# Patient Record
Sex: FEMALE | Race: WHITE | NOT HISPANIC OR LATINO | Employment: FULL TIME | URBAN - METROPOLITAN AREA
[De-identification: names, ages, dates, MRNs, and addresses within clinical notes are randomized per-mention and may not be internally consistent; named-entity substitution may affect disease eponyms.]

---

## 2017-01-31 ENCOUNTER — GENERIC CONVERSION - ENCOUNTER (OUTPATIENT)
Dept: OTHER | Facility: OTHER | Age: 62
End: 2017-01-31

## 2017-01-31 ENCOUNTER — HOSPITAL ENCOUNTER (OUTPATIENT)
Dept: RADIOLOGY | Facility: MEDICAL CENTER | Age: 62
Discharge: HOME | End: 2017-01-31
Payer: COMMERCIAL

## 2017-01-31 ENCOUNTER — ALLSCRIPTS OFFICE VISIT (OUTPATIENT)
Dept: OTHER | Facility: OTHER | Age: 62
End: 2017-01-31

## 2017-01-31 ENCOUNTER — TRANSCRIBE ORDERS (OUTPATIENT)
Dept: ADMINISTRATIVE | Facility: HOSPITAL | Age: 62
End: 2017-01-31

## 2017-01-31 DIAGNOSIS — M25.562 PAIN IN LEFT KNEE: ICD-10-CM

## 2017-01-31 PROCEDURE — 73564 X-RAY EXAM KNEE 4 OR MORE: CPT

## 2017-02-03 ENCOUNTER — TRANSCRIBE ORDERS (OUTPATIENT)
Dept: ADMINISTRATIVE | Facility: HOSPITAL | Age: 62
End: 2017-02-03

## 2017-02-03 ENCOUNTER — GENERIC CONVERSION - ENCOUNTER (OUTPATIENT)
Dept: OTHER | Facility: OTHER | Age: 62
End: 2017-02-03

## 2017-02-03 DIAGNOSIS — M25.562 LEFT KNEE PAIN, UNSPECIFIED CHRONICITY: Primary | ICD-10-CM

## 2017-02-14 ENCOUNTER — HOSPITAL ENCOUNTER (OUTPATIENT)
Dept: RADIOLOGY | Age: 62
Discharge: HOME | End: 2017-02-14
Payer: COMMERCIAL

## 2017-02-14 DIAGNOSIS — M25.562 LEFT KNEE PAIN, UNSPECIFIED CHRONICITY: ICD-10-CM

## 2017-02-14 PROCEDURE — 73721 MRI JNT OF LWR EXTRE W/O DYE: CPT

## 2017-02-20 ENCOUNTER — ALLSCRIPTS OFFICE VISIT (OUTPATIENT)
Dept: OTHER | Facility: OTHER | Age: 62
End: 2017-02-20

## 2017-03-01 ENCOUNTER — ALLSCRIPTS OFFICE VISIT (OUTPATIENT)
Dept: OTHER | Facility: OTHER | Age: 62
End: 2017-03-01

## 2017-03-23 ENCOUNTER — GENERIC CONVERSION - ENCOUNTER (OUTPATIENT)
Dept: OTHER | Facility: OTHER | Age: 62
End: 2017-03-23

## 2017-04-13 ENCOUNTER — GENERIC CONVERSION - ENCOUNTER (OUTPATIENT)
Dept: OTHER | Facility: OTHER | Age: 62
End: 2017-04-13

## 2017-04-20 ENCOUNTER — GENERIC CONVERSION - ENCOUNTER (OUTPATIENT)
Dept: OTHER | Facility: OTHER | Age: 62
End: 2017-04-20

## 2017-04-27 ENCOUNTER — GENERIC CONVERSION - ENCOUNTER (OUTPATIENT)
Dept: OTHER | Facility: OTHER | Age: 62
End: 2017-04-27

## 2017-06-16 ENCOUNTER — ALLSCRIPTS OFFICE VISIT (OUTPATIENT)
Dept: OTHER | Facility: OTHER | Age: 62
End: 2017-06-16

## 2017-06-20 DIAGNOSIS — M54.42 LOW BACK PAIN WITH LEFT-SIDED SCIATICA: ICD-10-CM

## 2017-06-23 ENCOUNTER — TRANSCRIBE ORDERS (OUTPATIENT)
Dept: ADMINISTRATIVE | Age: 62
End: 2017-06-23

## 2017-06-23 ENCOUNTER — HOSPITAL ENCOUNTER (OUTPATIENT)
Dept: RADIOLOGY | Age: 62
Discharge: HOME | End: 2017-06-23
Payer: COMMERCIAL

## 2017-06-23 DIAGNOSIS — M54.42 LOW BACK PAIN WITH LEFT-SIDED SCIATICA: ICD-10-CM

## 2017-06-23 PROCEDURE — 72110 X-RAY EXAM L-2 SPINE 4/>VWS: CPT

## 2017-06-23 PROCEDURE — 72148 MRI LUMBAR SPINE W/O DYE: CPT

## 2017-06-26 ENCOUNTER — GENERIC CONVERSION - ENCOUNTER (OUTPATIENT)
Dept: OTHER | Facility: OTHER | Age: 62
End: 2017-06-26

## 2017-08-16 ENCOUNTER — GENERIC CONVERSION - ENCOUNTER (OUTPATIENT)
Dept: OTHER | Facility: OTHER | Age: 62
End: 2017-08-16

## 2017-09-01 ENCOUNTER — GENERIC CONVERSION - ENCOUNTER (OUTPATIENT)
Dept: OTHER | Facility: OTHER | Age: 62
End: 2017-09-01

## 2017-09-18 ENCOUNTER — GENERIC CONVERSION - ENCOUNTER (OUTPATIENT)
Dept: OTHER | Facility: OTHER | Age: 62
End: 2017-09-18

## 2017-10-13 ENCOUNTER — GENERIC CONVERSION - ENCOUNTER (OUTPATIENT)
Dept: OTHER | Facility: OTHER | Age: 62
End: 2017-10-13

## 2017-12-22 ENCOUNTER — LAB CONVERSION - ENCOUNTER (OUTPATIENT)
Dept: OTHER | Facility: OTHER | Age: 62
End: 2017-12-22

## 2017-12-22 LAB
A/G RATIO (HISTORICAL): 2.1 (CALC) (ref 1–2.5)
ALBUMIN SERPL BCP-MCNC: 4.2 G/DL (ref 3.6–5.1)
ALP SERPL-CCNC: 90 U/L (ref 33–130)
AST SERPL W P-5'-P-CCNC: 16 U/L (ref 10–35)
BILIRUB SERPL-MCNC: 0.5 MG/DL (ref 0.2–1.2)
BUN SERPL-MCNC: 26 MG/DL (ref 7–25)
BUN/CREA RATIO (HISTORICAL): 37 (CALC) (ref 6–22)
CALCIUM SERPL-MCNC: 8.9 MG/DL (ref 8.6–10.4)
CHLORIDE SERPL-SCNC: 104 MMOL/L (ref 98–110)
CHOLEST SERPL-MCNC: 132 MG/DL
CHOLEST/HDLC SERPL: 2.5 (CALC)
CO2 SERPL-SCNC: 27 MMOL/L (ref 20–31)
CREAT SERPL-MCNC: 0.7 MG/DL (ref 0.5–0.99)
EGFR AFRICAN AMERICAN (HISTORICAL): 108 ML/MIN/1.73M2
EGFR-AMERICAN CALC (HISTORICAL): 94 ML/MIN/1.73M2
GAMMA GLOBULIN (HISTORICAL): 2 G/DL (CALC) (ref 1.9–3.7)
GLUCOSE (HISTORICAL): 98 MG/DL (ref 65–99)
HDLC SERPL-MCNC: 52 MG/DL
HEPATITIS A IGM ANTIBODY (HISTORICAL): NORMAL
HEPATITIS B CORE TOTAL ANTIBODY (HISTORICAL): NORMAL
HEPATITIS B SURFACE ANTIGEN (HISTORICAL): NORMAL
HEPATITIS C ANTIBODY (HISTORICAL): NORMAL
LDL CHOLESTEROL (HISTORICAL): 62 MG/DL (CALC)
NON-HDL-CHOL (CHOL-HDL) (HISTORICAL): 80 MG/DL (CALC)
POTASSIUM SERPL-SCNC: 4.4 MMOL/L (ref 3.5–5.3)
SIGNAL TO CUT-OFF (HISTORICAL): 0.02
SODIUM SERPL-SCNC: 139 MMOL/L (ref 135–146)
TOTAL PROTEIN (HISTORICAL): 6.2 G/DL (ref 6.1–8.1)
TRIGL SERPL-MCNC: 93 MG/DL
TSH SERPL DL<=0.05 MIU/L-ACNC: 1.37 MIU/L (ref 0.4–4.5)

## 2017-12-28 ENCOUNTER — GENERIC CONVERSION - ENCOUNTER (OUTPATIENT)
Dept: OTHER | Facility: OTHER | Age: 62
End: 2017-12-28

## 2017-12-28 DIAGNOSIS — E07.9 DISORDER OF THYROID: ICD-10-CM

## 2017-12-29 ENCOUNTER — HOSPITAL ENCOUNTER (OUTPATIENT)
Dept: ULTRASOUND IMAGING | Facility: HOSPITAL | Age: 62
Discharge: HOME | End: 2017-12-29
Payer: COMMERCIAL

## 2017-12-29 DIAGNOSIS — E07.9 DISORDER OF THYROID: ICD-10-CM

## 2017-12-29 PROCEDURE — 76536 US EXAM OF HEAD AND NECK: CPT

## 2018-01-09 NOTE — PROGRESS NOTES
Chief Complaint  Chief Complaint Free Text Note Form: For nurse dressing change for RLE wound  History of Present Illness  Wound Identification HPI:   Wound Identification HPI   Wound #1: right lower leg        Visit Information:   The patient came to Wound Care and was ambulatory  The patient is being seen for follow-up with RN at the [address] [place] [address]  The patient is accompanied by her self  The patient's identification was verified  A secondary verification process was completed  Orientation: oriented to person, oriented to place and oriented to time  Blood Glucose:  Not applicable  6/13/16 Patient arrives for initial visit for assessment and treatment of Right lower leg laceration  Referred by Dr Tigist Mckinney  On 6/2/16, patient fell down cellar stairs and does not remember how she fell  Blood sugar taken during visit today 91mg/mL Patient did not eat breakfast  To return to PCP for follow up on "Passing out"  Wound sutured with surrounding skin hard and indurated,Patient complaining of pain when area palpated  Continues to work as a  with legs dependent all day  Pitting edema noted of RLE  Pedal pulses palpable  6/15/2016 Patient arrived with dressing intact  RLE appears to be edematous  Patient states that she is experiencing pain in her RLE  Cassidy wound redness decreased, right toes appear to still be ecchymotic    6-17-16-Patient arrived with dressing intact  11cm of AMD was removed from wound base today  Right toes still appear ecchymotic although are resolving  Patient states that she experiences"twinging" pain at times in her RLE  Ecchymosis remains on L lateral LE  6/22/2016 Patient arrived with dressing intact  Strike through drainage present on Tubigrip F  RLE still appears edematous  6/24/2016 Patient arrived with dressing, 4x10 inch ace and Tubigrip F intact  This morning patient states that her dressing did get wet through the cast cover  Patient did finish Cephalexin  Emery Fall Scale:     History of Falling: Yes = 25   Secondary Diagnosis: Yes = 15   Ambulatory Aid None, Bedrest, Nurse Assist = 0   No = 0   Gait: Normal = 0   Mental Status: Oriented to own ability = 0   Total Score: 40    25 - 45 = Moderate Risk        Fall, Nutrition, Mobility, Neuropsychological Assessment: The most recent fall occurred denies any falls since last visit, 6/24/2016  The fall resulted from passed out  The fall was preceded by reports no warning symptoms of fall  Nutrition Assessment Screening: Food intake over the last 3 months due to the loss of appetite, digestive problems, chewing or swallowing difficulties is graded as: 2 = no decrease in food intake   Weight loss during the last 3 months: 3 = no weight loss   Mobility scored as: 2 = goes out  Psychological Stress and Acute Disease Scored as: 2 = The patient has not experienced psychological stress or acute disease in the last 3 months  Neuropsychological problems scored as: 2 = no psychological problems  Body Mass Index (BMI) scored as: 3 = BMI 23 or greater  Nutritional Assessment Screening Score: 8 - 11 points - At risk of malnutrition  Hospital Based Practices Required Assessment:   Pain Assessment   the patient states they have pain  The pain is located in the right lower extremity  The pain radiates to the denies  The patient describes the pain as Patient states that she experiences "Twinging"pain at times in her RLE  (on a scale of 0 to 10, the patient rates the pain at 4 )   Abuse And Domestic Violence Screen    Yes, the patient is safe at home  The patient states no one is hurting them  Depression And Suicide Screen  No, the patient has not had thoughts of hurting themself  No, the patient has not felt depressed in the past 7 days  Prefered Language is  [place]  Primary Language is  English  Readiness To Learn: Receptive  Barriers To Learning: none     Preferred Learning: demonstration and written Education Completed: disease/condition, medications, treatment/procedure and equipment/supplies   Teaching Method: written and demonstration   Person Taught: patient   Evaluation Of Learning: verbalized/demonstrated understanding and needs reinforcement      Active Problems    1  ACE-inhibitor cough (786 2,E942 6) (R05,T46 4X5A)   2  Acute upper respiratory infection (465 9) (J06 9)   3  Allergic conjunctivitis (372 14) (H10 10)   4  Benign essential hypertension (401 1) (I10)   5  Depression (311) (F32 9)   6  Encounter for screening colonoscopy (V76 51) (Z12 11)   7  Fall, accidental (E888 9) (W19 XXXA)   8  Fatigue (780 79) (R53 83)   9  Hematoma of lower extremity, right, sequela (906 3) (S80 11XS)   10  Influenza-like symptoms (780 99) (R68 89)   11  Irritable bowel syndrome (564 1) (K58 9)   12  Laceration of right lower extremity, initial encounter (894 0) (S81 811A)   13  Laceration of right lower leg with complication, sequela (757 4) (S81 811S)   14  Lipoma (214 9) (D17 9)   15  Lower back pain (724 2) (M54 5)   16  Menopausal symptoms (627 2) (N95 1)   17  Neuropathy (355 9) (G62 9)   18  Non-healing surgical wound, initial encounter (998 83) (T81 89XA)   19  Nontoxic single thyroid nodule (241 0) (E04 1)   20  OCD (obsessive compulsive disorder) (300 3) (F42)   21  Open wound of right lower extremity (891 0) (S81 801A)   22  Sinus infection (473 9) (J32 9)   23  Sore throat (462) (J02 9)   24  Thyroid disorder (246 9) (E07 9)   25  Traumatic hematoma of right lower leg (924 10) (S80 11XA)   26  Upper respiratory infection (465 9) (J06 9)   27  Vitamin B12 deficiency (266 2) (E53 8)   28  Vitamin D deficiency (268 9) (E55 9)    Past Medical History    1  History of Abnormal weight gain (783 1) (R63 5)   2  History of Cough (786 2) (R05)   3  History of Dysfunction of left Eustachian tube (381 81) (H69 82)   4  History of Dysfunction of right Eustachian tube (381 81) (H69 81)    Surgical History    1  History of  Section   2  History of Complete Colonoscopy   3  History of Gallbladder Surgery   4  History of Tonsillectomy With Adenoidectomy   5  History of Tubal Ligation   6  History of Vaginal Hysterectomy    Family History    1  Family history of aortic stenosis (V17 49) (Z82 49)   2  Family history of heart murmur (V17 49) (Z84 89)   3  Family history of Hypertension, benign    4  Family history of Benign Essential Hypertension   5  Family history of Coronary Artery Stenosis    Social History    · Being A Social Drinker   · Former smoker (B37 41) (Y37 413)   · Single    Current Meds   1  Losartan Potassium-HCTZ 50-12 5 MG Oral Tablet; TAKE 1 TABLET DAILY; Therapy: 00ZLX7705 to (Evaluate:2017)  Requested for: 76SGS5385; Last   Rx:2016 Ordered   2  Multivitamins TABS; Therapy: (Recorded:2013) to Recorded   3  Venlafaxine HCl  MG Oral Tablet Extended Release 24 Hour; Take 1 tablet daily; Therapy: 45JNW1786 to (Evaluate:2017)  Requested for: 62IAZ1686; Last   Rx:2016 Ordered   4  Vitamin B-12 1000 MCG Oral Tablet; TAKE 1 TABLET DAILY AS DIRECTED; Therapy: 97EFK6539 to (Evaluate:2016) Recorded   5  Vitamin D-3 5000 UNIT TABS; Therapy: (Recorded:2013) to Recorded    Allergies    1  ACE Inhibitors    Vitals  Signs [Data Includes: Current Encounter]   Recorded: 53VVM3426 02:06PM   Temperature: 97 7 F, Tympanic  Heart Rate: 80, L Radial  Pulse Quality: Normal, L Radial  Respiration: 18  Respiration Quality: Normal  Systolic: 766, LUE, Sitting  Diastolic: 68, LUE, Sitting  Height: 5 ft 3 in  Weight: 195 lb   BMI Calculated: 34 54  BSA Calculated: 1 91  Pain Scale: 4    Physical Exam    Wound #1 Assessment wound #1 Location:, right lower leg, started on 16, Care for this wound started on 16  Wound Status: not healed     Length: 1 4cm x Width: 5cm x Depth: 1 2cm   Total: 7sq cm   Wound Volume: 8 4cm3       Undermining 2 2cm from 12 o'clock to 6 o'clock Tissue type: Subcutaneous, Granulation, Slough and unable to visualize wound bed   Color of Wound: Red - 90% and Yellow - 10%   Exudate Amount: Large   Exudate Type: Serosangiunous   Odor: None   Exudate Color: Yellow and bloody,brown   Wound Edges: Intact   Periwound Skin Condition: Intact, Erythematous, Edema        Procedure      Wound #1: right lower extremity     Nurse Dressing Change:   Wound #wound one The wound located on the RLE  Wound care rendered as per Physician/Advanced Practitioner order/plan  Return to [address] [place] [address] F/u on 6/27/2016  Comments:      Wound Drsg  Orders/Instructions  Wound Identification Dressing Orders--Instructions:   Wound Identification and Instructions   Wound #1: right lower leg    Wound Care Instructions  Discussed with Patient/Caregiver  Dressing Type: AMD  Wash with mild soap and water, normal saline, wound cleanser  As specified, use: Irrigate with NSS  Apply specified dressing to wound base/bed  To periwound apply: Calmoseptine  If using packing in a tunnel, secure tail to skin with tape   Secondary dressing apply: Gauze, 4 x 4  ABD  Secure with: Kerlix, tape, 4x10 inch ace  Dressing change frequency: Three times per week, at [facility]  Comments/Other: Ace wrap (4" x 10') base of toes to knee followed by Tubigrip F  Apply compression using: Tubigrip F  Wound Goals  Wound Goals:   Healing Goals:   Good healing potential    Wound base will be 25%    Patient will achieve full wound closure and return to full ADLs       Impression  Wound [address]:    Wound Nursing Care Plan   Impaired Tissue Integrity related to:   Knowledge Deficit Related To:   Risk for Infection related to open wound:   Pain related to disease process and/or wound treatment:   Imbalanced Nutrition, less than body requirements related to inadequate intake and/or disease process: inadequate protein intake   Risk for Fall related to criteria noted on Fall Risk Assessment:   Goals   Patient will achieve 100% epithelialization:  Patient will demonstrate and verbalize knowledge of their disease process and management:  Patient will verbalize signs and symptoms of infection and when to notify physician or [facility]:    Patient will demonstrate and verbalize infection control and preventative measures:  Patient will report adequate pain management:  Patient will verbalize knowledge of and demonstrate adherence to interventions to achieve optimal nutrition required for wound healing:  Patient will not experience a fall:  Patient will maintain or achieve adequate hydration: and nutrition  Wound Nursing Care Interventions:   Provide moist wound healing:  Evaluate current medication regime for medications which may slow/impede wound healing:  Teach patient and/or family about disease process and management methods:  Teach patient and/or family about infection control measures (gloving, hand sanitation, MDROs, disposal of soiled dressings, antibiotic therapy): [place] Assess pain and patients pain tolerance level:  Evaluate effectiveness of pain management interventions:  Assess patient's nutrition on each wound care visit (including protein and fluid intake): [place] Educate on diet and hydration required to enhance wound healing:  Teach patient and caregiver how to change wound dressing:  Complete Fall Risk Assessment upon admission to wound program and with change in condition/implement identified interventions:    Elevate legs above heart 30 minutes 3 times a day, walk 30 minutes daily, reduced sodium diet, diuretics as ordered, wear compression hose or wrap as ordered:     Other:  Care plan initiated 6/13/16      Future Appointments    Date/Time Provider Specialty Site   06/27/2016 01:15 PM Sejal Morejon MD General Surgery [facility]   07/05/2016 10:00 AM Nnamdi Mata DO [facility]   08/33/9722 46:78 PM Faye [facility]     Signatures   Electronically signed by : Tanna Bella RN; Jun 24 2016  3:25PM EST                       (Author)    Electronically signed by : Tanna Bella RN; Jun 24 2016  3:33PM EST                       (Author)

## 2018-01-11 ENCOUNTER — ALLSCRIPTS OFFICE VISIT (OUTPATIENT)
Dept: OTHER | Facility: OTHER | Age: 63
End: 2018-01-11

## 2018-01-11 LAB
BILIRUB UR QL STRIP: NORMAL
CLARITY UR: NORMAL
COLOR UR: NORMAL
GLUCOSE (HISTORICAL): NORMAL
HGB UR QL STRIP.AUTO: NORMAL
KETONES UR STRIP-MCNC: NORMAL MG/DL
LEUKOCYTE ESTERASE UR QL STRIP: NORMAL
NITRITE UR QL STRIP: NORMAL
PH UR STRIP.AUTO: 5 [PH]
PROT UR STRIP-MCNC: NORMAL MG/DL
SP GR UR STRIP.AUTO: 1.02
UROBILINOGEN UR QL STRIP.AUTO: 0.2

## 2018-01-11 PROCEDURE — 87086 URINE CULTURE/COLONY COUNT: CPT | Performed by: FAMILY MEDICINE

## 2018-01-11 NOTE — PROGRESS NOTES
Chief Complaint  Chief Complaint Free Text Note Form: Nurse dressing change for RLE, multi wound  History of Present Illness  Wound Identification HPI:   Wound Identification HPI   Wound #1: right lower leg (multi)        Visit Information:   The patient came to Wound Care and was ambulatory  The patient is being seen for a follow-up with MD at the [address] [place] [address]  The patient is accompanied by her self  The patient's identification was verified  A secondary verification process was completed  Orientation: oriented to person, oriented to place and oriented to time  Blood Glucose:  Not applicable  6/13/16 Patient arrives for initial visit for assessment and treatment of Right lower leg laceration  Referred by Dr Tigist Mckinney  On 6/2/16, patient fell down cellar stairs and does not remember how she fell  Blood sugar taken during visit today 91mg/mL Patient did not eat breakfast  To return to PCP for follow up on "Passing out"  Wound sutured with surrounding skin hard and indurated,Patient complaining of pain when area palpated  Continues to work as a  with legs dependent all day  Pitting edema noted of RLE  Pedal pulses palpable  6/15/2016 Patient arrived with dressing intact  RLE appears to be edematous  Patient states that she is experiencing pain in her RLE  Cassidy wound redness decreased, right toes appear to still be ecchymotic    6-17-16-Patient arrived with dressing intact  11cm of AMD was removed from wound base today  Right toes still appear ecchymotic although are resolving  Patient states that she experiences"twinging" pain at times in her RLE  Ecchymosis remains on L lateral LE  6/22/2016 Patient arrived with dressing intact  Strike through drainage present on Tubigrip F  RLE still appears edematous  6/24/2016 Patient arrived with dressing, 4x10 inch ace and Tubigrip F intact  This morning patient states that her dressing did get wet through the cast cover   Patient did finish Cephalexin  6/29/16 Patient arrives with compression and dressings intact on RLE  optifoam has placed pressure on the distal wound edges and there is less edema  7/1/16 arrived with dressing, ace wrap and tubigrip intact to RLE  Offers no complaints and reports she is "better" today  She reports she has been taking Amoxicillin for a dental problem since Tuesday 6/29 7-5-16 arrived today with dressing contact to RLE  Reports dressing "got wet" this AM in the shower so she took the kerlix off  Remaining dressing dry upon arrival  Skin bridge is present in wound converting wound to multi (2) wound)  Reports is seeing PCP today after this visit regarding falls  7/8/2016 Patient arrived with dressing intact  Patient had an CT-Scan of her head on 7/6/2016 and chest x-ray to help r/o while patient fell 5 weeks ago  7/11/2016 Patient arrived with dressing intact  Patient states that she prefers to wear the 4x10 inch ace  Patient reports that her RLE feels better with the ace applied and it also helps keep the dressing intact  7/13/2016: Arrived with dressing intact  Lower leg edema improving  7/15/16: Arrived with dressing intact  Wound smaller and tunnel closed down  Emery Fall Scale:     History of Falling: Yes = 25   Secondary Diagnosis: Yes = 15   Ambulatory Aid None, Bedrest, Nurse Assist = 0   No = 0   Gait: Normal = 0   Mental Status: Oriented to own ability = 0   Total Score: 40    25 - 45 = Moderate Risk        Fall, Nutrition, Mobility, Neuropsychological Assessment: The most recent fall occurred denies any falls since last visit, 7/11/2016  The fall resulted from passed out  The fall was preceded by reports no warning symptoms of fall     Nutrition Assessment Screening: Food intake over the last 3 months due to the loss of appetite, digestive problems, chewing or swallowing difficulties is graded as: 2 = no decrease in food intake   Weight loss during the last 3 months: 3 = no weight loss   Mobility scored as: 2 = goes out  Psychological Stress and Acute Disease Scored as: 2 = The patient has not experienced psychological stress or acute disease in the last 3 months  Neuropsychological problems scored as: 2 = no psychological problems  Body Mass Index (BMI) scored as: 3 = BMI 23 or greater  Nutritional Assessment Screening Score: 8 - 11 points - At risk of malnutrition  Hospital Based Practices Required Assessment:   Pain Assessment   the patient states they do not have pain  The pain is located in the denies  The pain radiates to the denies  The patient describes the pain as  (on a scale of 0 to 10, the patient rates the pain at 0 )   Abuse And Domestic Violence Screen    Yes, the patient is safe at home  The patient states no one is hurting them  Depression And Suicide Screen  No, the patient has not had thoughts of hurting themself  No, the patient has not felt depressed in the past 7 days  Prefered Language is  [place]  Primary Language is  English  Readiness To Learn: Receptive  Barriers To Learning: none  Preferred Learning: demonstration and written   Education Completed: disease/condition, medications, treatment/procedure and equipment/supplies   Teaching Method: written and demonstration   Person Taught: patient   Evaluation Of Learning: verbalized/demonstrated understanding and needs reinforcement      Active Problems    1  ACE-inhibitor cough (786 2,E942 6) (R05,T46 4X5A)   2  Acute upper respiratory infection (465 9) (J06 9)   3  Allergic conjunctivitis (372 14) (H10 10)   4  Benign essential hypertension (401 1) (I10)   5  Change in mental status (780 97) (R41 82)   6  Chest pain (786 50) (R07 9)   7  Depression (311) (F32 9)   8  Encounter for screening colonoscopy (V76 51) (Z12 11)   9  Fall, accidental (E888 9) (W19 XXXA)   10  Fatigue (780 79) (R53 83)   11  Hematoma of lower extremity, right, sequela (906 3) (S80 11XS)   12   Influenza-like symptoms (780 99) (R68 89) 13  Irritable bowel syndrome (564 1) (K58 9)   14  Laceration of right lower extremity, initial encounter (894 0) (S81 811A)   15  Laceration of right lower leg with complication, sequela (513 1) (S81 811S)   16  Lipoma (214 9) (D17 9)   17  Lower back pain (724 2) (M54 5)   18  Menopausal symptoms (627 2) (N95 1)   19  Neuropathy (355 9) (G62 9)   20  Non-healing surgical wound, initial encounter (998 83) (T81 89XA)   21  Nontoxic single thyroid nodule (241 0) (E04 1)   22  Numbness and tingling of right arm (782 0) (R20 0,R20 2)   23  OCD (obsessive compulsive disorder) (300 3) (F42)   24  Open wound of right lower extremity (891 0) (S81 801A)   25  Sinus infection (473 9) (J32 9)   26  Sore throat (462) (J02 9)   27  Syncope (780 2) (R55)   28  Thyroid disorder (246 9) (E07 9)   29  Traumatic hematoma of right lower leg (924 10) (S80 11XA)   30  Upper respiratory infection (465 9) (J06 9)   31  Vitamin B12 deficiency (266 2) (E53 8)   32  Vitamin D deficiency (268 9) (E55 9)    Past Medical History    1  History of Abnormal weight gain (783 1) (R63 5)   2  History of Cough (786 2) (R05)   3  History of Dysfunction of left Eustachian tube (381 81) (H69 82)   4  History of Dysfunction of right Eustachian tube (381 81) (H69 81)    Surgical History    1  History of  Section   2  History of Complete Colonoscopy   3  History of Gallbladder Surgery   4  History of Tonsillectomy With Adenoidectomy   5  History of Tubal Ligation   6  History of Vaginal Hysterectomy    Family History    1  Family history of aortic stenosis (V17 49) (Z82 49)   2  Family history of heart murmur (V17 49) (Z84 89)   3  Family history of Hypertension, benign    4  Family history of Benign Essential Hypertension   5  Family history of Coronary Artery Stenosis    Social History    · Being A Social Drinker   · Former smoker (P64 96) (H39 234)   · Single    Current Meds   1   Losartan Potassium-HCTZ 50-12 5 MG Oral Tablet; TAKE 1 TABLET DAILY; Therapy: 89SZY7969 to (Evaluate:24Feb2017)  Requested for: 43RAG0211; Last   Rx:01Mar2016 Ordered   2  Multivitamins TABS; Therapy: (Recorded:22Feb2013) to Recorded   3  Venlafaxine HCl  MG Oral Tablet Extended Release 24 Hour; Take 1 tablet daily; Therapy: 84PBW2253 to (Evaluate:24Feb2017)  Requested for: 50XPQ4574; Last   Rx:01Mar2016 Ordered   4  Vitamin B-12 1000 MCG Oral Tablet; TAKE 1 TABLET DAILY AS DIRECTED; Therapy: 59QXJ6983 to (Evaluate:23Jan2016) Recorded   5  Vitamin D-3 5000 UNIT TABS; Therapy: (Recorded:22Feb2013) to Recorded    Allergies    1  ACE Inhibitors    Vitals  Signs [Data Includes: Current Encounter]   Recorded: 54Jtl5135 02:10PM   Temperature: 96 5 F, Tympanic  Heart Rate: 60  Respiration: 18  Systolic: 486  Diastolic: 80  Height: 5 ft 3 in  Weight: 195 lb   BMI Calculated: 34 54  BSA Calculated: 1 91  Pain Scale: 0    Physical Exam    Wound #1 Assessment wound #1 Location:, right lower leg (multi), started on 6/2/16, Care for this wound started on 6/13/16  Wound Status: not healed  Length: 0 2cm x Width: 1cm x Depth: 0 3cm   Total: 0 2sq cm   Wound Volume: 0 06cm3   Tunneling 0 8cm at 3 o'clock          Tissue type: Subcutaneous, Granulation, Hypergranulation, Slough and Hypergranular on lateral edge   Color of Wound: Red - 90% and Yellow - 10%   Exudate Amount: Scant   Exudate Type: Serosangiunous   Odor: None   Exudate Color: brown   Wound Edges: Intact and Rolled (epibolized)   Periwound Skin Condition: Intact, lower leg edema           Procedure      Wound #1: right lower extremity multi wound  Nurse Dressing Change:   Wound #1 The wound located on the right lower extremity multi wound  Wound care rendered as per Physician/Advanced Practitioner order/plan  Return to [address] [place] [address] Monday for RN dressing change  Friday with Dr Conley Human  Comments:      Wound Drsg   Orders/Instructions  Wound Identification Dressing Orders--Instructions:   Wound Identification and Instructions   Wound #1: right lower extremity multi wound  Wound Care Instructions  Discussed with Patient/Caregiver  Dressing Type: Acticoat 3  Wash with mild soap and water, normal saline, wound cleanser  As specified, use: NSS  Apply specified dressing to wound base/bed  To periwound apply: Calmoseptine  Secondary dressing apply: Gauze, 4x4,5x9  Secure with: Kerlix, tape  Dressing change frequency: Three times per week, at [facility]  Comments/Other:   Acticoat 3 in tunnel and Puracol on superficial areas of wound, 4x10 inch ace and Tubigrip F  Apply compression using: Tubigrip F  Wound Goals  Wound Goals:   Healing Goals:   Good healing potential    Wound base will be 25%    Patient will achieve full wound closure and return to full ADLs       Impression  Wound [address]: Wound Nursing Care Plan   Impaired Tissue Integrity related to:   Knowledge Deficit Related To:   Risk for Infection related to open wound:   Pain related to disease process and/or wound treatment:   Imbalanced Nutrition, less than body requirements related to inadequate intake and/or disease process: inadequate protein intake   Risk for Fall related to criteria noted on Fall Risk Assessment:   Goals   Patient will achieve 100% epithelialization:  Patient will demonstrate and verbalize knowledge of their disease process and management:  Patient will verbalize signs and symptoms of infection and when to notify physician or [facility]:    Patient will demonstrate and verbalize infection control and preventative measures:  Patient will report adequate pain management:  Patient will verbalize knowledge of and demonstrate adherence to interventions to achieve optimal nutrition required for wound healing:  Patient will not experience a fall:  Patient will maintain or achieve adequate hydration: and nutrition  Wound Nursing Care Interventions:   Provide moist wound healing:     Evaluate current medication regime for medications which may slow/impede wound healing:  Teach patient and/or family about disease process and management methods:  Teach patient and/or family about infection control measures (gloving, hand sanitation, MDROs, disposal of soiled dressings, antibiotic therapy): Ester Montez Assess pain and patients pain tolerance level:  Evaluate effectiveness of pain management interventions:  Assess patient's nutrition on each wound care visit (including protein and fluid intake): Ester Montez Educate on diet and hydration required to enhance wound healing:  Teach patient and caregiver how to change wound dressing:  Complete Fall Risk Assessment upon admission to wound program and with change in condition/implement identified interventions:    Elevate legs above heart 30 minutes 3 times a day, walk 30 minutes daily, reduced sodium diet, diuretics as ordered, wear compression hose or wrap as ordered:  Other:  Care plan initiated 6/13/16      Future Appointments    Date/Time Provider Specialty Site   07/22/2016 08:30 AM Keanu Shelley MD General Surgery [facility]   07/26/2016 11:00 AM MARIANA Vela   Cardiology  CARDIOLOGY  [place]   32/47/1865 78:80 PM Yazmin Cavazos DO Family Medicine [facility]     Signatures   Electronically signed by : Tian Francisco RN; Jul 15 2016  2:13PM EST                       (Author)

## 2018-01-11 NOTE — PROGRESS NOTES
Chief Complaint  Chief Complaint Free Text Note Form: For nurse dressing change for RLE wound  History of Present Illness  Wound Identification HPI:   Wound Identification HPI   Wound #1: right lower leg (multi)        Visit Information:   The patient came to Wound Care and was ambulatory  The patient is being seen for follow-up with RN at the [address] [place] [address]  The patient's identification was verified  A secondary verification process was completed  Orientation: oriented to person, oriented to place and oriented to time  Blood Glucose:  Not applicable  6/13/16 Patient arrives for initial visit for assessment and treatment of Right lower leg laceration  Referred by Dr Tigist Mckinney  On 6/2/16, patient fell down cellar stairs and does not remember how she fell  Blood sugar taken during visit today 91mg/mL Patient did not eat breakfast  To return to PCP for follow up on "Passing out"  Wound sutured with surrounding skin hard and indurated,Patient complaining of pain when area palpated  Continues to work as a  with legs dependent all day  Pitting edema noted of RLE  Pedal pulses palpable  6/15/2016 Patient arrived with dressing intact  RLE appears to be edematous  Patient states that she is experiencing pain in her RLE  Cassidy wound redness decreased, right toes appear to still be ecchymotic    6-17-16-Patient arrived with dressing intact  11cm of AMD was removed from wound base today  Right toes still appear ecchymotic although are resolving  Patient states that she experiences"twinging" pain at times in her RLE  Ecchymosis remains on L lateral LE  6/22/2016 Patient arrived with dressing intact  Strike through drainage present on Tubigrip F  RLE still appears edematous  6/24/2016 Patient arrived with dressing, 4x10 inch ace and Tubigrip F intact  This morning patient states that her dressing did get wet through the cast cover  Patient did finish Cephalexin  6/29/16 Patient arrives with compression and dressings intact on RLE  optifoam has placed pressure on the distal wound edges and there is less edema  7/1/16 arrived with dressing, ace wrap and tubigrip intact to RLE  Offers no complaints and reports she is "better" today  She reports she has been taking Amoxicillin for a dental problem since Tuesday 6/29 7-5-16 arrived today with dressing contact to RLE  Reports dressing "got wet" this AM in the shower so she took the kerlix off  Remaining dressing dry upon arrival  Skin bridge is present in wound converting wound to multi (2) wound)  Reports is seeing PCP today after this visit regarding falls  Emery Fall Scale:     History of Falling: Yes = 25   Secondary Diagnosis: Yes = 15   Ambulatory Aid None, Bedrest, Nurse Assist = 0   No = 0   Gait: Normal = 0   Mental Status: Oriented to own ability = 0   Total Score: 40    25 - 45 = Moderate Risk        Fall, Nutrition, Mobility, Neuropsychological Assessment: The most recent fall occurred denies any falls since last visit, 7/5/2016  The fall resulted from passed out  The fall was preceded by reports no warning symptoms of fall  Nutrition Assessment Screening: Food intake over the last 3 months due to the loss of appetite, digestive problems, chewing or swallowing difficulties is graded as: 2 = no decrease in food intake   Weight loss during the last 3 months: 3 = no weight loss   Mobility scored as: 2 = goes out  Psychological Stress and Acute Disease Scored as: 2 = The patient has not experienced psychological stress or acute disease in the last 3 months  Neuropsychological problems scored as: 2 = no psychological problems  Body Mass Index (BMI) scored as: 3 = BMI 23 or greater  Nutritional Assessment Screening Score: 8 - 11 points - At risk of malnutrition  Hospital Based Practices Required Assessment:   Pain Assessment   the patient states they have pain  The pain is located in the right lower extremity   The patient describes the pain as Patient states that she experiences "Twinging"pain at times in her RLE  (on a scale of 0 to 10, the patient rates the pain at 0 )   Abuse And Domestic Violence Screen    Yes, the patient is safe at home  The patient states no one is hurting them  Depression And Suicide Screen  No, the patient has not had thoughts of hurting themself  No, the patient has not felt depressed in the past 7 days  Prefered Language is  [place]  Primary Language is  English  Readiness To Learn: Receptive  Barriers To Learning: none  Preferred Learning: demonstration and written   Education Completed: disease/condition, medications, treatment/procedure and equipment/supplies   Teaching Method: written and demonstration   Person Taught: patient   Evaluation Of Learning: verbalized/demonstrated understanding and needs reinforcement      Active Problems    1  ACE-inhibitor cough (786 2,E942 6) (R05,T46 4X5A)   2  Acute upper respiratory infection (465 9) (J06 9)   3  Allergic conjunctivitis (372 14) (H10 10)   4  Benign essential hypertension (401 1) (I10)   5  Depression (311) (F32 9)   6  Encounter for screening colonoscopy (V76 51) (Z12 11)   7  Fall, accidental (E888 9) (W19 XXXA)   8  Fatigue (780 79) (R53 83)   9  Hematoma of lower extremity, right, sequela (906 3) (S80 11XS)   10  Influenza-like symptoms (780 99) (R68 89)   11  Irritable bowel syndrome (564 1) (K58 9)   12  Laceration of right lower extremity, initial encounter (894 0) (S81 811A)   13  Laceration of right lower leg with complication, sequela (949 3) (S81 811S)   14  Lipoma (214 9) (D17 9)   15  Lower back pain (724 2) (M54 5)   16  Menopausal symptoms (627 2) (N95 1)   17  Neuropathy (355 9) (G62 9)   18  Non-healing surgical wound, initial encounter (998 83) (T81 89XA)   19  Nontoxic single thyroid nodule (241 0) (E04 1)   20  OCD (obsessive compulsive disorder) (300 3) (F42)   21   Open wound of right lower extremity (891 0) (S81 801A)   22  Sinus infection (473 9) (J32 9)   23  Sore throat (462) (J02 9)   24  Thyroid disorder (246 9) (E07 9)   25  Traumatic hematoma of right lower leg (924 10) (S80 11XA)   26  Upper respiratory infection (465 9) (J06 9)   27  Vitamin B12 deficiency (266 2) (E53 8)   28  Vitamin D deficiency (268 9) (E55 9)    Past Medical History    1  History of Abnormal weight gain (783 1) (R63 5)   2  History of Cough (786 2) (R05)   3  History of Dysfunction of left Eustachian tube (381 81) (H69 82)   4  History of Dysfunction of right Eustachian tube (381 81) (H69 81)    Surgical History    1  History of  Section   2  History of Complete Colonoscopy   3  History of Gallbladder Surgery   4  History of Tonsillectomy With Adenoidectomy   5  History of Tubal Ligation   6  History of Vaginal Hysterectomy    Family History    1  Family history of aortic stenosis (V17 49) (Z82 49)   2  Family history of heart murmur (V17 49) (Z84 89)   3  Family history of Hypertension, benign    4  Family history of Benign Essential Hypertension   5  Family history of Coronary Artery Stenosis    Social History    · Being A Social Drinker   · Former smoker (M86 45) (F03 508)   · Single    Current Meds   1  Lidocaine HCl - 4 % External Solution; apply to wound/s prior to debridement at the [facility]   for pain; Therapy: (Recorded:2016) to Recorded   2  Losartan Potassium-HCTZ 50-12 5 MG Oral Tablet; TAKE 1 TABLET DAILY; Therapy: 27TMQ3398 to (Evaluate:36Bay8598)  Requested for: 57BNP3678; Last   Rx:2016 Ordered   3  Multivitamins TABS; Therapy: (Recorded:17Act1736) to Recorded   4  Venlafaxine HCl  MG Oral Tablet Extended Release 24 Hour; Take 1 tablet daily; Therapy: 31RWZ5020 to (Evaluate:2017)  Requested for: 38NLO4714; Last   Rx:2016 Ordered   5  Vitamin B-12 1000 MCG Oral Tablet; TAKE 1 TABLET DAILY AS DIRECTED; Therapy: 66LZI9016 to (Evaluate:2016) Recorded   6   Vitamin D-3 5000 UNIT TABS;   Therapy: (Recorded:39Gqd5189) to Recorded    Allergies    1  ACE Inhibitors    Vitals  Signs [Data Includes: Current Encounter]   Recorded: 97DQG7669 09:09AM   Temperature: 97 8 F, Tympanic  Heart Rate: 88  Respiration: 18  Systolic: 161  Diastolic: 70  Height: 5 ft 3 in  Weight: 195 lb   BMI Calculated: 34 54  BSA Calculated: 1 91  Pain Scale: 0    Physical Exam    Wound #1 Assessment wound #1 Location:, right lower leg (multi), started on 6/2/16, Care for this wound started on 6/13/16  Wound Status: not healed  Length: 0 7cm x Width: 3cm x Depth: 0 6cm   Total: 2 1sq cm   Wound Volume: 1 26cm3   Tunneling 0 7cm at 3 o'clock          Tissue type: Subcutaneous, Granulation and Slough   Color of Wound: Red - 80% and Yellow - 20%   Exudate Amount: Minimal   Exudate Type: Serosangiunous   Odor: None   Exudate Color: bloody   Wound Edges: Intact and Epidermal Migration   Periwound Skin Condition: Intact, Edema   Comments: now 2 wounds- skin bridge is present in center of wound  Procedure      Wound #1: RLE (multi)     Nurse Dressing Change:   Wound #1 The wound located on the RLE   Wound care rendered as per Physician/Advanced Practitioner order/plan  Return to [address] [place] [address] Tuesday 7/8 for nurse dressing change  Comments:      Wound Drsg  Orders/Instructions  Wound Identification Dressing Orders--Instructions:   Wound Identification and Instructions   Wound #1: RLE (multi)    Wound Care Instructions  Discussed with Patient/Caregiver  Dressing Type: Acticoat 3  Wash with mild soap and water, normal saline, wound cleanser  As specified, use: DO NOT GET WET  Apply specified dressing to wound base/bed  To periwound apply: Calmoseptine  Secondary dressing apply: Gauze, Optifoam, ABD  Secure with: Kerlix, tape  Dressing change frequency: Three times per week, at [facility]  Comments/Other:   Drawtex over acticoat 3  Puracol on superficial lateral areas  Next dressing change on 7/8  Apply compression usin" Ace Wrap and Tubigrip F  Wound Goals  Wound Goals:   Healing Goals:   Good healing potential    Wound base will be 25%    Patient will achieve full wound closure and return to full ADLs       Impression  Wound [address]: Wound Nursing Care Plan   Impaired Tissue Integrity related to:   Knowledge Deficit Related To:   Risk for Infection related to open wound:   Pain related to disease process and/or wound treatment:   Imbalanced Nutrition, less than body requirements related to inadequate intake and/or disease process: inadequate protein intake   Risk for Fall related to criteria noted on Fall Risk Assessment:   Goals   Patient will achieve 100% epithelialization:  Patient will demonstrate and verbalize knowledge of their disease process and management:  Patient will verbalize signs and symptoms of infection and when to notify physician or [facility]:    Patient will demonstrate and verbalize infection control and preventative measures:  Patient will report adequate pain management:  Patient will verbalize knowledge of and demonstrate adherence to interventions to achieve optimal nutrition required for wound healing:  Patient will not experience a fall:  Patient will maintain or achieve adequate hydration: and nutrition  Wound Nursing Care Interventions:   Provide moist wound healing:  Evaluate current medication regime for medications which may slow/impede wound healing:  Teach patient and/or family about disease process and management methods:  Teach patient and/or family about infection control measures (gloving, hand sanitation, MDROs, disposal of soiled dressings, antibiotic therapy): [place] Assess pain and patients pain tolerance level:  Evaluate effectiveness of pain management interventions:  Assess patient's nutrition on each wound care visit (including protein and fluid intake): [place]    Educate on diet and hydration required to enhance wound healing:  Teach patient and caregiver how to change wound dressing:  Complete Fall Risk Assessment upon admission to wound program and with change in condition/implement identified interventions:    Elevate legs above heart 30 minutes 3 times a day, walk 30 minutes daily, reduced sodium diet, diuretics as ordered, wear compression hose or wrap as ordered:     Other:  Care plan initiated 6/13/16      Future Appointments    Date/Time Provider Specialty Site   07/08/2016 08:30 AM Alis Sepulveda MD General Surgery [facility]   07/05/2016 10:00 AM Onelia Ahumada[address] 15:00 PM [facility]     Signatures   Electronically signed by : Shamar Linares RN; Jul 5 2016  9:09AM EST                       (Author)

## 2018-01-12 ENCOUNTER — LAB REQUISITION (OUTPATIENT)
Dept: LAB | Facility: HOSPITAL | Age: 63
End: 2018-01-12
Payer: COMMERCIAL

## 2018-01-12 VITALS — BODY MASS INDEX: 34.59 KG/M2 | WEIGHT: 195.25 LBS | HEIGHT: 63 IN

## 2018-01-12 DIAGNOSIS — M54.42 LOW BACK PAIN WITH LEFT-SIDED SCIATICA: ICD-10-CM

## 2018-01-12 NOTE — PROGRESS NOTES
Chief Complaint  Chief Complaint Free Text Note Form: For nurse dressing change for RLE wound  History of Present Illness  Wound Identification HPI:   Wound Identification HPI   Wound #1: right lower leg        Visit Information:   The patient came to Wound Care and was ambulatory  The patient is being seen for follow-up with RN at the [address] [place] [address]  The patient is accompanied by her self  The patient's identification was verified  A secondary verification process was completed  Orientation: oriented to person, oriented to place and oriented to time  Blood Glucose:  Not applicable  6/13/16 Patient arrives for initial visit for assessment and treatment of Right lower leg laceration  Referred by Dr Tigist Mckinney  On 6/2/16, patient fell down cellar stairs and does not remember how she fell  Blood sugar taken during visit today 91mg/mL Patient did not eat breakfast  To return to PCP for follow up on "Passing out"  Wound sutured with surrounding skin hard and indurated,Patient complaining of pain when area palpated  Continues to work as a  with legs dependent all day  Pitting edema noted of RLE  Pedal pulses palpable  6/15/2016 Patient arrived with dressing intact  RLE appears to be edematous  Patient states that she is experiencing pain in her RLE  Cassidy wound redness decreased, right toes appear to still be ecchymotic    6-17-16-Patient arrived with dressing intact  11cm of AMD was removed from wound base today  Right toes still appear ecchymotic although are resolving  Patient states that she experiences"twinging" pain at times in her RLE  Ecchymosis remains on L lateral LE  6/22/2016 Patient arrived with dressing intact  Strike through drainage present on Tubigrip F  RLE still appears edematous  6/24/2016 Patient arrived with dressing, 4x10 inch ace and Tubigrip F intact  This morning patient states that her dressing did get wet through the cast cover  Patient did finish Cephalexin  6/29/16 Patient arrives with compression and dressings intact on RLE  optifoam has placed pressure on the distal wound edges and there is less edema  Emery Fall Scale:     History of Falling: Yes = 25   Secondary Diagnosis: Yes = 15   Ambulatory Aid None, Bedrest, Nurse Assist = 0   No = 0   Gait: Normal = 0   Mental Status: Oriented to own ability = 0   Total Score: 40    25 - 45 = Moderate Risk        Fall, Nutrition, Mobility, Neuropsychological Assessment: The most recent fall occurred denies any falls since last visit, 6/24/2016  The fall resulted from passed out  The fall was preceded by reports no warning symptoms of fall  Nutrition Assessment Screening: Food intake over the last 3 months due to the loss of appetite, digestive problems, chewing or swallowing difficulties is graded as: 2 = no decrease in food intake   Weight loss during the last 3 months: 3 = no weight loss   Mobility scored as: 2 = goes out  Psychological Stress and Acute Disease Scored as: 2 = The patient has not experienced psychological stress or acute disease in the last 3 months  Neuropsychological problems scored as: 2 = no psychological problems  Body Mass Index (BMI) scored as: 3 = BMI 23 or greater  Nutritional Assessment Screening Score: 8 - 11 points - At risk of malnutrition  Hospital Based Practices Required Assessment:   Pain Assessment   the patient states they have pain  The pain is located in the right lower extremity  The pain radiates to the denies  The patient describes the pain as Patient states that she experiences "Twinging"pain at times in her RLE  (on a scale of 0 to 10, the patient rates the pain at 4 )   Abuse And Domestic Violence Screen    Yes, the patient is safe at home  The patient states no one is hurting them  Depression And Suicide Screen  No, the patient has not had thoughts of hurting themself  No, the patient has not felt depressed in the past 7 days  Prefered Language is  [place]  Primary Language is  English  Readiness To Learn: Receptive  Barriers To Learning: none  Preferred Learning: demonstration and written   Education Completed: disease/condition, medications, treatment/procedure and equipment/supplies   Teaching Method: written and demonstration   Person Taught: patient   Evaluation Of Learning: verbalized/demonstrated understanding and needs reinforcement      Active Problems    1  ACE-inhibitor cough (786 2,E942 6) (R05,T46 4X5A)   2  Acute upper respiratory infection (465 9) (J06 9)   3  Allergic conjunctivitis (372 14) (H10 10)   4  Benign essential hypertension (401 1) (I10)   5  Depression (311) (F32 9)   6  Encounter for screening colonoscopy (V76 51) (Z12 11)   7  Fall, accidental (E888 9) (W19 XXXA)   8  Fatigue (780 79) (R53 83)   9  Hematoma of lower extremity, right, sequela (906 3) (S80 11XS)   10  Influenza-like symptoms (780 99) (R68 89)   11  Irritable bowel syndrome (564 1) (K58 9)   12  Laceration of right lower extremity, initial encounter (894 0) (S81 811A)   13  Laceration of right lower leg with complication, sequela (670 9) (S81 811S)   14  Lipoma (214 9) (D17 9)   15  Lower back pain (724 2) (M54 5)   16  Menopausal symptoms (627 2) (N95 1)   17  Neuropathy (355 9) (G62 9)   18  Non-healing surgical wound, initial encounter (998 83) (T81 89XA)   19  Nontoxic single thyroid nodule (241 0) (E04 1)   20  OCD (obsessive compulsive disorder) (300 3) (F42)   21  Open wound of right lower extremity (891 0) (S81 801A)   22  Sinus infection (473 9) (J32 9)   23  Sore throat (462) (J02 9)   24  Thyroid disorder (246 9) (E07 9)   25  Traumatic hematoma of right lower leg (924 10) (S80 11XA)   26  Upper respiratory infection (465 9) (J06 9)   27  Vitamin B12 deficiency (266 2) (E53 8)   28  Vitamin D deficiency (268 9) (E55 9)    Past Medical History    1  History of Abnormal weight gain (783 1) (R63 5)   2  History of Cough (786 2) (R05)   3   History of Dysfunction of left Eustachian tube (381 81) (H69 82)   4  History of Dysfunction of right Eustachian tube (381 81) (H69 81)    Surgical History    1  History of  Section   2  History of Complete Colonoscopy   3  History of Gallbladder Surgery   4  History of Tonsillectomy With Adenoidectomy   5  History of Tubal Ligation   6  History of Vaginal Hysterectomy    Family History    1  Family history of aortic stenosis (V17 49) (Z82 49)   2  Family history of heart murmur (V17 49) (Z84 89)   3  Family history of Hypertension, benign    4  Family history of Benign Essential Hypertension   5  Family history of Coronary Artery Stenosis    Social History    · Being A Social Drinker   · Former smoker (Y08 84) (H08 388)   · Single    Current Meds   1  Lidocaine HCl - 4 % External Solution; apply to wound/s prior to debridement at the [facility]   for pain; Therapy: (Recorded:2016) to Recorded   2  Losartan Potassium-HCTZ 50-12 5 MG Oral Tablet; TAKE 1 TABLET DAILY; Therapy: 70XCT2773 to (Evaluate:85Kje5789)  Requested for: 09DRR1249; Last   Rx:2016 Ordered   3  Multivitamins TABS; Therapy: (Recorded:2013) to Recorded   4  Venlafaxine HCl  MG Oral Tablet Extended Release 24 Hour; Take 1 tablet daily; Therapy: 41JDB5552 to (Evaluate:2017)  Requested for: 42YBW8588; Last   Rx:2016 Ordered   5  Vitamin B-12 1000 MCG Oral Tablet; TAKE 1 TABLET DAILY AS DIRECTED; Therapy: 26WNO1602 to (Evaluate:2016) Recorded   6  Vitamin D-3 5000 UNIT TABS; Therapy: (Recorded:2013) to Recorded    Allergies    1   ACE Inhibitors    Vitals  Signs [Data Includes: Current Encounter]   Recorded: 61BDJ2374 03:49PM   Temperature: 97 F  Heart Rate: 80  Respiration: 18  Systolic: 520  Diastolic: 74  Height: 5 ft 3 in  Weight: 195 lb   BMI Calculated: 34 54  BSA Calculated: 1 91  Pain Scale: 0    Physical Exam    Wound #1 Assessment wound #1 Location:, right lower leg, started on 16, Care for this wound started on 6/13/16  Wound Status: not healed  Length: 0 7cm x Width: 4cm x Depth: 0 7cm   Total: 2 8sq cm   Wound Volume: 1 96cm3       Undermining 2 2cm from 12 o'clock to 6 o'clock      Tissue type: Subcutaneous, Granulation, Slough and unable to visualize wound bed   Color of Wound: Red - 90% and Yellow - 10%   Exudate Amount: Large   Exudate Type: Serosangiunous   Odor: None   Exudate Color: Yellow and bloody,brown   Wound Edges: Intact   Periwound Skin Condition: Intact, Erythematous, Edema         Procedure      Wound #1: right lower extremity     Nurse Dressing Change:   Wound #wound one The wound located on the RLE  Wound care rendered as per Physician/Advanced Practitioner order/plan  Return to [address] [place] [address] F/u on 7/1/16  Comments:, Dressings to remain dry and intact      Wound Drsg  Orders/Instructions  Wound Identification Dressing Orders--Instructions:   Wound Identification and Instructions   Wound #1: right lower leg    Wound Care Instructions  Discussed with Patient/Caregiver  Dressing Type: Drawtex (Hydroconductive)  Wash with mild soap and water, normal saline, wound cleanser  As specified, use: Irrigate with NSS  Apply specified dressing to wound base/bed  To periwound apply: Calmoseptine  If using packing in a tunnel, secure tail to skin with tape   Secondary dressing apply: Gauze, 4 x 4  ABD  Secure with: Kerlix, tape, 4x10 inch ace  Dressing change frequency: Three times per week, at [facility]  Comments/Other: Ace wrap (4" x 10') base of toes to knee followed by Tubigrip F  puracol on superficial areas  Apply compression using: Tubigrip F  Wound Goals  Wound Goals:   Healing Goals:   Good healing potential    Wound base will be 25%    Patient will achieve full wound closure and return to full ADLs       Impression  Wound [address]:    Wound Nursing Care Plan   Impaired Tissue Integrity related to:   Knowledge Deficit Related To:   Risk for Infection related to open wound:   Pain related to disease process and/or wound treatment:   Imbalanced Nutrition, less than body requirements related to inadequate intake and/or disease process: inadequate protein intake   Risk for Fall related to criteria noted on Fall Risk Assessment:   Goals   Patient will achieve 100% epithelialization:  Patient will demonstrate and verbalize knowledge of their disease process and management:  Patient will verbalize signs and symptoms of infection and when to notify physician or [facility]:    Patient will demonstrate and verbalize infection control and preventative measures:  Patient will report adequate pain management:  Patient will verbalize knowledge of and demonstrate adherence to interventions to achieve optimal nutrition required for wound healing:  Patient will not experience a fall:  Patient will maintain or achieve adequate hydration: and nutrition  Wound Nursing Care Interventions:   Provide moist wound healing:  Evaluate current medication regime for medications which may slow/impede wound healing:  Teach patient and/or family about disease process and management methods:  Teach patient and/or family about infection control measures (gloving, hand sanitation, MDROs, disposal of soiled dressings, antibiotic therapy): [place] Griffin Assess pain and patients pain tolerance level:  Evaluate effectiveness of pain management interventions:  Assess patient's nutrition on each wound care visit (including protein and fluid intake): [place] Griffin Educate on diet and hydration required to enhance wound healing:  Teach patient and caregiver how to change wound dressing:  Complete Fall Risk Assessment upon admission to wound program and with change in condition/implement identified interventions:    Elevate legs above heart 30 minutes 3 times a day, walk 30 minutes daily, reduced sodium diet, diuretics as ordered, wear compression hose or wrap as ordered:     Other:  Care plan initiated 6/13/16      Future Appointments    Date/Time Provider Specialty Site   07/08/2016 08:30 AM Tho Ramírez MD General Surgery [facility] [address] WOUND CARE   07/05/2016 10:00 AM Karlo Elroy[address]   31/25/8984 17:39 PM Adams Boxer, DO [facility]   07/01/2016 01:00 PM Wound Care [place] Nurse Schedule  [facility]   07/05/2016 08:30 AM Wound Care [place], Nurse Schedule  [facility]   07/06/2016 01:00 PM Wound Care [place], Nurse Schedule  [facility]     Signatures   Electronically signed by : Seth Salvador RN; Jun 29 2016  4:01PM EST                       (Author)

## 2018-01-12 NOTE — PROGRESS NOTES
Chief Complaint  Chief Complaint Free Text Note Form: For nurse dressing change for RLE wound  History of Present Illness  Wound Identification HPI:   Wound Identification HPI   Wound #1: right lower leg        Visit Information:   The patient came to Wound Care and was ambulatory  The patient is being seen for follow-up with RN at the [address] [place] [address]  The patient's identification was verified  A secondary verification process was completed  Orientation: oriented to person, oriented to place and oriented to time  Blood Glucose:  Not applicable  6/13/16 Patient arrives for initial visit for assessment and treatment of Right lower leg laceration  Referred by Dr Tigist Mckinney  On 6/2/16, patient fell down cellar stairs and does not remember how she fell  Blood sugar taken during visit today 91mg/mL Patient did not eat breakfast  To return to PCP for follow up on "Passing out"  Wound sutured with surrounding skin hard and indurated,Patient complaining of pain when area palpated  Continues to work as a  with legs dependent all day  Pitting edema noted of RLE  Pedal pulses palpable  6/15/2016 Patient arrived with dressing intact  RLE appears to be edematous  Patient states that she is experiencing pain in her RLE  Cassidy wound redness decreased, right toes appear to still be ecchymotic    6-17-16-Patient arrived with dressing intact  11cm of AMD was removed from wound base today  Right toes still appear ecchymotic although are resolving  Patient states that she experiences"twinging" pain at times in her RLE  Ecchymosis remains on L lateral LE  6/22/2016 Patient arrived with dressing intact  Strike through drainage present on Tubigrip F  RLE still appears edematous  6/24/2016 Patient arrived with dressing, 4x10 inch ace and Tubigrip F intact  This morning patient states that her dressing did get wet through the cast cover  Patient did finish Cephalexin  6/29/16 Patient arrives with compression and dressings intact on RLE  optifoam has placed pressure on the distal wound edges and there is less edema  7/1/16 arrived with dressing, ace wrap and tubigrip intact to RLE  Offers no complaints and reports she is "better" today  She reports she has been taking Amoxicillin for a dental problem since Tuesday 6/29  Emery Fall Scale:     History of Falling: Yes = 25   Secondary Diagnosis: Yes = 15   Ambulatory Aid None, Bedrest, Nurse Assist = 0   No = 0   Gait: Normal = 0   Mental Status: Oriented to own ability = 0   Total Score: 40    25 - 45 = Moderate Risk        Fall, Nutrition, Mobility, Neuropsychological Assessment: The most recent fall occurred denies any falls since last visit, 7/1/2016  The fall resulted from passed out  The fall was preceded by reports no warning symptoms of fall  Nutrition Assessment Screening: Food intake over the last 3 months due to the loss of appetite, digestive problems, chewing or swallowing difficulties is graded as: 2 = no decrease in food intake   Weight loss during the last 3 months: 3 = no weight loss   Mobility scored as: 2 = goes out  Psychological Stress and Acute Disease Scored as: 2 = The patient has not experienced psychological stress or acute disease in the last 3 months  Neuropsychological problems scored as: 2 = no psychological problems  Body Mass Index (BMI) scored as: 3 = BMI 23 or greater  Nutritional Assessment Screening Score: 8 - 11 points - At risk of malnutrition  Hospital Based Practices Required Assessment:   Pain Assessment   the patient states they have pain  The pain is located in the right lower extremity  The patient describes the pain as Patient states that she experiences "Twinging"pain at times in her RLE  (on a scale of 0 to 10, the patient rates the pain at 3 )   Abuse And Domestic Violence Screen    Yes, the patient is safe at home  The patient states no one is hurting them  Depression And Suicide Screen   No, the patient has not had thoughts of hurting themself  No, the patient has not felt depressed in the past 7 days  Prefered Language is  [place]  Primary Language is  English  Readiness To Learn: Receptive  Barriers To Learning: none  Preferred Learning: demonstration and written   Education Completed: disease/condition, medications, treatment/procedure and equipment/supplies   Teaching Method: written and demonstration   Person Taught: patient   Evaluation Of Learning: verbalized/demonstrated understanding and needs reinforcement      Active Problems    1  ACE-inhibitor cough (786 2,E942 6) (R05,T46 4X5A)   2  Acute upper respiratory infection (465 9) (J06 9)   3  Allergic conjunctivitis (372 14) (H10 10)   4  Benign essential hypertension (401 1) (I10)   5  Depression (311) (F32 9)   6  Encounter for screening colonoscopy (V76 51) (Z12 11)   7  Fall, accidental (E888 9) (W19 XXXA)   8  Fatigue (780 79) (R53 83)   9  Hematoma of lower extremity, right, sequela (906 3) (S80 11XS)   10  Influenza-like symptoms (780 99) (R68 89)   11  Irritable bowel syndrome (564 1) (K58 9)   12  Laceration of right lower extremity, initial encounter (894 0) (S81 811A)   13  Laceration of right lower leg with complication, sequela (728 3) (S81 811S)   14  Lipoma (214 9) (D17 9)   15  Lower back pain (724 2) (M54 5)   16  Menopausal symptoms (627 2) (N95 1)   17  Neuropathy (355 9) (G62 9)   18  Non-healing surgical wound, initial encounter (998 83) (T81 89XA)   19  Nontoxic single thyroid nodule (241 0) (E04 1)   20  OCD (obsessive compulsive disorder) (300 3) (F42)   21  Open wound of right lower extremity (891 0) (S81 801A)   22  Sinus infection (473 9) (J32 9)   23  Sore throat (462) (J02 9)   24  Thyroid disorder (246 9) (E07 9)   25  Traumatic hematoma of right lower leg (924 10) (S80 11XA)   26  Upper respiratory infection (465 9) (J06 9)   27  Vitamin B12 deficiency (266 2) (E53 8)   28   Vitamin D deficiency (268 9) (E55 9)    Past Medical History    1  History of Abnormal weight gain (783 1) (R63 5)   2  History of Cough (786 2) (R05)   3  History of Dysfunction of left Eustachian tube (381 81) (H69 82)   4  History of Dysfunction of right Eustachian tube (381 81) (H69 81)    Surgical History    1  History of  Section   2  History of Complete Colonoscopy   3  History of Gallbladder Surgery   4  History of Tonsillectomy With Adenoidectomy   5  History of Tubal Ligation   6  History of Vaginal Hysterectomy    Family History    1  Family history of aortic stenosis (V17 49) (Z82 49)   2  Family history of heart murmur (V17 49) (Z84 89)   3  Family history of Hypertension, benign    4  Family history of Benign Essential Hypertension   5  Family history of Coronary Artery Stenosis    Social History    · Being A Social Drinker   · Former smoker (V30 71) (J89 559)   · Single    Current Meds   1  Amoxicillin 500 MG Oral Capsule; TAKE 1 CAPSULE 3 TIMES DAILY; Therapy: (Recorded:84Lwg5120) to Recorded   2  Lidocaine HCl - 4 % External Solution; apply to wound/s prior to debridement at the [facility]   for pain; Therapy: (Recorded:2016) to Recorded   3  Losartan Potassium-HCTZ 50-12 5 MG Oral Tablet; TAKE 1 TABLET DAILY; Therapy: 44RDY6777 to (Evaluate:40Qip6245)  Requested for: 58ZHP1438; Last   Rx:2016 Ordered   4  Multivitamins TABS; Therapy: (Recorded:33Jqu0654) to Recorded   5  Venlafaxine HCl  MG Oral Tablet Extended Release 24 Hour; Take 1 tablet daily; Therapy: 37AUL2430 to (Evaluate:39Gnz6538)  Requested for: 91EHZ7155; Last   Rx:2016 Ordered   6  Vitamin B-12 1000 MCG Oral Tablet; TAKE 1 TABLET DAILY AS DIRECTED; Therapy: 20WKN8030 to (Evaluate:2016) Recorded   7  Vitamin D-3 5000 UNIT TABS; Therapy: (Recorded:2013) to Recorded    Allergies    1   ACE Inhibitors    Vitals  Signs [Data Includes: Current Encounter]   Recorded: 27KWS9318 03:30PM   Temperature: 98 3 F, Tympanic  Heart Rate: 88  Respiration: 18  Systolic: 684  Diastolic: 78  Height: 5 ft 3 in  Weight: 195 lb   BMI Calculated: 34 54  BSA Calculated: 1 91  Pain Scale: 3  Recorded: 19UJJ8202 03:28PM   Temperature: 98 3 F, Tympanic  Heart Rate: 88  Respiration: 18  Systolic: 953  Diastolic: 78  Height: 5 ft 3 in  Weight: 195 lb   BMI Calculated: 34 54  BSA Calculated: 1 91  Pain Scale: 3  Recorded: 96XUB3045 01:33PM   Temperature: 98 3 F, Tympanic  Heart Rate: 88  Respiration: 18  Systolic: 454  Diastolic: 78  Height: 5 ft 3 in  Weight: 195 lb   BMI Calculated: 34 54  BSA Calculated: 1 91  Pain Scale: 3    Physical Exam    Wound #1 Assessment wound #1 Location:, right lower leg, started on 6/2/16, Care for this wound started on 6/13/16  Wound Status: not healed  Length: 0 7cm x Width: 4 5cm x Depth: 0 7cm   Total: 3 15sq cm   Wound Volume: 2 205cm3   Tunneling 0 8cm at 3 o'clock          Tissue type: Subcutaneous, Granulation and Slough   Color of Wound: Red - 80% and Yellow - 20%   Exudate Amount: Minimal   Exudate Type: Serosangiunous   Odor: None   Exudate Color: Tan   Wound Edges: Intact and Epidermal Migration   Periwound Skin Condition: Intact, Edema           Procedure      Wound #1: RLE     Nurse Dressing Change:   Wound #1 The wound located on the RLE  Wound care rendered as per Physician/Advanced Practitioner order/plan  Return to [address] [place] [address] Tuesday 7/5 for nurse dressing change  Comments:      Wound Drsg  Orders/Instructions  Wound Identification Dressing Orders--Instructions:   Wound Identification and Instructions   Wound #1: RLE    Wound Care Instructions  Discussed with Patient/Caregiver  Dressing Type: Acticoat 7  Wash with mild soap and water, normal saline, wound cleanser  As specified, use: DO NOT GET WET  Apply specified dressing to wound base/bed  To periwound apply: Calmoseptine  Secondary dressing apply: Gauze, ABD  Secure with: Kerlix, tape  Dressing change frequency:  Three times per week, at [facility]  Comments/Other:   Drawtex over acticoat 7 today until next dressing change on   Then stop acticoat 7  Apply compression usin" Ace Wrap and Tubigrip F  Wound Goals  Wound Goals:   Healing Goals:   Good healing potential    Wound base will be 25%    Patient will achieve full wound closure and return to full ADLs       Impression  Wound [address]: Wound Nursing Care Plan   Impaired Tissue Integrity related to:   Knowledge Deficit Related To:   Risk for Infection related to open wound:   Pain related to disease process and/or wound treatment:   Imbalanced Nutrition, less than body requirements related to inadequate intake and/or disease process: inadequate protein intake   Risk for Fall related to criteria noted on Fall Risk Assessment:   Goals   Patient will achieve 100% epithelialization:  Patient will demonstrate and verbalize knowledge of their disease process and management:  Patient will verbalize signs and symptoms of infection and when to notify physician or [facility]:    Patient will demonstrate and verbalize infection control and preventative measures:  Patient will report adequate pain management:  Patient will verbalize knowledge of and demonstrate adherence to interventions to achieve optimal nutrition required for wound healing:  Patient will not experience a fall:  Patient will maintain or achieve adequate hydration: and nutrition  Wound Nursing Care Interventions:   Provide moist wound healing:  Evaluate current medication regime for medications which may slow/impede wound healing:  Teach patient and/or family about disease process and management methods:  Teach patient and/or family about infection control measures (gloving, hand sanitation, MDROs, disposal of soiled dressings, antibiotic therapy): Shantell Lopez Assess pain and patients pain tolerance level:  Evaluate effectiveness of pain management interventions:     Assess patient's nutrition on each wound care visit (including protein and fluid intake): Finesse Baez Educate on diet and hydration required to enhance wound healing:  Teach patient and caregiver how to change wound dressing:  Complete Fall Risk Assessment upon admission to wound program and with change in condition/implement identified interventions:    Elevate legs above heart 30 minutes 3 times a day, walk 30 minutes daily, reduced sodium diet, diuretics as ordered, wear compression hose or wrap as ordered:     Other:  Care plan initiated 6/13/16      Future Appointments    Date/Time Provider Specialty Site   07/08/2016 08:30 AM Rahul Moise MD General Surgery [facility]   07/05/2016 10:00 AM Argenis Chew, [address] [place] [address]   13/89/4593 41:46 PM Rosemary Ceron DO [facility]   07/05/2016 08:30 AM Wound Care [place], Nurse Schedule  [facility]   07/06/2016 01:00 PM Wound Care [facility], Nurse Schedule  [facility] WOUND CARE     Signatures   Electronically signed by : Gab Hummel RN; Jul 1 2016  1:30PM EST                       (Author)    Electronically signed by : Gab Hummel RN; Jul 1 2016  1:33PM EST                       (Author)    Electronically signed by : Gab Hummel RN; Jul 1 2016  3:28PM EST                       (Author)    Electronically signed by : Gab Hummel RN; Jul 1 2016  3:31PM EST                       (Author)

## 2018-01-12 NOTE — MISCELLANEOUS
Physical Exam    Wound #1 Assessment wound #1 Location:, right lower leg (multi), started on 16, Care for this wound started on 16  Wound Status: not healed  Length: 0 7cm x Width: 3cm x Depth: 0 6cm   Total: 2 1sq cm   Wound Volume: 1 26cm3   Tunneling 0 7cm at 3 o'clock          Tissue type: Subcutaneous, Granulation and Slough   Color of Wound: Red - 80% and Yellow - 20%   Exudate Amount: Minimal   Exudate Type: Serosangiunous   Odor: None   Exudate Color: bloody   Wound Edges: Intact and Epidermal Migration   Periwound Skin Condition: Intact, Edema   Comments: now 2 wounds- skin bridge is present in center of wound  Wound Drsg  Orders/Instructions  Wound Identification Dressing Orders--Instructions:   Wound Identification and Instructions   Wound #1: RLE (multi)    Wound Care Instructions  Discussed with Patient/Caregiver  Dressing Type: Acticoat 3  Wash with mild soap and water, normal saline, wound cleanser  As specified, use: DO NOT GET WET  Apply specified dressing to wound base/bed  To periwound apply: Calmoseptine  Secondary dressing apply: Gauze, Optifoam, ABD  Secure with: Kerlix, tape  Dressing change frequency: Three times per week, at [facility]  Comments/Other:   Drawtex over acticoat 3  Puracol on superficial lateral areas  Next dressing change on   Apply compression usin" Ace Wrap and Tubigrip F  Future Appointments    Date/Time Provider Specialty Site   2016 08:30 AM Doug Molina MD General Surgery Located within [facility]   2016 10:00 AM [place] [address] 51:05 PM Mike Grey DO Family Medicine TOTAL [facility]     [address] Plan  Wound Nursing Care Plan [facility]:    Wound Nursing Care Plan   Impaired Tissue Integrity related to:   Knowledge Deficit Related To:   Risk for Infection related to open wound:   Pain related to disease process and/or wound treatment:   Imbalanced Nutrition, less than body requirements related to inadequate intake and/or disease process: inadequate protein intake   Risk for Fall related to criteria noted on Fall Risk Assessment:   Goals   Patient will achieve 100% epithelialization:  Patient will demonstrate and verbalize knowledge of their disease process and management:  Patient will verbalize signs and symptoms of infection and when to notify physician or [facility]:    Patient will demonstrate and verbalize infection control and preventative measures:  Patient will report adequate pain management:  Patient will verbalize knowledge of and demonstrate adherence to interventions to achieve optimal nutrition required for wound healing:  Patient will not experience a fall:  Patient will maintain or achieve adequate hydration: and nutrition  Wound Nursing Care Interventions:   Provide moist wound healing:  Evaluate current medication regime for medications which may slow/impede wound healing:  Teach patient and/or family about disease process and management methods:  Teach patient and/or family about infection control measures (gloving, hand sanitation, MDROs, disposal of soiled dressings, antibiotic therapy): Ashleigh Wakulla Assess pain and patients pain tolerance level:  Evaluate effectiveness of pain management interventions:  Assess patient's nutrition on each wound care visit (including protein and fluid intake): Ashleigh Wakulla Educate on diet and hydration required to enhance wound healing:  Teach patient and caregiver how to change wound dressing:  Complete Fall Risk Assessment upon admission to wound program and with change in condition/implement identified interventions:    Elevate legs above heart 30 minutes 3 times a day, walk 30 minutes daily, reduced sodium diet, diuretics as ordered, wear compression hose or wrap as ordered:     Other:  Care plan initiated 6/13/16      Signatures   Electronically signed by : Samantha Monreal RN; Jul 5 2016  9:09AM EST (Author)

## 2018-01-12 NOTE — MISCELLANEOUS
Physical Exam    Wound #1 Assessment wound #1 Location:, right lower leg, started on 6/2/16, Care for this wound started on 6/13/16  Wound Status: not healed  Length: 1 4cm x Width: 5cm x Depth: 1 2cm   Total: 7sq cm   Wound Volume: 8 4cm3       Undermining 2 2cm from 12 o'clock to 6 o'clock      Tissue type: Subcutaneous, Granulation, Slough and unable to visualize wound bed   Color of Wound: Red - 90% and Yellow - 10%   Exudate Amount: Large   Exudate Type: Serosangiunous   Odor: None   Exudate Color: Yellow and bloody,brown   Wound Edges: Intact   Periwound Skin Condition: Intact, Erythematous, Edema        Wound Drsg  Orders/Instructions  Wound Identification Dressing Orders--Instructions:   Wound Identification and Instructions   Wound #1: right lower leg    Wound Care Instructions  Discussed with Patient/Caregiver  Dressing Type: AMD  Wash with mild soap and water, normal saline, wound cleanser  As specified, use: Irrigate with NSS  Apply specified dressing to wound base/bed  To periwound apply: Calmoseptine  If using packing in a tunnel, secure tail to skin with tape   Secondary dressing apply: Gauze, 4 x 4  ABD  Secure with: Kerlix, tape, 4x10 inch ace  Dressing change frequency: Three times per week, at [facility]  Comments/Other: Ace wrap (4" x 10') base of toes to knee followed by Tubigrip F  Apply compression using: Tubigrip F  Future Appointments    Date/Time Provider Specialty Site   06/27/2016 01:15 PM Rahul Moise MD General Surgery [facility] WOUND CARE   07/05/2016 10:00 AM Argenis Chew, [address] [place] [address]   38/45/0511 31:12 PM Rosemary Ceron, DO Family Medicine [facility]     [address] Plan  Wound Nursing Care Plan [facility]:    Wound Nursing Care Plan   Impaired Tissue Integrity related to:   Knowledge Deficit Related To:   Risk for Infection related to open wound:   Pain related to disease process and/or wound treatment:   Imbalanced Nutrition, less than body requirements related to inadequate intake and/or disease process: inadequate protein intake   Risk for Fall related to criteria noted on Fall Risk Assessment:   Goals   Patient will achieve 100% epithelialization:  Patient will demonstrate and verbalize knowledge of their disease process and management:  Patient will verbalize signs and symptoms of infection and when to notify physician or [facility]:    Patient will demonstrate and verbalize infection control and preventative measures:  Patient will report adequate pain management:  Patient will verbalize knowledge of and demonstrate adherence to interventions to achieve optimal nutrition required for wound healing:  Patient will not experience a fall:  Patient will maintain or achieve adequate hydration: and nutrition  Wound Nursing Care Interventions:   Provide moist wound healing:  Evaluate current medication regime for medications which may slow/impede wound healing:  Teach patient and/or family about disease process and management methods:  Teach patient and/or family about infection control measures (gloving, hand sanitation, MDROs, disposal of soiled dressings, antibiotic therapy): Milton Viramontes Assess pain and patients pain tolerance level:  Evaluate effectiveness of pain management interventions:  Assess patient's nutrition on each wound care visit (including protein and fluid intake): Milton Viramontes Educate on diet and hydration required to enhance wound healing:  Teach patient and caregiver how to change wound dressing:  Complete Fall Risk Assessment upon admission to wound program and with change in condition/implement identified interventions:    Elevate legs above heart 30 minutes 3 times a day, walk 30 minutes daily, reduced sodium diet, diuretics as ordered, wear compression hose or wrap as ordered:     Other:  Care plan initiated 6/13/16      Signatures   Electronically signed by : Shamar Linares RN; Jun 24 2016  3:25PM EST (Author)    Electronically signed by : Jayme Muir RN; Jun 24 2016  3:33PM EST                       (Author)

## 2018-01-12 NOTE — MISCELLANEOUS
Physical Exam    Wound #1 Assessment wound #1 Location:, right lower leg, started on 6/2/16, Care for this wound started on 6/13/16  Wound Status: not healed  Length: 4 8cm x Width: 5 6cm x Depth: 1cm   Total: 26 88sq cm   Wound Volume: 26 88cm3       Undermining 2 5cm from 2 o'clock to 3 o'clock   Undermining 2 9cm from 5 o'clock to 6 o'clock   Undermining 1cm from 12 o'clock to    Tissue type: Granulation and Eschar   Color of Wound: Red - 95% and Black - 5%   Exudate Amount: Moderate   Exudate Type: Serosangiunous   Odor: None   Exudate Color: Yellow   Wound Edges: Intact   Periwound Skin Condition: Intact, Erythematous, Edema           Wound Drsg  Orders/Instructions  Wound Identification Dressing Orders--Instructions:   Wound Identification and Instructions   Wound #1: right lower leg    Wound Care Instructions  Discussed with Patient/Caregiver  Dressing Type: AMD  Wash with mild soap and water, normal saline, wound cleanser  As specified, use: Irrigate with NSS  Apply specified dressing to wound base/bed  If using packing in a tunnel, secure tail to skin with tape   Secondary dressing apply: Gauze, 4 x 4  ABD  Secure with: Natalie, tape  Dressing change frequency: Three times per week, at [facility]  Comments/Other: Ace wrap (4" x 10') base of toes to knee  Apply compression using: Tubigrip F  Future Appointments    Date/Time Provider Specialty Site   06/20/2016 02:15 PM Anjleica Mora MD General Surgery [facility] WOUND CARE   02/26/6704 72:86 PM Medardo Schultz DO [facility]   06/17/2016 01:30 PM Wound Care Interior, Nurse Schedule   [facility] Care Plan  Wound Nursing Care Plan Margot Lu:    Wound Nursing Care Plan   Impaired Tissue Integrity related to:   Knowledge Deficit Related To:   Risk for Infection related to open wound:   Pain related to disease process and/or wound treatment:   Imbalanced Nutrition, less than body requirements related to inadequate intake and/or disease process: inadequate protein intake   Risk for Fall related to criteria noted on Fall Risk Assessment:   Goals   Patient will achieve 100% epithelialization:  Patient will demonstrate and verbalize knowledge of their disease process and management:  Patient will verbalize signs and symptoms of infection and when to notify physician or [facility]:    Patient will demonstrate and verbalize infection control and preventative measures:  Patient will report adequate pain management:  Patient will verbalize knowledge of and demonstrate adherence to interventions to achieve optimal nutrition required for wound healing:  Patient will not experience a fall:  Patient will maintain or achieve adequate hydration: and nutrition  Wound Nursing Care Interventions:   Provide moist wound healing:  Evaluate current medication regime for medications which may slow/impede wound healing:  Teach patient and/or family about disease process and management methods:  Teach patient and/or family about infection control measures (gloving, hand sanitation, MDROs, disposal of soiled dressings, antibiotic therapy): Robyn Sky Assess pain and patients pain tolerance level:  Evaluate effectiveness of pain management interventions:  Assess patient's nutrition on each wound care visit (including protein and fluid intake): Robyn Sky Educate on diet and hydration required to enhance wound healing:  Teach patient and caregiver how to change wound dressing:  Complete Fall Risk Assessment upon admission to wound program and with change in condition/implement identified interventions:    Elevate legs above heart 30 minutes 3 times a day, walk 30 minutes daily, reduced sodium diet, diuretics as ordered, wear compression hose or wrap as ordered:     Other:  Care plan initiated 6/13/16      Signatures   Electronically signed by : Carmen Og RN; Asad 15 2016  1:51PM EST                       (Author)

## 2018-01-13 LAB — BACTERIA UR CULT: NORMAL

## 2018-01-13 NOTE — PROGRESS NOTES
Chief Complaint  Chief Complaint Free Text Note Form: Nurse dressing change for RLE, multi wound  History of Present Illness  Wound Identification HPI:   Wound Identification HPI   Wound #1: right lower leg (multi)        Visit Information:   The patient came to Wound Care and was ambulatory  The patient is being seen for a follow-up with MD at the [address] [place] [address]  The patient is accompanied by her self  The patient's identification was verified  A secondary verification process was completed  Orientation: oriented to person, oriented to place and oriented to time  Blood Glucose:  Not applicable  6/13/16 Patient arrives for initial visit for assessment and treatment of Right lower leg laceration  Referred by Dr Tigist Mckinney  On 6/2/16, patient fell down cellar stairs and does not remember how she fell  Blood sugar taken during visit today 91mg/mL Patient did not eat breakfast  To return to PCP for follow up on "Passing out"  Wound sutured with surrounding skin hard and indurated,Patient complaining of pain when area palpated  Continues to work as a  with legs dependent all day  Pitting edema noted of RLE  Pedal pulses palpable  6/15/2016 Patient arrived with dressing intact  RLE appears to be edematous  Patient states that she is experiencing pain in her RLE  Cassidy wound redness decreased, right toes appear to still be ecchymotic    6-17-16-Patient arrived with dressing intact  11cm of AMD was removed from wound base today  Right toes still appear ecchymotic although are resolving  Patient states that she experiences"twinging" pain at times in her RLE  Ecchymosis remains on L lateral LE  6/22/2016 Patient arrived with dressing intact  Strike through drainage present on Tubigrip F  RLE still appears edematous  6/24/2016 Patient arrived with dressing, 4x10 inch ace and Tubigrip F intact  This morning patient states that her dressing did get wet through the cast cover   Patient did finish Cephalexin  6/29/16 Patient arrives with compression and dressings intact on RLE  optifoam has placed pressure on the distal wound edges and there is less edema  7/1/16 arrived with dressing, ace wrap and tubigrip intact to RLE  Offers no complaints and reports she is "better" today  She reports she has been taking Amoxicillin for a dental problem since Tuesday 6/29 7-5-16 arrived today with dressing contact to RLE  Reports dressing "got wet" this AM in the shower so she took the kerlix off  Remaining dressing dry upon arrival  Skin bridge is present in wound converting wound to multi (2) wound)  Reports is seeing PCP today after this visit regarding falls  7/8/2016 Patient arrived with dressing intact  Patient had an CT-Scan of her head on 7/6/2016 and chest x-ray to help r/o while patient fell 5 weeks ago  7/11/2016 Patient arrived with dressing intact  Patient states that she prefers to wear the 4x10 inch ace  Patient reports that her RLE feels better with the ace applied and it also helps keep the dressing intact  Emery Fall Scale:     History of Falling: Yes = 25   Secondary Diagnosis: Yes = 15   Ambulatory Aid None, Bedrest, Nurse Assist = 0   No = 0   Gait: Normal = 0   Mental Status: Oriented to own ability = 0   Total Score: 40    25 - 45 = Moderate Risk        Fall, Nutrition, Mobility, Neuropsychological Assessment: The most recent fall occurred denies any falls since last visit, 7/11/2016  The fall resulted from passed out  The fall was preceded by reports no warning symptoms of fall  Nutrition Assessment Screening: Food intake over the last 3 months due to the loss of appetite, digestive problems, chewing or swallowing difficulties is graded as: 2 = no decrease in food intake   Weight loss during the last 3 months: 3 = no weight loss   Mobility scored as: 2 = goes out     Psychological Stress and Acute Disease Scored as: 2 = The patient has not experienced psychological stress or acute disease in the last 3 months  Neuropsychological problems scored as: 2 = no psychological problems  Body Mass Index (BMI) scored as: 3 = BMI 23 or greater  Nutritional Assessment Screening Score: 8 - 11 points - At risk of malnutrition  Hospital Based Practices Required Assessment:   Pain Assessment   the patient states they do not have pain  The pain is located in the denies  The pain radiates to the denies  The patient describes the pain as  (on a scale of 0 to 10, the patient rates the pain at 0 )   Abuse And Domestic Violence Screen    Yes, the patient is safe at home  The patient states no one is hurting them  Depression And Suicide Screen  No, the patient has not had thoughts of hurting themself  No, the patient has not felt depressed in the past 7 days  Prefered Language is  [place]  Primary Language is  English  Readiness To Learn: Receptive  Barriers To Learning: none  Preferred Learning: demonstration and written   Education Completed: disease/condition, medications, treatment/procedure and equipment/supplies   Teaching Method: written and demonstration   Person Taught: patient   Evaluation Of Learning: verbalized/demonstrated understanding and needs reinforcement      Active Problems    1  ACE-inhibitor cough (786 2,E942 6) (R05,T46 4X5A)   2  Acute upper respiratory infection (465 9) (J06 9)   3  Allergic conjunctivitis (372 14) (H10 10)   4  Benign essential hypertension (401 1) (I10)   5  Change in mental status (780 97) (R41 82)   6  Chest pain (786 50) (R07 9)   7  Depression (311) (F32 9)   8  Encounter for screening colonoscopy (V76 51) (Z12 11)   9  Fall, accidental (E888 9) (W19 XXXA)   10  Fatigue (780 79) (R53 83)   11  Hematoma of lower extremity, right, sequela (906 3) (S80 11XS)   12  Influenza-like symptoms (780 99) (R68 89)   13  Irritable bowel syndrome (564 1) (K58 9)   14  Laceration of right lower extremity, initial encounter (894 0) (S81 811A)   15   Laceration of right lower leg with complication, sequela (451 1) (S81 811S)   16  Lipoma (214 9) (D17 9)   17  Lower back pain (724 2) (M54 5)   18  Menopausal symptoms (627 2) (N95 1)   19  Neuropathy (355 9) (G62 9)   20  Non-healing surgical wound, initial encounter (998 83) (T81 89XA)   21  Nontoxic single thyroid nodule (241 0) (E04 1)   22  Numbness and tingling of right arm (782 0) (R20 0,R20 2)   23  OCD (obsessive compulsive disorder) (300 3) (F42)   24  Open wound of right lower extremity (891 0) (S81 801A)   25  Sinus infection (473 9) (J32 9)   26  Sore throat (462) (J02 9)   27  Syncope (780 2) (R55)   28  Thyroid disorder (246 9) (E07 9)   29  Traumatic hematoma of right lower leg (924 10) (S80 11XA)   30  Upper respiratory infection (465 9) (J06 9)   31  Vitamin B12 deficiency (266 2) (E53 8)   32  Vitamin D deficiency (268 9) (E55 9)    Past Medical History    1  History of Abnormal weight gain (783 1) (R63 5)   2  History of Cough (786 2) (R05)   3  History of Dysfunction of left Eustachian tube (381 81) (H69 82)   4  History of Dysfunction of right Eustachian tube (381 81) (H69 81)    Surgical History    1  History of  Section   2  History of Complete Colonoscopy   3  History of Gallbladder Surgery   4  History of Tonsillectomy With Adenoidectomy   5  History of Tubal Ligation   6  History of Vaginal Hysterectomy    Family History    1  Family history of aortic stenosis (V17 49) (Z82 49)   2  Family history of heart murmur (V17 49) (Z84 89)   3  Family history of Hypertension, benign    4  Family history of Benign Essential Hypertension   5  Family history of Coronary Artery Stenosis    Social History    · Being A Social Drinker   · Former smoker (X75 91) (U70 393)   · Single    Current Meds   1  Losartan Potassium-HCTZ 50-12 5 MG Oral Tablet; TAKE 1 TABLET DAILY; Therapy: 69AGU6889 to (Evaluate:66Fmg4556)  Requested for: 96XNV0567; Last   Rx:2016 Ordered   2  Multivitamins TABS;    Therapy: (Recorded:59Cgb7251) to Recorded   3  Venlafaxine HCl  MG Oral Tablet Extended Release 24 Hour; Take 1 tablet daily; Therapy: 76KQZ2701 to (Evaluate:24Feb2017)  Requested for: 67LQK5574; Last   Rx:01Mar2016 Ordered   4  Vitamin B-12 1000 MCG Oral Tablet; TAKE 1 TABLET DAILY AS DIRECTED; Therapy: 76VYB4042 to (Evaluate:23Jan2016) Recorded   5  Vitamin D-3 5000 UNIT TABS; Therapy: (Recorded:22Feb2013) to Recorded    Allergies    1  ACE Inhibitors    Vitals  Signs [Data Includes: Current Encounter]   Recorded: 70Ppy3632 03:15PM   Temperature: 96 6 F, Tympanic  Heart Rate: 68, R Radial  Pulse Quality: Normal, R Radial  Respiration: 18  Respiration Quality: Normal  Systolic: 422, RUE, Sitting  Diastolic: 74, RUE, Sitting  Height: 5 ft 3 in  Weight: 195 lb   BMI Calculated: 34 54  BSA Calculated: 1 91  Pain Scale: 0    Physical Exam    Wound #1 Assessment wound #1 Location:, right lower leg (multi), started on 6/2/16, Care for this wound started on 6/13/16  Wound Status: not healed  Length: 0 5cm x Width: 2 6cm x Depth: 0 4cm   Total: 1 3sq cm   Wound Volume: 0 52cm3   Tunneling 0 5cm at 3 o'clock          Tissue type: Subcutaneous, Granulation and Slough   Color of Wound: Red - 80% and Yellow - 20%   Exudate Amount: Minimal   Exudate Type: Serosangiunous   Odor: None   Exudate Color: brown   Wound Edges: Intact and Epidermal Migration   Periwound Skin Condition: Intact, Edema   Comments: slowly improving  Procedure      Wound #1: right lower extremity multi wound     Nurse Dressing Change:   Wound #wound one The wound located on the RLE  Wound care rendered as per Physician/Advanced Practitioner order/plan  Return to [address] [place] [address] F/u on 7/13/16 for Nurse dressing change     Comments:      Wound Drsg  Orders/Instructions  Wound Identification Dressing Orders--Instructions:   Wound Identification and Instructions   Wound #1: right lower extremity multi wound  Wound Care Instructions  Discussed with Patient/Caregiver  Dressing Type: Acticoat 3  Wash with mild soap and water, normal saline, wound cleanser  As specified, use: NSS  Apply specified dressing to wound base/bed  To periwound apply: Calmoseptine  Secondary dressing apply: Gauze, 4x4,5x9  Secure with: Kerlix, tape  Dressing change frequency: Three times per week, at [facility]  Comments/Other:   Acticoat 3 in tunnel and Puracol on superficial areas of wound, 4x10 inch ace and Tubigrip F  Apply compression using: Tubigrip F  Wound Goals  Wound Goals:   Healing Goals:   Good healing potential    Wound base will be 25%    Patient will achieve full wound closure and return to full ADLs       Impression  Wound [address]: Wound Nursing Care Plan   Impaired Tissue Integrity related to:   Knowledge Deficit Related To:   Risk for Infection related to open wound:   Pain related to disease process and/or wound treatment:   Imbalanced Nutrition, less than body requirements related to inadequate intake and/or disease process: inadequate protein intake   Risk for Fall related to criteria noted on Fall Risk Assessment:   Goals   Patient will achieve 100% epithelialization:  Patient will demonstrate and verbalize knowledge of their disease process and management:  Patient will verbalize signs and symptoms of infection and when to notify physician or [facility]:    Patient will demonstrate and verbalize infection control and preventative measures:  Patient will report adequate pain management:  Patient will verbalize knowledge of and demonstrate adherence to interventions to achieve optimal nutrition required for wound healing:  Patient will not experience a fall:  Patient will maintain or achieve adequate hydration: and nutrition  Wound Nursing Care Interventions:   Provide moist wound healing:  Evaluate current medication regime for medications which may slow/impede wound healing:     Teach patient and/or family about disease process and management methods:  Teach patient and/or family about infection control measures (gloving, hand sanitation, MDROs, disposal of soiled dressings, antibiotic therapy): Finesse Baez Assess pain and patients pain tolerance level:  Evaluate effectiveness of pain management interventions:  Assess patient's nutrition on each wound care visit (including protein and fluid intake): Finesse Baez Educate on diet and hydration required to enhance wound healing:  Teach patient and caregiver how to change wound dressing:  Complete Fall Risk Assessment upon admission to wound program and with change in condition/implement identified interventions:    Elevate legs above heart 30 minutes 3 times a day, walk 30 minutes daily, reduced sodium diet, diuretics as ordered, wear compression hose or wrap as ordered:     Other:  Care plan initiated 6/13/16      Future Appointments    Date/Time Provider Specialty Site   07/22/2016 08:30 AM Rahul Moise MD General Surgery [facility]   47/08/9319 15:61 PM Rosemary Osmany, [address] [place] [address]   07/13/2016 02:00 PM Wound Care Bisi Nurse Schedule  [facility]   07/15/2016 02:00 PM Wound Care Kristal Nurse Schedule  [facility] WOUND CARE     Signatures   Electronically signed by : Gab Hummel RN; Jul 11 2016  4:12PM EST                       (Author)

## 2018-01-13 NOTE — PROGRESS NOTES
Assessment   1  Acute myofascial strain of lumbosacral region, initial encounter (846 0) (S39 012A)    Plan   Acute left-sided low back pain with left-sided sciatica    · Methocarbamol 500 MG Oral Tablet; Take 1 tablet by mouth  every 6 hours as    needed for pain   · Oxycodone-Acetaminophen 5-325 MG Oral Tablet; TAKE 1 TABLET EVERY 6    HOURS   · PredniSONE 10 MG Oral Tablet; 6-5-4-3-2-1 P  O  as directed  Lower back pain    · Urine Dip Non-Automated- POC; Status:Complete;   Done: 28SGB4291 02:08PM    Chief Complaint   severe back pain started over the weekend - feels like it might be her kidneys      History of Present Illness   HPI: fatigiue and yucky over the weekend , the bilateral Mid and lower back pain that then radiates to the front  Urine has a slight odor to it  No bowel changes  Maybe felt some chills and general fatigue  Review of Systems        Constitutional: recent 25 planned lb weight gain  Active Problems   1  Acute upper respiratory infection (465 9) (J06 9)   2  Allergic conjunctivitis (372 14) (H10 10)   3  Benign essential hypertension (401 1) (I10)   4  Cervical radiculopathy (723 4) (M54 12)   5  Change in mental status (780 97) (R41 82)   6  Chest pain (786 50) (R07 9)   7  Depression (311) (F32 9)   8  Encounter for screening colonoscopy (V76 51) (Z12 11)   9  Fatigue (780 79) (R53 83)   10  Hematoma of lower extremity, right, sequela (906 3) (S80 11XS)   11  Influenza-like symptoms (780 99) (R68 89)   12  Irritable bowel syndrome (564 1) (K58 9)   13  Knee pain, left (719 46) (M25 562)   14  Lipoma (214 9) (D17 9)   15  Lower back pain (724 2) (M54 5)   16  Menopausal symptoms (627 2) (N95 1)   17  Need for hepatitis B screening test (V73 89) (Z11 59)   18  Neuropathy (355 9) (G62 9)   19  Non-healing surgical wound, initial encounter (998 83) (T81 89XA)   20  Nontoxic single thyroid nodule (241 0) (E04 1)   21  Numbness and tingling of right arm (782 0) (R20 0,R20 2)   22   OCD (obsessive compulsive disorder) (300 3) (F42 9)   23  Open wound of right lower extremity (891 0) (S81 801A)   24  Sinus infection (473 9) (J32 9)   25  Sore throat (462) (J02 9)   26  Strain of lumbar region, initial encounter (847 2) (S39 012A)   27  Syncope (780 2) (R55)   28  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)   29  Thyroid disorder (246 9) (E07 9)   30  Traumatic hematoma of right lower leg (924 10) (S80 11XA)   31  Upper respiratory infection (465 9) (J06 9)   32  Viral syndrome (079 99) (B34 9)   33  Vitamin B12 deficiency (266 2) (E53 8)   34  Vitamin D deficiency (268 9) (E55 9)    Past Medical History   1  History of Abnormal weight gain (783 1) (R63 5)   2  History of ACE-inhibitor cough (786 2,E942 6) (R05,T46 4X5A)   3  History of Cough (786 2) (R05)   4  History of Dysfunction of left eustachian tube (381 81) (H69 82)   5  History of Dysfunction of right eustachian tube (381 81) (H69 81)   6  History of Fall, accidental (E888 9) (I34 SGGO)  Active Problems And Past Medical History Reviewed: The active problems and past medical history were reviewed and updated today  Family History   Mother    1  Family history of aortic stenosis (V17 49) (Z82 49)   2  Family history of heart murmur (V17 49) (Z84 89)   3  Family history of Hypertension, benign  Family History    4  Family history of Benign Essential Hypertension   5  Family history of Coronary Artery Stenosis  Family History Reviewed: The family history was reviewed and updated today  Social History    · Being A Social Drinker   ·    · Former smoker (V19 65) (M27 168)   · quit    · Single  The social history was reviewed and updated today  Surgical History   1  History of  Section   2  History of Complete Colonoscopy   3  History of Gallbladder Surgery   4  History of Tonsillectomy With Adenoidectomy   5  History of Tubal Ligation   6  History of Vaginal Hysterectomy  Surgical History Reviewed:     The surgical history was reviewed and updated today  Current Meds    1  Losartan Potassium 50 MG Oral Tablet; Take 1 tablet daily; Therapy: 04NXC1374 to (Evaluate:14Jan2018)  Requested for: 85GFY7404; Last     Rx:19Jan2017 Ordered   2  Multivitamins TABS; Therapy: (Recorded:59Nzd2000) to Recorded   3  Venlafaxine HCl  MG Oral Capsule Extended Release 24 Hour; TAKE ONE     CAPSULE BY MOUTH EVERY DAY; Therapy: 02YCN2008 to (Susy Roblero)  Requested for: 85UCY2067; Last     Rx:85Ieq0159 Ordered   4  Vitamin B-12 1000 MCG Oral Tablet; TAKE 1 TABLET DAILY AS DIRECTED; Therapy: 91CTA3370 to (Evaluate:23Jan2016) Recorded   5  Vitamin C-Tonya Hips ER 1000 MG Oral Tablet Extended Release; TAKE 1 TABLET     DAILY; Therapy: 71TYL1531 to Recorded   6  Vitamin D-3 5000 UNIT Oral Tablet; Therapy: (Recorded:05Kmf6570) to Recorded     The medication list was reviewed and updated today  Allergies   1   ACE Inhibitors    Vitals    Recorded: 67JID0399 02:21PM Recorded: 35WFG1733 41:43PP   Systolic 370    Diastolic 88    Height  5 ft 2 5 in   Weight  170 lb 2 oz   BMI Calculated  30 62   BSA Calculated  1 79     Results/Data   Urine Dip Non-Automated- POC 36EOP4441 22:33VF Wendi Mcleod      Test Name Result Flag Reference   Color Karlee     Clarity Cloudy     Leukocytes -     Nitrite -     Blood -     Bilirubin -     Urobilinogen 0 2     Protein +/-     Ph 5 0     Specific Gravity 1 025     Ketone +++     Glucose -          Future Appointments      Date/Time Provider Specialty Site   20/30/6792 98:20 PM Wendi Mcleod DO Family Medicine [facility]     Signatures    Electronically signed by : Steph Herrera DO; Jan 12 4634 12:45PM EST                       (Author)

## 2018-01-13 NOTE — PROGRESS NOTES
Assessment    1  Benign essential hypertension (401 1) (I10)   2  Nontoxic single thyroid nodule (241 0) (E04 1)   3  Vitamin B12 deficiency (266 2) (E53 8)   4  Vitamin D deficiency (268 9) (E55 9)   5  Menopausal symptoms (627 2) (N95 1)    Plan  ACE-inhibitor cough, Benign essential hypertension    · Losartan Potassium-HCTZ 50-12 5 MG Oral Tablet; TAKE 1 TABLET DAILY  Benign essential hypertension    · (1) TSH; Status:Active; Requested UFS:[id number];   Benign essential hypertension, Vitamin B12 deficiency, Vitamin D deficiency    · (1) COMPREHENSIVE METABOLIC PANEL; Status:Active; Requested ISJ:[id number];    · (1) VITAMIN B12; Status:Active; Requested RKA:35BPV1753;    · (1) VITAMIN D 25-HYDROXY; Status:Active; Requested LINO:[id number];   Depression, OCD (obsessive compulsive disorder)    · Venlafaxine HCl  MG Oral Tablet Extended Release 24 Hour; Take 1  tablet daily  Menopausal symptoms    · * DXA BONE DENSITY SPINE HIP AND PELVIS; Status:Active; Requested  for:01Mar2016; Thyroid disorder    · US THYROID; Status:Active; Requested for:01Mar2016;     Chief Complaint  PT IS HERE FOR A YEARLY PHYS  History of Present Illness  HM, Adult Female: The patient is being seen for a health maintenance evaluation  General Health:   Screening: Active Problems    1  ACE-inhibitor cough (786 2,E942 6) (R05,T46 4X5A)   2  Acute upper respiratory infection (465 9) (J06 9)   3  Allergic conjunctivitis (372 14) (H10 10)   4  Benign essential hypertension (401 1) (I10)   5  Depression (311) (F32 9)   6  Fatigue (780 79) (R53 83)   7  Influenza-like symptoms (780 99) (R68 89)   8  Irritable bowel syndrome (564 1) (K58 9)   9  Lipoma (214 9) (D17 9)   10  Lower back pain (724 2) (M54 5)   11  Neuropathy (355 9) (G62 9)   12  Nontoxic single thyroid nodule (241 0) (E04 1)   13  OCD (obsessive compulsive disorder) (300 3) (F42)   14  Sinus infection (473 9) (J32 9)   15  Sore throat (462) (J02 9)   16   Thyroid disorder (246 9) (E07 9)   17  Upper respiratory infection (465 9) (J06 9)   18  Vitamin B12 deficiency (266 2) (E53 8)   19  Vitamin D deficiency (268 9) (E55 9)    Past Medical History    · History of Abnormal weight gain (783 1) (R63 5)   · History of Cough (786 2) (R05)   · History of Dysfunction of left Eustachian tube (381 81) (H69 82)   · History of Dysfunction of right Eustachian tube (381 81) (H69 81)    Surgical History    · History of  Section   · History of Complete Colonoscopy   · History of Gallbladder Surgery   · History of Tonsillectomy With Adenoidectomy   · History of Tubal Ligation   · History of Vaginal Hysterectomy    Family History    · Family history of Hypertension, benign    · Family history of Benign Essential Hypertension   · Family history of Coronary Artery Stenosis    Social History    · Being A Social Drinker   · Former smoker (V15 82) (C77 568)   · Single    Current Meds   1  Losartan Potassium-HCTZ 50-12 5 MG Oral Tablet; TAKE 1 TABLET DAILY; Therapy: 67UQM8970 to (Marine Cypher)  Requested for: 53ZRX0127; Last   Rx:67Xxs3876 Ordered   2  Montelukast Sodium 10 MG Oral Tablet; TAKE 1 TABLET BY MOUTH DAILY; Therapy: 40IKJ5967 to (Last Rx:2016)  Requested for: 47XUB2521 Ordered   3  Multivitamins TABS; Therapy: (Recorded:01Yay2960) to Recorded   4  Venlafaxine HCl  MG Oral Tablet Extended Release 24 Hour; Take 1 tablet daily; Therapy: 29UGS3558 to (Evaluate:50Nmw6107)  Requested for: 46Nnq5676; Last   Rx:04Yvx9431 Ordered   5  Vitamin B-12 1000 MCG Oral Tablet; TAKE 1 TABLET DAILY AS DIRECTED; Therapy: 17PCF2564 to (Evaluate:2016) Recorded   6  Vitamin D-3 5000 UNIT TABS; Therapy: (Recorded:08Jmq8218) to Recorded    Allergies    1   ACE Inhibitors    Vitals   Recorded: 20BQR8248 12:34PM   Height 5 ft 2 5 in   Weight 204 lb 8 0 oz   BMI Calculated 36 81   BSA Calculated 1 94     Results/Data  COLONOSCOPY 2010 12:00AM      Test Name Result Flag Reference   Colonoscopy      Dr Zafar Beaver  rectal polyp       Future Appointments    Date/Time Provider Specialty Site   78/53/9437 06:76 PM Bill Rojas DO Family Medicine [facility]     Signatures   Electronically signed by : Woodrow Appiah DO; Mar  1 3772  5:23PM EST                       (Author)

## 2018-01-13 NOTE — PROGRESS NOTES
Chief Complaint  Chief Complaint Free Text Note Form: For nurse dressing change for RLE wound  History of Present Illness  Wound Identification HPI:   Wound Identification HPI   Wound #1: right lower leg        Visit Information:   The patient came to Wound Care and was ambulatory  The patient is being seen for follow-up with RN at the [address] [place] [address]  The patient is accompanied by her self  The patient's identification was verified  A secondary verification process was completed  Orientation: oriented to person, oriented to place and oriented to time  Blood Glucose:  Not applicable  6/13/16 Patient arrives for initial visit for assessment and treatment of Right lower leg laceration  Referred by Dr Tigist Mckinney  On 6/2/16, patient fell down cellar stairs and does not remember how she fell  Blood sugar taken during visit today 91mg/mL Patient did not eat breakfast  To return to PCP for follow up on "Passing out"  Wound sutured with surrounding skin hard and indurated,Patient complaining of pain when area palpated  Continues to work as a  with legs dependent all day  Pitting edema noted of RLE  Pedal pulses palpable  6/15/2016 Patient arrived with dressing intact  RLE appears to be edematous  Patient states that she is experiencing pain in her RLE  Cassidy wound redness decreased, right toes appear to still be ecchymotic    6-17-16-Patient arrived with dressing intact  11cm of AMD was removed from wound base today  Right toes still appear ecchymotic although are resolving  Patient states that she experiences"twinging" pain at times in her RLE  Ecchymosis remains on L lateral LE  6/22/2016 Patient arrived with dressing intact  Strike through drainage present on Tubigrip F  RLE still appears edematous         Emery Fall Scale:     History of Falling: Yes = 25   Secondary Diagnosis: Yes = 15   Ambulatory Aid None, Bedrest, Nurse Assist = 0   No = 0   Gait: Normal = 0   Mental Status: Oriented to own ability = 0   Total Score: 40    25 - 45 = Moderate Risk        Fall, Nutrition, Mobility, Neuropsychological Assessment: The most recent fall occurred denies any falls since last visit, 6/22/2016  The fall resulted from passed out  The fall was preceded by reports no warning symptoms of fall  Nutrition Assessment Screening: Food intake over the last 3 months due to the loss of appetite, digestive problems, chewing or swallowing difficulties is graded as: 2 = no decrease in food intake   Weight loss during the last 3 months: 3 = no weight loss   Mobility scored as: 2 = goes out  Psychological Stress and Acute Disease Scored as: 2 = The patient has not experienced psychological stress or acute disease in the last 3 months  Neuropsychological problems scored as: 2 = no psychological problems  Body Mass Index (BMI) scored as: 3 = BMI 23 or greater  Nutritional Assessment Screening Score: 8 - 11 points - At risk of malnutrition  Hospital Based Practices Required Assessment:   Pain Assessment   the patient states they do not have pain  The pain is located in the right lower extremity  The pain radiates to the denies  The patient describes the pain as Patient states that she experiences "Twinging"pain at times in her RLE  (on a scale of 0 to 10, the patient rates the pain at 0 )   Abuse And Domestic Violence Screen    Yes, the patient is safe at home  The patient states no one is hurting them  Depression And Suicide Screen  No, the patient has not had thoughts of hurting themself  No, the patient has not felt depressed in the past 7 days  Prefered Language is  [place]  Primary Language is  English  Readiness To Learn: Receptive  Barriers To Learning: none     Preferred Learning: demonstration and written   Education Completed: disease/condition, medications, treatment/procedure and equipment/supplies   Teaching Method: written and demonstration   Person Taught: patient   Evaluation Of Learning: verbalized/demonstrated understanding and needs reinforcement      Active Problems    1  ACE-inhibitor cough (786 2,E942 6) (R05,T46 4X5A)   2  Acute upper respiratory infection (465 9) (J06 9)   3  Allergic conjunctivitis (372 14) (H10 10)   4  Benign essential hypertension (401 1) (I10)   5  Depression (311) (F32 9)   6  Encounter for screening colonoscopy (V76 51) (Z12 11)   7  Fall, accidental (E888 9) (W19 XXXA)   8  Fatigue (780 79) (R53 83)   9  Hematoma of lower extremity, right, sequela (906 3) (S80 11XS)   10  Influenza-like symptoms (780 99) (R68 89)   11  Irritable bowel syndrome (564 1) (K58 9)   12  Laceration of right lower extremity, initial encounter (894 0) (S81 811A)   13  Lipoma (214 9) (D17 9)   14  Lower back pain (724 2) (M54 5)   15  Menopausal symptoms (627 2) (N95 1)   16  Neuropathy (355 9) (G62 9)   17  Nontoxic single thyroid nodule (241 0) (E04 1)   18  OCD (obsessive compulsive disorder) (300 3) (F42)   19  Open wound of right lower extremity (891 0) (S81 801A)   20  Sinus infection (473 9) (J32 9)   21  Sore throat (462) (J02 9)   22  Thyroid disorder (246 9) (E07 9)   23  Traumatic hematoma of right lower leg (924 10) (S80 11XA)   24  Upper respiratory infection (465 9) (J06 9)   25  Vitamin B12 deficiency (266 2) (E53 8)   26  Vitamin D deficiency (268 9) (E55 9)    Past Medical History    1  History of Abnormal weight gain (783 1) (R63 5)   2  History of Cough (786 2) (R05)   3  History of Dysfunction of left Eustachian tube (381 81) (H69 82)   4  History of Dysfunction of right Eustachian tube (381 81) (H69 81)    Surgical History    1  History of  Section   2  History of Complete Colonoscopy   3  History of Gallbladder Surgery   4  History of Tonsillectomy With Adenoidectomy   5  History of Tubal Ligation   6  History of Vaginal Hysterectomy    Family History    1  Family history of aortic stenosis (V17 49) (Z82 49)   2   Family history of heart murmur (V17 49) (Z84 89)   3  Family history of Hypertension, benign    4  Family history of Benign Essential Hypertension   5  Family history of Coronary Artery Stenosis    Social History    · Being A Social Drinker   · Former smoker (I54 60) (G16 051)   · Single    Current Meds   1  Cephalexin 500 MG Oral Capsule; TAKE 1 CAPSULE EVERY 6 HOURS DAILY; Therapy: 50VMU5764 to (Evaluate:20Jun2016)  Requested for: 60FZZ5276; Last   Rx:13Jun2016 Ordered   2  Losartan Potassium-HCTZ 50-12 5 MG Oral Tablet; TAKE 1 TABLET DAILY; Therapy: 31INW7231 to (Evaluate:24Feb2017)  Requested for: 73STQ1947; Last   Rx:01Mar2016 Ordered   3  Multivitamins TABS; Therapy: (Recorded:47Cvs8933) to Recorded   4  Venlafaxine HCl  MG Oral Tablet Extended Release 24 Hour; Take 1 tablet daily; Therapy: 94MAT6165 to (Evaluate:24Feb2017)  Requested for: 63GNP4047; Last   Rx:01Mar2016 Ordered   5  Vitamin B-12 1000 MCG Oral Tablet; TAKE 1 TABLET DAILY AS DIRECTED; Therapy: 43LQK7944 to (Evaluate:23Jan2016) Recorded   6  Vitamin D-3 5000 UNIT TABS; Therapy: (Recorded:61Rjt9050) to Recorded    Allergies    1  ACE Inhibitors    Physical Exam    Wound #1 Assessment wound #1 Location:, right lower leg, started on 6/2/16, Care for this wound started on 6/13/16  Wound Status: not healed  Length: 1 4cm x Width: 5cm x Depth: 1 2cm   Total: 7sq cm   Wound Volume: 8 4cm3       Undermining 2 2cm from 12 o'clock to 6 o'clock      Tissue type: Subcutaneous, Granulation, Slough and unable to visualize wound bed   Color of Wound: Yellow - 15% and Pink - 85%   Exudate Amount: Large   Exudate Type: Serosangiunous and Bloody drainage   Odor: None   Exudate Color: Yellow and bloody,brown   Wound Edges: Intact   Periwound Skin Condition: Intact, Erythematous, Edema           Procedure      Wound #1: RLE     Nurse Dressing Change:   Wound #wound one The wound located on the RLE  Wound care rendered as per Physician/Advanced Practitioner order/plan      Return to [facility] F/u on 2016 for nurse dressing change     Comments:      Wound Drsg  Orders/Instructions  Wound Identification Dressing Orders--Instructions:   Wound Identification and Instructions   Wound #1: right lower leg    Wound Care Instructions  Discussed with Patient/Caregiver  Dressing Type: AMD  Wash with mild soap and water, normal saline, wound cleanser  As specified, use: Irrigate with NSS  Apply specified dressing to wound base/bed  To periwound apply: Calmoseptine  If using packing in a tunnel, secure tail to skin with tape   Secondary dressing apply: Gauze, 4 x 4  ABD  Secure with: Natalie, tape  Dressing change frequency: Three times per week, at [facility]  Comments/Other: Ace wrap (4" x 10') base of toes to knee followed by Tubigrip F  Apply compression usin" Ace Wrap and Tubigrip F  Wound Goals  Wound Goals:   Healing Goals:   Good healing potential    Wound base will be 25%    Patient will achieve full wound closure and return to full ADLs       Impression  Wound [address]: Wound Nursing Care Plan   Impaired Tissue Integrity related to:   Knowledge Deficit Related To:   Risk for Infection related to open wound:   Pain related to disease process and/or wound treatment:   Imbalanced Nutrition, less than body requirements related to inadequate intake and/or disease process: inadequate protein intake   Risk for Fall related to criteria noted on Fall Risk Assessment:   Goals   Patient will achieve 100% epithelialization:  Patient will demonstrate and verbalize knowledge of their disease process and management:  Patient will verbalize signs and symptoms of infection and when to notify physician or [facility]:    Patient will demonstrate and verbalize infection control and preventative measures:  Patient will report adequate pain management:     Patient will verbalize knowledge of and demonstrate adherence to interventions to achieve optimal nutrition required for wound healing:  Patient will not experience a fall:  Patient will maintain or achieve adequate hydration: and nutrition  Wound Nursing Care Interventions:   Provide moist wound healing:  Evaluate current medication regime for medications which may slow/impede wound healing:  Teach patient and/or family about disease process and management methods:  Teach patient and/or family about infection control measures (gloving, hand sanitation, MDROs, disposal of soiled dressings, antibiotic therapy): Narinder Forrest Assess pain and patients pain tolerance level:  Evaluate effectiveness of pain management interventions:  Assess patient's nutrition on each wound care visit (including protein and fluid intake): Narinder Forrest Educate on diet and hydration required to enhance wound healing:  Teach patient and caregiver how to change wound dressing:  Complete Fall Risk Assessment upon admission to wound program and with change in condition/implement identified interventions:    Elevate legs above heart 30 minutes 3 times a day, walk 30 minutes daily, reduced sodium diet, diuretics as ordered, wear compression hose or wrap as ordered:     Other:  Care plan initiated 6/13/16      Future Appointments    Date/Time Provider Specialty Site   06/27/2016 01:15 PM Lan Webber MD General Surgery [facility]   07/05/2016 10:00 AM Tonja Da[address]alfredo[address]   03/39/6968 74:54 PM Elina Damico DO Family Medicine [facility]   06/24/2016 02:00 PM Wound Care [place], Nurse Schedule  [facility]     Signatures   Electronically signed by : Gretel Pinead RN; Jun 22 2016  3:42PM EST                       (Author)

## 2018-01-13 NOTE — RESULT NOTES
Verified Results  ECHO COMPLETE WITH CONTRAST IF INDICATED 2016 02:41PM Alex Nicholson     Test Name Result Flag Reference   ECHO COMPLETE WITH CONTRAST IF INDICATED (Report)     [facility]   [address]  [place], [address]   [phone]     Transthoracic Echocardiogram   2D, M-mode, Doppler, and Color Doppler     Study date: 02-Aug-2016     Patient: Mattie MARCUS   MR number: [id number]   Account number: [de-identified]   : 1955   Age: 62 years   Gender: Female   Status: Outpatient   Location: [address] [place] [address] [facility]   Height: 63 in   Weight: 191 6 lb   BP: 122/ 76 mmHg     Indications: Hypertension     Diagnoses: I10  - Essential (primary) hypertension     Sonographer: PATRICA Fenton   Primary Physician: Joe Sebastian DO   Referring Physician: Scott Silvestre MD   Group: [facility]   Interpreting Physician: Scott Silvestre MD     SUMMARY     LEFT VENTRICLE:   Systolic function was normal  Ejection fraction was estimated in the range of   60 % to 65 %  There were no regional wall motion abnormalities  Wall thickness was mildly increased  MITRAL VALVE:   There was mild annular calcification  HISTORY: PRIOR HISTORY: Syncope, hypertension, former smoker, obesity, chest   pain     PROCEDURE: The study was performed in the [address] [place] [address]  This   was a routine study  The transthoracic approach was used  The study included   complete 2D imaging, M-mode, complete spectral Doppler, and color Doppler  The   heart rate was 75 bpm, at the start of the study  Images were obtained from the   parasternal, apical, subcostal, and suprasternal notch acoustic windows  Echocardiographic views were limited due to decreased penetration and lung   interference  This was a technically difficult study       LEFT VENTRICLE: Size was normal  Systolic function was normal  Ejection   fraction was estimated in the range of 60 % to 65 %  There were no regional   wall motion abnormalities  Wall thickness was mildly increased  DOPPLER: There   was an increased relative contribution of atrial contraction to ventricular   filling  The deceleration time of the early transmitral flow velocity was   normal      RIGHT VENTRICLE: The size was normal  Systolic function was normal  Wall   thickness was normal      LEFT ATRIUM: Size was at the upper limits of normal      RIGHT ATRIUM: Size was normal      MITRAL VALVE: There was mild annular calcification  DOPPLER: There was no   evidence for stenosis  There was no regurgitation  AORTIC VALVE: The valve was trileaflet  Leaflets exhibited normal thickness and   normal cuspal separation  DOPPLER: Transaortic velocity was within the normal   range  There was no evidence for stenosis  There was no regurgitation  TRICUSPID VALVE: The valve structure was normal  There was normal leaflet   separation  DOPPLER: The transtricuspid velocity was within the normal range  There was no evidence for stenosis  There was no regurgitation  PULMONIC VALVE: Leaflets exhibited normal thickness, no calcification, and   normal cuspal separation  DOPPLER: The transpulmonic velocity was within the   normal range  There was no regurgitation  PERICARDIUM: There was no pericardial effusion  The pericardium was normal in   appearance  AORTA: The root exhibited normal size  SYSTEMIC VEINS: IVC: The inferior vena cava was normal in size and course     Respirophasic changes were normal      SYSTEM MEASUREMENT TABLES     2D   Ao Diam: 3 5 cm   IVSd: 1 1 cm   LA Diam: 4 cm   LVIDd: 4 6 cm   LVIDs: 2 8 cm   LVPWd: 1 1 cm     PW   E': 0 2 m/s   E/E': 4 8   MV A Rodrick: 0 9 m/s   MV Dec [place]: 3 9 m/s2   MV DecT: 192 3 ms   MV E Rodrick: 0 7 m/s   MV E/A Ratio: 0 9     [employer] Accredited Echocardiography Laboratory     Prepared and electronically signed by     Brittni Moreno MD   Signed [id number] Aminah

## 2018-01-13 NOTE — MISCELLANEOUS
Physical Exam    Wound #1 Assessment wound #1 Location:, right lower leg (multi), started on 6/2/16, Care for this wound started on 6/13/16  Wound Status: not healed  Length: 0 4cm x Width: 1 9cm x Depth: 0 4cm   Total: 0 76sq cm   Wound Volume: 0 304cm3   Tunneling 0 8cm at 3 o'clock          Tissue type: Subcutaneous, Granulation, Hypergranulation, Slough and Hypergranular on lateral edge   Color of Wound: Red - 80% and Yellow - 20%   Exudate Amount: Minimal   Exudate Type: Serosangiunous   Odor: None   Exudate Color: brown   Wound Edges: Intact and Rolled (epibolized)   Periwound Skin Condition: Intact, lower leg edema    Wound Drsg  Orders/Instructions  Wound Identification Dressing Orders--Instructions:   Wound Identification and Instructions   Wound #1: right lower extremity multi wound  Wound Care Instructions  Discussed with Patient/Caregiver  Dressing Type: Acticoat 3  Wash with mild soap and water, normal saline, wound cleanser  As specified, use: NSS  Apply specified dressing to wound base/bed  To periwound apply: Calmoseptine  Secondary dressing apply: Gauze, 4x4,5x9  Secure with: Kerlix, tape  Dressing change frequency: Three times per week, at [facility]  Comments/Other:   Acticoat 3 in tunnel and Puracol on superficial areas of wound, 4x10 inch ace and Tubigrip F  Apply compression using: Tubigrip F  Future Appointments    Date/Time Provider Specialty Site   07/22/2016 08:30 AM Johanna Kaminski MD General Surgery [facility]   07/26/2016 11:00 AM Shira Carrel, M D  Cardiology  CARDIOLOGY  [facility]   53/14/8776 04:28 PM Favian Yadav DO [facility]   07/15/2016 02:00 PM Wound Care [place], Nurse Schedule   [facility] [address] Plan  Wound Nursing Care Plan [facility]:    Wound Nursing Care Plan   Impaired Tissue Integrity related to:   Knowledge Deficit Related To:   Risk for Infection related to open wound:   Pain related to disease process and/or wound treatment:   Imbalanced Nutrition, less than body requirements related to inadequate intake and/or disease process: inadequate protein intake   Risk for Fall related to criteria noted on Fall Risk Assessment:   Goals   Patient will achieve 100% epithelialization:  Patient will demonstrate and verbalize knowledge of their disease process and management:  Patient will verbalize signs and symptoms of infection and when to notify physician or [facility]:    Patient will demonstrate and verbalize infection control and preventative measures:  Patient will report adequate pain management:  Patient will verbalize knowledge of and demonstrate adherence to interventions to achieve optimal nutrition required for wound healing:  Patient will not experience a fall:  Patient will maintain or achieve adequate hydration: and nutrition  Wound Nursing Care Interventions:   Provide moist wound healing:  Evaluate current medication regime for medications which may slow/impede wound healing:  Teach patient and/or family about disease process and management methods:  Teach patient and/or family about infection control measures (gloving, hand sanitation, MDROs, disposal of soiled dressings, antibiotic therapy): Laramie Organ Assess pain and patients pain tolerance level:  Evaluate effectiveness of pain management interventions:  Assess patient's nutrition on each wound care visit (including protein and fluid intake): Laramie Organ Educate on diet and hydration required to enhance wound healing:  Teach patient and caregiver how to change wound dressing:  Complete Fall Risk Assessment upon admission to wound program and with change in condition/implement identified interventions:    Elevate legs above heart 30 minutes 3 times a day, walk 30 minutes daily, reduced sodium diet, diuretics as ordered, wear compression hose or wrap as ordered:     Other:  Care plan initiated 6/13/16      Signatures Electronically signed by : Arron Henderson RN; Jul 13 2016  1:51PM EST                       (Author)

## 2018-01-13 NOTE — RESULT NOTES
Message   CT brain showed no acute disease, incidental finding of some bifrontal volume loss  Rec  also with f-up with [facility]'s Neurology for syncopal episodes to r/o any seizure activity with her syncopal episodes and also incidental finding of some bifrontal volume loss seen on CT scan of brain  Overall no acute disease  No tumors or masses or bleeding seen  Verified Results  * CT HEAD WO CONTRAST [id number] 01:28PM Nokomis Ada     Test Name Result Flag Reference   CT HEAD WO CONTRAST (Report)     CT BRAIN - WITHOUT CONTRAST     INDICATION: Clouding of consciousness, patient claims to have lost consciousness twice with falls, but denying head trauma     COMPARISON: CT sinus 1/22/2016     TECHNIQUE: CT examination of the brain was performed  In addition to axial images, coronal reformatted images were created and submitted for interpretation  Examination was performed utilizing techniques to minimize radiation dose, including the use    of dose reduction software  IMAGE QUALITY: Diagnostic  FINDINGS:      PARENCHYMA: No acute disease  No acute ischemia, intracranial mass, mass effect, edema or hemorrhage  Precocious bifrontal volume loss with prominence of the subarachnoid spaces in the anterior fossa  VENTRICLES AND EXTRA-AXIAL SPACES: Bifrontal volume loss     VISUALIZED ORBITS AND PARANASAL SINUSES: Unremarkable  CALVARIUM AND EXTRACRANIAL SOFT TISSUES:  Normal        IMPRESSION:     No acute disease  Precocious Bifrontal volume loss  Workstation performed: [id number]     Signed by:    Elier Pabon MD   7/7/16

## 2018-01-14 VITALS
HEIGHT: 63 IN | TEMPERATURE: 100.5 F | BODY MASS INDEX: 39.69 KG/M2 | DIASTOLIC BLOOD PRESSURE: 82 MMHG | WEIGHT: 224 LBS | SYSTOLIC BLOOD PRESSURE: 148 MMHG

## 2018-01-15 VITALS
HEIGHT: 63 IN | WEIGHT: 217.56 LBS | DIASTOLIC BLOOD PRESSURE: 72 MMHG | SYSTOLIC BLOOD PRESSURE: 122 MMHG | BODY MASS INDEX: 38.55 KG/M2

## 2018-01-15 NOTE — PROGRESS NOTES
Chief Complaint  Chief Complaint Free Text Note Form: Nurse dressing change for RLE, multi wound  History of Present Illness  Wound Identification HPI:   Wound Identification HPI   Wound #1: right lower leg (multi)        Visit Information:   The patient came to Wound Care and was ambulatory  The patient is being seen for a follow-up with MD at the [address] [place] [address]  The patient is accompanied by her self  The patient's identification was verified  A secondary verification process was completed  Orientation: oriented to person, oriented to place and oriented to time  Blood Glucose:  Not applicable  6/13/16 Patient arrives for initial visit for assessment and treatment of Right lower leg laceration  Referred by Dr Tigist Mckinney  On 6/2/16, patient fell down cellar stairs and does not remember how she fell  Blood sugar taken during visit today 91mg/mL Patient did not eat breakfast  To return to PCP for follow up on "Passing out"  Wound sutured with surrounding skin hard and indurated,Patient complaining of pain when area palpated  Continues to work as a  with legs dependent all day  Pitting edema noted of RLE  Pedal pulses palpable  6/15/2016 Patient arrived with dressing intact  RLE appears to be edematous  Patient states that she is experiencing pain in her RLE  Cassidy wound redness decreased, right toes appear to still be ecchymotic    6-17-16-Patient arrived with dressing intact  11cm of AMD was removed from wound base today  Right toes still appear ecchymotic although are resolving  Patient states that she experiences"twinging" pain at times in her RLE  Ecchymosis remains on L lateral LE  6/22/2016 Patient arrived with dressing intact  Strike through drainage present on Tubigrip F  RLE still appears edematous  6/24/2016 Patient arrived with dressing, 4x10 inch ace and Tubigrip F intact  This morning patient states that her dressing did get wet through the cast cover   Patient did finish Cephalexin  6/29/16 Patient arrives with compression and dressings intact on RLE  optifoam has placed pressure on the distal wound edges and there is less edema  7/1/16 arrived with dressing, ace wrap and tubigrip intact to RLE  Offers no complaints and reports she is "better" today  She reports she has been taking Amoxicillin for a dental problem since Tuesday 6/29 7-5-16 arrived today with dressing contact to RLE  Reports dressing "got wet" this AM in the shower so she took the kerlix off  Remaining dressing dry upon arrival  Skin bridge is present in wound converting wound to multi (2) wound)  Reports is seeing PCP today after this visit regarding falls  7/8/2016 Patient arrived with dressing intact  Patient had an CT-Scan of her head on 7/6/2016 and chest x-ray to help r/o while patient fell 5 weeks ago  7/11/2016 Patient arrived with dressing intact  Patient states that she prefers to wear the 4x10 inch ace  Patient reports that her RLE feels better with the ace applied and it also helps keep the dressing intact  7/13/2016: Arrived with dressing intact  Lower leg edema improving  Emery Fall Scale:     History of Falling: Yes = 25   Secondary Diagnosis: Yes = 15   Ambulatory Aid None, Bedrest, Nurse Assist = 0   No = 0   Gait: Normal = 0   Mental Status: Oriented to own ability = 0   Total Score: 40    25 - 45 = Moderate Risk        Fall, Nutrition, Mobility, Neuropsychological Assessment: The most recent fall occurred denies any falls since last visit, 7/11/2016  The fall resulted from passed out  The fall was preceded by reports no warning symptoms of fall  Nutrition Assessment Screening: Food intake over the last 3 months due to the loss of appetite, digestive problems, chewing or swallowing difficulties is graded as: 2 = no decrease in food intake   Weight loss during the last 3 months: 3 = no weight loss   Mobility scored as: 2 = goes out     Psychological Stress and Acute Disease Scored as: 2 = The patient has not experienced psychological stress or acute disease in the last 3 months  Neuropsychological problems scored as: 2 = no psychological problems  Body Mass Index (BMI) scored as: 3 = BMI 23 or greater  Nutritional Assessment Screening Score: 8 - 11 points - At risk of malnutrition  Hospital Based Practices Required Assessment:   Pain Assessment   the patient states they do not have pain  The pain is located in the denies  The pain radiates to the denies  The patient describes the pain as  (on a scale of 0 to 10, the patient rates the pain at 0 )   Abuse And Domestic Violence Screen    Yes, the patient is safe at home  The patient states no one is hurting them  Depression And Suicide Screen  No, the patient has not had thoughts of hurting themself  No, the patient has not felt depressed in the past 7 days  Prefered Language is  [place]  Primary Language is  English  Readiness To Learn: Receptive  Barriers To Learning: none  Preferred Learning: demonstration and written   Education Completed: disease/condition, medications, treatment/procedure and equipment/supplies   Teaching Method: written and demonstration   Person Taught: patient   Evaluation Of Learning: verbalized/demonstrated understanding and needs reinforcement      Active Problems    1  ACE-inhibitor cough (786 2,E942 6) (R05,T46 4X5A)   2  Acute upper respiratory infection (465 9) (J06 9)   3  Allergic conjunctivitis (372 14) (H10 10)   4  Benign essential hypertension (401 1) (I10)   5  Change in mental status (780 97) (R41 82)   6  Chest pain (786 50) (R07 9)   7  Depression (311) (F32 9)   8  Encounter for screening colonoscopy (V76 51) (Z12 11)   9  Fall, accidental (E888 9) (W19 XXXA)   10  Fatigue (780 79) (R53 83)   11  Hematoma of lower extremity, right, sequela (906 3) (S80 11XS)   12  Influenza-like symptoms (780 99) (R68 89)   13  Irritable bowel syndrome (564 1) (K58 9)   14   Laceration of right lower extremity, initial encounter (894 0) (S81 811A)   15  Laceration of right lower leg with complication, sequela (582 2) (S81 811S)   16  Lipoma (214 9) (D17 9)   17  Lower back pain (724 2) (M54 5)   18  Menopausal symptoms (627 2) (N95 1)   19  Neuropathy (355 9) (G62 9)   20  Non-healing surgical wound, initial encounter (998 83) (T81 89XA)   21  Nontoxic single thyroid nodule (241 0) (E04 1)   22  Numbness and tingling of right arm (782 0) (R20 0,R20 2)   23  OCD (obsessive compulsive disorder) (300 3) (F42)   24  Open wound of right lower extremity (891 0) (S81 801A)   25  Sinus infection (473 9) (J32 9)   26  Sore throat (462) (J02 9)   27  Syncope (780 2) (R55)   28  Thyroid disorder (246 9) (E07 9)   29  Traumatic hematoma of right lower leg (924 10) (S80 11XA)   30  Upper respiratory infection (465 9) (J06 9)   31  Vitamin B12 deficiency (266 2) (E53 8)   32  Vitamin D deficiency (268 9) (E55 9)    Past Medical History    1  History of Abnormal weight gain (783 1) (R63 5)   2  History of Cough (786 2) (R05)   3  History of Dysfunction of left Eustachian tube (381 81) (H69 82)   4  History of Dysfunction of right Eustachian tube (381 81) (H69 81)    Surgical History    1  History of  Section   2  History of Complete Colonoscopy   3  History of Gallbladder Surgery   4  History of Tonsillectomy With Adenoidectomy   5  History of Tubal Ligation   6  History of Vaginal Hysterectomy    Family History    1  Family history of aortic stenosis (V17 49) (Z82 49)   2  Family history of heart murmur (V17 49) (Z84 89)   3  Family history of Hypertension, benign    4  Family history of Benign Essential Hypertension   5  Family history of Coronary Artery Stenosis    Social History    · Being A Social Drinker   · Former smoker (R63 12) (I34 896)   · Single    Current Meds   1  Losartan Potassium-HCTZ 50-12 5 MG Oral Tablet; TAKE 1 TABLET DAILY;    Therapy: 10CQK4146 to (Evaluate:25Rsq1313)  Requested for: 36JGA6259; Last   Rx:01Mar2016 Ordered   2  Multivitamins TABS; Therapy: (Recorded:48Nti9556) to Recorded   3  Venlafaxine HCl  MG Oral Tablet Extended Release 24 Hour; Take 1 tablet daily; Therapy: 17TPQ4077 to (Evaluate:24Feb2017)  Requested for: 13QZG4518; Last   Rx:01Mar2016 Ordered   4  Vitamin B-12 1000 MCG Oral Tablet; TAKE 1 TABLET DAILY AS DIRECTED; Therapy: 60XIL5392 to (Evaluate:23Jan2016) Recorded   5  Vitamin D-3 5000 UNIT TABS; Therapy: (Recorded:39Bzi1404) to Recorded    Allergies    1  ACE Inhibitors    Vitals  Signs [Data Includes: Current Encounter]   Recorded: 80RKS6708 01:48PM   Temperature: 96 7 F, Tympanic  Heart Rate: 72  Respiration: 18  Systolic: 462  Diastolic: 72  Height: 5 ft 3 in  Weight: 195 lb   BMI Calculated: 34 54  BSA Calculated: 1 91  Pain Scale: 0    Physical Exam    Wound #1 Assessment wound #1 Location:, right lower leg (multi), started on 6/2/16, Care for this wound started on 6/13/16  Wound Status: not healed  Length: 0 4cm x Width: 1 9cm x Depth: 0 4cm   Total: 0 76sq cm   Wound Volume: 0 304cm3   Tunneling 0 8cm at 3 o'clock          Tissue type: Subcutaneous, Granulation, Hypergranulation, Slough and Hypergranular on lateral edge   Color of Wound: Red - 80% and Yellow - 20%   Exudate Amount: Minimal   Exudate Type: Serosangiunous   Odor: None   Exudate Color: brown   Wound Edges: Intact and Rolled (epibolized)   Periwound Skin Condition: Intact, lower leg edema    Procedure      Wound #1: right lower extremity multi wound  Nurse Dressing Change:   Wound #1 The wound located on the right lower extremity multi wound  Wound care rendered as per Physician/Advanced Practitioner order/plan  Return to [address] [place] [address] 2 days for nurse dressing change  Comments:      Wound Drsg  Orders/Instructions  Wound Identification Dressing Orders--Instructions:   Wound Identification and Instructions   Wound #1: right lower extremity multi wound      Wound Care Instructions  Discussed with Patient/Caregiver  Dressing Type: Acticoat 3  Wash with mild soap and water, normal saline, wound cleanser  As specified, use: NSS  Apply specified dressing to wound base/bed  To periwound apply: Calmoseptine  Secondary dressing apply: Gauze, 4x4,5x9  Secure with: Kerlix, tape  Dressing change frequency: Three times per week, at [facility]  Comments/Other:   Acticoat 3 in tunnel and Puracol on superficial areas of wound, 4x10 inch ace and Tubigrip F  Apply compression using: Tubigrip F  Wound Goals  Wound Goals:   Healing Goals:   Good healing potential    Wound base will be 25%    Patient will achieve full wound closure and return to full ADLs       Impression  Wound [address]: Wound Nursing Care Plan   Impaired Tissue Integrity related to:   Knowledge Deficit Related To:   Risk for Infection related to open wound:   Pain related to disease process and/or wound treatment:   Imbalanced Nutrition, less than body requirements related to inadequate intake and/or disease process: inadequate protein intake   Risk for Fall related to criteria noted on Fall Risk Assessment:   Goals   Patient will achieve 100% epithelialization:  Patient will demonstrate and verbalize knowledge of their disease process and management:  Patient will verbalize signs and symptoms of infection and when to notify physician or [facility]:    Patient will demonstrate and verbalize infection control and preventative measures:  Patient will report adequate pain management:  Patient will verbalize knowledge of and demonstrate adherence to interventions to achieve optimal nutrition required for wound healing:  Patient will not experience a fall:  Patient will maintain or achieve adequate hydration: and nutrition  Wound Nursing Care Interventions:   Provide moist wound healing:  Evaluate current medication regime for medications which may slow/impede wound healing:     Teach patient and/or family about disease process and management methods:  Teach patient and/or family about infection control measures (gloving, hand sanitation, MDROs, disposal of soiled dressings, antibiotic therapy): Mita Hurd Assess pain and patients pain tolerance level:  Evaluate effectiveness of pain management interventions:  Assess patient's nutrition on each wound care visit (including protein and fluid intake): Mita Hurd Educate on diet and hydration required to enhance wound healing:  Teach patient and caregiver how to change wound dressing:  Complete Fall Risk Assessment upon admission to wound program and with change in condition/implement identified interventions:    Elevate legs above heart 30 minutes 3 times a day, walk 30 minutes daily, reduced sodium diet, diuretics as ordered, wear compression hose or wrap as ordered:  Other:  Care plan initiated 6/13/16      Future Appointments    Date/Time Provider Specialty Site   07/22/2016 08:30 AM Concha Almaguer MD General Surgery [facility]   07/26/2016 11:00 AM MARIANA Gunter   Cardiology  CARDIOLOGY  [facility]   74/41/3920 58:39 PM Harry Harris DO Family Medicine [facility]   07/15/2016 02:00 PM Wound Care [place], Nurse Schedule  [facility]     Signatures   Electronically signed by : Warren Neal RN; Jul 13 2016  1:51PM EST                       (Author)

## 2018-01-15 NOTE — MISCELLANEOUS
Physical Exam    Wound #1 Assessment wound #1 Location:, right lower leg, started on 6/2/16, Care for this wound started on 6/13/16  Wound Status: not healed  Length: 0 7cm x Width: 4cm x Depth: 0 7cm   Total: 2 8sq cm   Wound Volume: 1 96cm3       Undermining 2 2cm from 12 o'clock to 6 o'clock      Tissue type: Subcutaneous, Granulation, Slough and unable to visualize wound bed   Color of Wound: Red - 90% and Yellow - 10%   Exudate Amount: Large   Exudate Type: Serosangiunous   Odor: None   Exudate Color: Yellow and bloody,brown   Wound Edges: Intact   Periwound Skin Condition: Intact, Erythematous, Edema         Wound Drsg  Orders/Instructions  Wound Identification Dressing Orders--Instructions:   Wound Identification and Instructions   Wound #1: right lower leg    Wound Care Instructions  Discussed with Patient/Caregiver  Dressing Type: Drawtex (Hydroconductive)  Wash with mild soap and water, normal saline, wound cleanser  As specified, use: Irrigate with NSS  Apply specified dressing to wound base/bed  To periwound apply: Calmoseptine  If using packing in a tunnel, secure tail to skin with tape   Secondary dressing apply: Gauze, 4 x 4  ABD  Secure with: Kerlix, tape, 4x10 inch ace  Dressing change frequency: Three times per week, at [facility]  Comments/Other: Ace wrap (4" x 10') base of toes to knee followed by Tubigrip F  puracol on superficial areas  Apply compression using: Tubigrip F        Future Appointments    Date/Time Provider Specialty Site   07/08/2016 08:30 AM Alexia Jones MD General Surgery [facility]   07/05/2016 10:00 AM Yvette Galloway, [address] [place] [address]   46/47/1478 41:59 PM Liana Lara [address] [place] [address]   07/01/2016 01:00 PM [address]   07/05/2016 08:30 AM Wound Care [place], Nurse Schedule  [facility]   07/06/2016 01:00 PM Wound Care [place], Nurse Schedule   [facility] Plan  Wound Nursing Care Plan [address] [place] [address] [address]: Wound Nursing Care Plan   Impaired Tissue Integrity related to:   Knowledge Deficit Related To:   Risk for Infection related to open wound:   Pain related to disease process and/or wound treatment:   Imbalanced Nutrition, less than body requirements related to inadequate intake and/or disease process: inadequate protein intake   Risk for Fall related to criteria noted on Fall Risk Assessment:   Goals   Patient will achieve 100% epithelialization:  Patient will demonstrate and verbalize knowledge of their disease process and management:  Patient will verbalize signs and symptoms of infection and when to notify physician or [facility]:    Patient will demonstrate and verbalize infection control and preventative measures:  Patient will report adequate pain management:  Patient will verbalize knowledge of and demonstrate adherence to interventions to achieve optimal nutrition required for wound healing:  Patient will not experience a fall:  Patient will maintain or achieve adequate hydration: and nutrition  Wound Nursing Care Interventions:   Provide moist wound healing:  Evaluate current medication regime for medications which may slow/impede wound healing:  Teach patient and/or family about disease process and management methods:  Teach patient and/or family about infection control measures (gloving, hand sanitation, MDROs, disposal of soiled dressings, antibiotic therapy): Ashleigh Seward Assess pain and patients pain tolerance level:  Evaluate effectiveness of pain management interventions:  Assess patient's nutrition on each wound care visit (including protein and fluid intake): Ashleigh Seward Educate on diet and hydration required to enhance wound healing:  Teach patient and caregiver how to change wound dressing:     Complete Fall Risk Assessment upon admission to wound program and with change in condition/implement identified interventions:    Elevate legs above heart 30 minutes 3 times a day, walk 30 minutes daily, reduced sodium diet, diuretics as ordered, wear compression hose or wrap as ordered:     Other:  Care plan initiated 6/13/16      Signatures   Electronically signed by : Wilver Smalls RN; Jun 29 2016  4:01PM EST                       (Author)

## 2018-01-15 NOTE — RESULT NOTES
Message   all labs wnl  Verified Results  (1) COMPREHENSIVE METABOLIC PANEL [id number] 87:79GZ RFIDease Blanca     Test Name Result Flag Reference   GLUCOSE 86 mg/dL  65-99   Fasting reference interval   UREA NITROGEN (BUN) 16 mg/dL  7-25   CREATININE 0 73 mg/dL  0 50-0 99   For patients >52years of age, the reference limit  for Creatinine is approximately 13% higher for people  identified as -American  eGFR NON-AFR   AMERICAN 90 mL/min/1 73m2  > OR = 60   eGFR AFRICAN AMERICAN 104 mL/min/1 73m2  > OR = 60   BUN/CREATININE RATIO   9-79   NOT APPLICABLE (calc)   SODIUM 137 mmol/L  135-146   POTASSIUM 4 0 mmol/L  3 5-5 3   CHLORIDE 100 mmol/L     CARBON DIOXIDE 27 mmol/L  19-30   CALCIUM 9 3 mg/dL  8 6-10 4   PROTEIN, TOTAL 6 7 g/dL  6 1-8 1   ALBUMIN 4 2 g/dL  3 6-5 1   GLOBULIN 2 5 g/dL (calc)  1 9-3 7   ALBUMIN/GLOBULIN RATIO 1 7 (calc)  1 0-2 5   BILIRUBIN, TOTAL 0 7 mg/dL  0 2-1 2   ALKALINE PHOSPHATASE 66 U/L     AST 20 U/L  10-35   ALT 22 U/L  6-29     (1) CBC/PLT/DIFF [id number] 07:12AM RFIDease Blanca     Test Name Result Flag Reference   WHITE BLOOD CELL COUNT 5 8 Thousand/uL  3 8-10 8   RED BLOOD CELL COUNT 4 78 Million/uL  3 80-5 10   HEMOGLOBIN 13 2 g/dL  11 7-15 5   HEMATOCRIT 41 0 %  35 0-45 0   MCV 85 8 fL  80 0-100 0   MCH 27 7 pg  27 0-33 0   MCHC 32 3 g/dL  32 0-36 0   RDW 14 1 %  11 0-15 0   PLATELET COUNT 843 Thousand/uL  140-400   MPV 11 3 fL  7 5-11 5   ABSOLUTE NEUTROPHILS 3677 cells/uL  7727-2240   ABSOLUTE LYMPHOCYTES 1734 cells/uL  850-3900   ABSOLUTE MONOCYTES 273 cells/uL  200-950   ABSOLUTE EOSINOPHILS 104 cells/uL     ABSOLUTE BASOPHILS 12 cells/uL  0-200   NEUTROPHILS 63 4 %     LYMPHOCYTES 29 9 %     MONOCYTES 4 7 %     EOSINOPHILS 1 8 %     BASOPHILS 0 2 %       (1) T4, FREE [id number] 07:12AM Limmie Blanca     Test Name Result Flag Reference   T4, FREE 1 1 ng/dL  0 8-1 8     (1) IRON [id number] 07:12AM Latia Rios     Test Name Result Flag Reference   IRON, TOTAL 97 mcg/dL       (Q) LIPID PANEL WITH REFLEX TO DIRECT LDL [id number] 07:12AM Reyna Gentleman     Test Name Result Flag Reference   CHOLESTEROL, TOTAL 179 mg/dL  125-200   HDL CHOLESTEROL 49 mg/dL  > OR = 46   TRIGLICERIDES 062 mg/dL  <150   LDL-CHOLESTEROL 103 mg/dL (calc)  <130   Desirable range <100 mg/dL for patients with CHD or  diabetes and <70 mg/dL for diabetic patients with  known heart disease  CHOL/HDLC RATIO 3 7 (calc)  < OR = 5 0   NON HDL CHOLESTEROL 130 mg/dL (calc)     Target for non-HDL cholesterol is 30 mg/dL higher than   LDL cholesterol target  (1) OP PRANAY FERRITIN [id number] 07:12AM Reyna Gentleman     Test Name Result Flag Reference   FERRITIN 30 ng/mL       (Q) TSH, 3RD GENERATION [id number] 07:12AM Reyna Gentleman     Test Name Result Flag Reference   TSH 1 29 mIU/L  0 40-4 50     (Q) VITAMIN B12/FOLATE, SERUM PANEL [id number] 07:12AM Reyna Gentleman     Test Name Result Flag Reference   VITAMIN B12 700 pg/mL  200-1100   FOLATE, SERUM >24 0 ng/mL     Reference Range                             Low:           <3 4                             Borderline:    3 4-5 4                             Normal:        >5 4     *(Q) VITAMIN D, 25-HYDROXY, LC/MS/MS [id number] 07:12AM Reyna Gentleman     Test Name Result Flag Reference   VITAMIN D, 25-OH, TOTAL 48 ng/mL     Vitamin D Status         25-OH Vitamin D:     Deficiency:                    <20 ng/mL  Insufficiency:             20 - 29 ng/mL  Optimal:                 > or = 30 ng/mL     For 25-OH Vitamin D testing on patients on   D2-supplementation and patients for whom quantitation   of D2 and D3 fractions is required, the QuestAssureD(TM)  25-OH VIT D, (D2,D3), LC/MS/MS is recommended: order   code 51897 (patients >2yrs)  For more information on this test, go to:  http://[employer]/faq/UCJ613  (This link is being provided for   informational/educational purposes only ) (Q) HEMOGLOBIN A1c [id number] 07:12AM Kulwinder Alejandro   REPORT COMMENT:  LAB REQ [id number]  FASTING:YES     Test Name Result Flag Reference   HEMOGLOBIN A1c 5 0 % of total Hgb  <5 7   According to ADA guidelines, hemoglobin A1c <7 0%  represents optimal control in non-pregnant diabetic  patients  Different metrics may apply to specific  patient populations  Standards of Medical Care in    Diabetes Care  2013;36:s11-s66     For the purpose of screening for the presence of  diabetes  <5 7%       Consistent with the absence of diabetes  5 7-6 4%    Consistent with increased risk for diabetes              (prediabetes)  >or=6 5%    Consistent with diabetes     This assay result is consistent with a decreased risk  of diabetes  Currently, no consensus exists for use of hemoglobin  A1c for diagnosis of diabetes for children         Plan  Syncope    · *1 - [facility] Physician Referral  Consult  Status: Active  Requested for:  [id number]  Care Summary provided  : Yes

## 2018-01-15 NOTE — MISCELLANEOUS
Physical Exam    Wound #1 Assessment wound #1 Location:, right lower leg (multi), started on 6/2/16, Care for this wound started on 6/13/16  Wound Status: not healed  Length: 0 2cm x Width: 1cm x Depth: 0 3cm   Total: 0 2sq cm   Wound Volume: 0 06cm3   Tunneling 0 8cm at 3 o'clock          Tissue type: Subcutaneous, Granulation, Hypergranulation, Slough and Hypergranular on lateral edge   Color of Wound: Red - 90% and Yellow - 10%   Exudate Amount: Scant   Exudate Type: Serosangiunous   Odor: None   Exudate Color: brown   Wound Edges: Intact and Rolled (epibolized)   Periwound Skin Condition: Intact, lower leg edema           Wound Drsg  Orders/Instructions  Wound Identification Dressing Orders--Instructions:   Wound Identification and Instructions   Wound #1: right lower extremity multi wound  Wound Care Instructions  Discussed with Patient/Caregiver  Dressing Type: Acticoat 3  Wash with mild soap and water, normal saline, wound cleanser  As specified, use: NSS  Apply specified dressing to wound base/bed  To periwound apply: Calmoseptine  Secondary dressing apply: Gauze, 4x4,5x9  Secure with: Kerlix, tape  Dressing change frequency: Three times per week, at [facility]  Comments/Other:   Acticoat 3 in tunnel and Puracol on superficial areas of wound, 4x10 inch ace and Tubigrip F  Apply compression using: Tubigrip F  Future Appointments    Date/Time Provider Specialty Site   07/22/2016 08:30 AM Mario Arellano MD General Surgery [facility]   07/26/2016 11:00 AM MARIANA Blackman  Cardiology  CARDIOLOGY  [place]   27/74/7268 62:97 PM Frank Florez,  Family Medicine [facility]     [address] Plan  Wound Nursing Care Plan [facility]:    Wound Nursing Care Plan   Impaired Tissue Integrity related to:   Knowledge Deficit Related To:   Risk for Infection related to open wound:   Pain related to disease process and/or wound treatment:   Imbalanced Nutrition, less than body requirements related to inadequate intake and/or disease process: inadequate protein intake   Risk for Fall related to criteria noted on Fall Risk Assessment:   Goals   Patient will achieve 100% epithelialization:  Patient will demonstrate and verbalize knowledge of their disease process and management:  Patient will verbalize signs and symptoms of infection and when to notify physician or FIELD [facility]:    Patient will demonstrate and verbalize infection control and preventative measures:  Patient will report adequate pain management:  Patient will verbalize knowledge of and demonstrate adherence to interventions to achieve optimal nutrition required for wound healing:  Patient will not experience a fall:  Patient will maintain or achieve adequate hydration: and nutrition  Wound Nursing Care Interventions:   Provide moist wound healing:  Evaluate current medication regime for medications which may slow/impede wound healing:  Teach patient and/or family about disease process and management methods:  Teach patient and/or family about infection control measures (gloving, hand sanitation, MDROs, disposal of soiled dressings, antibiotic therapy): Robles Rosario Assess pain and patients pain tolerance level:  Evaluate effectiveness of pain management interventions:  Assess patient's nutrition on each wound care visit (including protein and fluid intake): Robles Rosario Educate on diet and hydration required to enhance wound healing:  Teach patient and caregiver how to change wound dressing:  Complete Fall Risk Assessment upon admission to wound program and with change in condition/implement identified interventions:    Elevate legs above heart 30 minutes 3 times a day, walk 30 minutes daily, reduced sodium diet, diuretics as ordered, wear compression hose or wrap as ordered:     Other:  Care plan initiated 6/13/16      Signatures   Electronically signed by : Brooklyn Suarez RN; Jul 15 2016  2:13PM EST (Author)

## 2018-01-16 NOTE — MISCELLANEOUS
Physical Exam    Wound #1 Assessment wound #1 Location:, right lower leg, started on 16, Care for this wound started on 16  Wound Status: not healed  Length: 0 7cm x Width: 4 5cm x Depth: 0 7cm   Total: 3 15sq cm   Wound Volume: 2 205cm3   Tunneling 0 8cm at 3 o'clock          Tissue type: Subcutaneous, Granulation and Slough   Color of Wound: Red - 80% and Yellow - 20%   Exudate Amount: Minimal   Exudate Type: Serosangiunous   Odor: None   Exudate Color: Tan   Wound Edges: Intact and Epidermal Migration   Periwound Skin Condition: Intact, Edema           Wound Drsg  Orders/Instructions  Wound Identification Dressing Orders--Instructions:   Wound Identification and Instructions   Wound #1: RLE    Wound Care Instructions  Discussed with Patient/Caregiver  Dressing Type: Acticoat 7  Wash with mild soap and water, normal saline, wound cleanser  As specified, use: DO NOT GET WET  Apply specified dressing to wound base/bed  To periwound apply: Calmoseptine  Secondary dressing apply: Gauze, ABD  Secure with: Kerlix, tape  Dressing change frequency: Three times per week, at [facility]  Comments/Other:   Drawtex over acticoat 7 today until next dressing change on   Then stop acticoat 7  Apply compression usin" Ace Wrap and Tubigrip F  Future Appointments    Date/Time Provider Specialty Site   2016 08:30 AM Ben Kearney MD General Surgery [facility] WOUND CARE   2016 10:00 AM Duy Frias[address]   02/36/2242 40:69 PM Tonya Malave DO [facility]   2016 08:30 AM Wound Care Bisi, Nurse Schedule  [facility]   2016 01:00 PM Wound Care Bisi Nurse Schedule   [facility] Plan  Wound Nursing Care Plan [employer]:    Wound Nursing Care Plan   Impaired Tissue Integrity related to:   Knowledge Deficit Related To:   Risk for Infection related to open wound: Pain related to disease process and/or wound treatment:   Imbalanced Nutrition, less than body requirements related to inadequate intake and/or disease process: inadequate protein intake   Risk for Fall related to criteria noted on Fall Risk Assessment:   Goals   Patient will achieve 100% epithelialization:  Patient will demonstrate and verbalize knowledge of their disease process and management:  Patient will verbalize signs and symptoms of infection and when to notify physician or FIELD [facility]:    Patient will demonstrate and verbalize infection control and preventative measures:  Patient will report adequate pain management:  Patient will verbalize knowledge of and demonstrate adherence to interventions to achieve optimal nutrition required for wound healing:  Patient will not experience a fall:  Patient will maintain or achieve adequate hydration: and nutrition  Wound Nursing Care Interventions:   Provide moist wound healing:  Evaluate current medication regime for medications which may slow/impede wound healing:  Teach patient and/or family about disease process and management methods:  Teach patient and/or family about infection control measures (gloving, hand sanitation, MDROs, disposal of soiled dressings, antibiotic therapy): Tio Side Assess pain and patients pain tolerance level:  Evaluate effectiveness of pain management interventions:  Assess patient's nutrition on each wound care visit (including protein and fluid intake): [place] Side Educate on diet and hydration required to enhance wound healing:  Teach patient and caregiver how to change wound dressing:  Complete Fall Risk Assessment upon admission to wound program and with change in condition/implement identified interventions:    Elevate legs above heart 30 minutes 3 times a day, walk 30 minutes daily, reduced sodium diet, diuretics as ordered, wear compression hose or wrap as ordered:     Other:  Care plan initiated 6/13/16      Signatures Electronically signed by : Cora Lennon RN; Jul 1 2016  1:30PM EST                       (Author)    Electronically signed by : Cora Lennon RN; Jul 1 2016  1:33PM EST                       (Author)    Electronically signed by : Cora Lennon RN; Jul 1 2016  3:28PM EST                       (Author)    Electronically signed by : Cora Lennon RN; Jul 1 2016  3:31PM EST                       (Author)

## 2018-01-16 NOTE — PROGRESS NOTES
Chief Complaint  Chief Complaint Free Text Note Form: Initial visit at [address] [place] [address] for Right lower leg  Patient is established to [facility]  Patient reports she "passed out" and fell down approx 3 step on 6/2/16 at which time she sustained the RLE wound  She was seen in the [facility] ED and the wound was sutured  She was seen by her PCP recently who referred her here to the [address] [place] [address]  History of Present Illness  Wound Identification HPI:   Wound Identification HPI   Wound #1: right lower leg        Visit Information:   The patient came to Wound Care and was ambulatory  The patient is being seen for follow-up with RN at the [address] [place] [address]  The patient is accompanied by her self  The patient's identification was verified  A secondary verification process was completed  Orientation: oriented to person, oriented to place and oriented to time  Blood Glucose:  Not applicable  6/13/16 Patient arrives for initial visit for assessment and treatment of Right lower leg laceration  Referred by Dr Tigist Mckinney  On 6/2/16, patient fell down cellar stairs and does not remember how she fell  Blood sugar taken during visit today 91mg/mL Patient did not eat breakfast  To return to PCP for follow up on "Passing out"  Wound sutured with surrounding skin hard and indurated,Patient complaining of pain when area palpated  Continues to work as a  with legs dependent all day  Pitting edema noted of RLE  Pedal pulses palpable  6/15/2016 Patient arrived with dressing intact  RLE appears to be edematous  Patient states that she is experiencing pain in her RLE  Cassidy wound redness decreased, right toes appear to still be ecchymotic         Emery Fall Scale:     History of Falling: Yes = 25   Secondary Diagnosis: Yes = 15   Ambulatory Aid None, Bedrest, Nurse Assist = 0   No = 0   Gait: Normal = 0   Mental Status: Oriented to own ability = 0   Total Score: 40    25 - 45 = Moderate Risk Joey Scale:   Joey Scale:   Sensory Perception: No impairment = 4   Moisture: Rarely moist = 4   Activity: Walks frequently = 4   Mobility: No limitations = 4   Nutrition: Adequate = 3   Friction and Shear: No apparent problem = 3   Total Joey Score: 22       Fall, Nutrition, Mobility, Neuropsychological Assessment: The most recent fall occurred 6/2/16 and Also reported fall in April 2016  Number of falls in the last year were 2  The fall resulted from loss of balance and passed out  The fall was preceded by no known event and reports no warning symptoms of fall  The fall resulted in contusion(s) to R lateral LE, L knee, R 2nd, 3rd, 4th toes and laceration(s) of right lower extremity  A referral was made for Physician and PCP for work up regarding etiology of falls  Nutrition Assessment Screening: Food intake over the last 3 months due to the loss of appetite, digestive problems, chewing or swallowing difficulties is graded as: 2 = no decrease in food intake   Weight loss during the last 3 months: 3 = no weight loss   Mobility scored as: 2 = goes out  Psychological Stress and Acute Disease Scored as: 2 = The patient has not experienced psychological stress or acute disease in the last 3 months  Neuropsychological problems scored as: 2 = no psychological problems  Body Mass Index (BMI) scored as: 3 = BMI 23 or greater  Nutritional Assessment Screening Score: 8 - 11 points - At risk of malnutrition  Hospital Based Practices Required Assessment:   Pain Assessment   the patient states they have pain  The pain is located in the right lower extremity  (on a scale of 0 to 10, the patient rates the pain at 6 )   Abuse And Domestic Violence Screen    Yes, the patient is safe at home  The patient states no one is hurting them  Depression And Suicide Screen  No, the patient has not had thoughts of hurting themself  No, the patient has not felt depressed in the past 7 days     Prefered Language joe  [place]  Primary Language is  English  Readiness To Learn: Receptive  Barriers To Learning: none  Preferred Learning: demonstration and written   Education Completed: disease/condition, medications, treatment/procedure and equipment/supplies   Teaching Method: written and demonstration   Person Taught: patient   Evaluation Of Learning: verbalized/demonstrated understanding and needs reinforcement      Active Problems    1  ACE-inhibitor cough (786 2,E942 6) (R05,T46 4X5A)   2  Acute upper respiratory infection (465 9) (J06 9)   3  Allergic conjunctivitis (372 14) (H10 10)   4  Benign essential hypertension (401 1) (I10)   5  Depression (311) (F32 9)   6  Encounter for screening colonoscopy (V76 51) (Z12 11)   7  Fall, accidental (E888 9) (W19 XXXA)   8  Fatigue (780 79) (R53 83)   9  Hematoma of lower extremity, right, sequela (906 3) (S80 11XS)   10  Influenza-like symptoms (780 99) (R68 89)   11  Irritable bowel syndrome (564 1) (K58 9)   12  Laceration of right lower extremity, initial encounter (894 0) (S81 811A)   13  Lipoma (214 9) (D17 9)   14  Lower back pain (724 2) (M54 5)   15  Menopausal symptoms (627 2) (N95 1)   16  Neuropathy (355 9) (G62 9)   17  Nontoxic single thyroid nodule (241 0) (E04 1)   18  OCD (obsessive compulsive disorder) (300 3) (F42)   19  Sinus infection (473 9) (J32 9)   20  Sore throat (462) (J02 9)   21  Thyroid disorder (246 9) (E07 9)   22  Upper respiratory infection (465 9) (J06 9)   23  Vitamin B12 deficiency (266 2) (E53 8)   24  Vitamin D deficiency (268 9) (E55 9)    Past Medical History    1  History of Abnormal weight gain (783 1) (R63 5)   2  History of Cough (786 2) (R05)   3  History of Dysfunction of left Eustachian tube (381 81) (H69 82)   4  History of Dysfunction of right Eustachian tube (381 81) (H69 81)    Surgical History    1  History of  Section   2  History of Complete Colonoscopy   3  History of Gallbladder Surgery   4   History of Tonsillectomy With Adenoidectomy   5  History of Tubal Ligation   6  History of Vaginal Hysterectomy    Family History    1  Family history of aortic stenosis (V17 49) (Z82 49)   2  Family history of heart murmur (V17 49) (Z84 89)   3  Family history of Hypertension, benign    4  Family history of Benign Essential Hypertension   5  Family history of Coronary Artery Stenosis    Social History    · Being A Social Drinker   · Former smoker (Y98 87) (U47 401)   · Single    Current Meds   1  Cephalexin 500 MG Oral Capsule; TAKE 1 CAPSULE EVERY 6 HOURS DAILY; Therapy: 23BPP0138 to (Evaluate:20Jun2016)  Requested for: 79RUC8743; Last   Rx:13Jun2016 Ordered   2  Losartan Potassium-HCTZ 50-12 5 MG Oral Tablet; TAKE 1 TABLET DAILY; Therapy: 29HPO8814 to (Evaluate:24Feb2017)  Requested for: 05UQQ2832; Last   Rx:01Mar2016 Ordered   3  Multivitamins TABS; Therapy: (Recorded:70Jmp2181) to Recorded   4  Venlafaxine HCl  MG Oral Tablet Extended Release 24 Hour; Take 1 tablet daily; Therapy: 48GJF9273 to (Evaluate:24Feb2017)  Requested for: 73BJI5010; Last   Rx:01Mar2016 Ordered   5  Vitamin B-12 1000 MCG Oral Tablet; TAKE 1 TABLET DAILY AS DIRECTED; Therapy: 56WXN7637 to (Evaluate:23Jan2016) Recorded   6  Vitamin D-3 5000 UNIT TABS; Therapy: (Recorded:44Ucf3573) to Recorded    Allergies    1  ACE Inhibitors    Vitals  Signs [Data Includes: Current Encounter]   Recorded: 51IUO0133 01:24PM   Temperature: 96 6 F, Tympanic  Heart Rate: 78, R Radial  Pulse Quality: Normal, R Radial  Respiration: 18  Respiration Quality: Normal  Systolic: 166, RUE, Sitting  Diastolic: 80, RUE, Sitting  Height: 5 ft 2 in  Weight: 195 lb   BMI Calculated: 35 67  BSA Calculated: 1 89  Pain Scale: 4    Physical Exam    Wound #1 Assessment wound #1 Location:, right lower leg, started on 6/2/16, Care for this wound started on 6/13/16  Wound Status: not healed     Length: 4 8cm x Width: 5 6cm x Depth: 1cm   Total: 26 88sq cm   Wound Volume: 26 88cm3       Undermining 2 5cm from 2 o'clock to 3 o'clock   Undermining 2 9cm from 5 o'clock to 6 o'clock   Undermining 1cm from 12 o'clock to    Tissue type: Granulation and Eschar   Color of Wound: Red - 95% and Black - 5%   Exudate Amount: Moderate   Exudate Type: Serosangiunous   Odor: None   Exudate Color: Yellow   Wound Edges: Intact   Periwound Skin Condition: Intact, Erythematous, Edema           Procedure      Wound #1: right lower extremity     Nurse Dressing Change:   Wound #wound one The wound located on the RLE  Wound care rendered as per Physician/Advanced Practitioner order/plan  Return to [address] [place] [address] 6/17/2015 f/u for Nurse dressing change     Comments:      Wound Drsg  Orders/Instructions  Wound Identification Dressing Orders--Instructions:   Wound Identification and Instructions   Wound #1: right lower leg    Wound Care Instructions  Discussed with Patient/Caregiver  Dressing Type: AMD  Wash with mild soap and water, normal saline, wound cleanser  As specified, use: Irrigate with NSS  Apply specified dressing to wound base/bed  If using packing in a tunnel, secure tail to skin with tape   Secondary dressing apply: Gauze, 4 x 4  ABD  Secure with: Natalie, tape  Dressing change frequency: Three times per week, at [facility]  Comments/Other: Ace wrap (4" x 10') base of toes to knee  Apply compression using: Tubigrip F  Wound Goals  Wound Goals:   Healing Goals:   Good healing potential    Wound base will be 25%    Patient will achieve full wound closure and return to full ADLs       Impression  Wound [address]:    Wound Nursing Care Plan   Impaired Tissue Integrity related to:   Knowledge Deficit Related To:   Risk for Infection related to open wound:   Pain related to disease process and/or wound treatment:   Imbalanced Nutrition, less than body requirements related to inadequate intake and/or disease process: inadequate protein intake   Risk for Fall related to criteria noted on Fall Risk Assessment:   Goals   Patient will achieve 100% epithelialization:  Patient will demonstrate and verbalize knowledge of their disease process and management:  Patient will verbalize signs and symptoms of infection and when to notify physician or FIELD [facility]:    Patient will demonstrate and verbalize infection control and preventative measures:  Patient will report adequate pain management:  Patient will verbalize knowledge of and demonstrate adherence to interventions to achieve optimal nutrition required for wound healing:  Patient will not experience a fall:  Patient will maintain or achieve adequate hydration: and nutrition  Wound Nursing Care Interventions:   Provide moist wound healing:  Evaluate current medication regime for medications which may slow/impede wound healing:  Teach patient and/or family about disease process and management methods:  Teach patient and/or family about infection control measures (gloving, hand sanitation, MDROs, disposal of soiled dressings, antibiotic therapy): Lashanda Calvillo Assess pain and patients pain tolerance level:  Evaluate effectiveness of pain management interventions:  Assess patient's nutrition on each wound care visit (including protein and fluid intake): Lashanda Calvillo Educate on diet and hydration required to enhance wound healing:  Teach patient and caregiver how to change wound dressing:  Complete Fall Risk Assessment upon admission to wound program and with change in condition/implement identified interventions:    Elevate legs above heart 30 minutes 3 times a day, walk 30 minutes daily, reduced sodium diet, diuretics as ordered, wear compression hose or wrap as ordered:     Other:  Care plan initiated 6/13/16      Future Appointments    Date/Time Provider Specialty Site   06/20/2016 02:15 PM Alexia Jones MD General Surgery [facility] WOUND CARE   82/64/4514 83:50 PM Liana Lara DO Family Medicine [facility] FAMILY [facility]   06/17/2016 01:30 PM Wound Care Bisi, Nurse Schedule  [facility]  [address]   Electronically signed by : Emilia Montejo RN; Asad 15 2016  1:51PM EST                       (Author)

## 2018-01-16 NOTE — MISCELLANEOUS
Physical Exam    Wound #1 Assessment wound #1 Location:, right lower leg, started on 16, Care for this wound started on 16  Wound Status: not healed  Length: 1 4cm x Width: 5cm x Depth: 1 2cm   Total: 7sq cm   Wound Volume: 8 4cm3       Undermining 2 2cm from 12 o'clock to 6 o'clock      Tissue type: Subcutaneous, Granulation, Slough and unable to visualize wound bed   Color of Wound: Yellow - 15% and Pink - 85%   Exudate Amount: Large   Exudate Type: Serosangiunous and Bloody drainage   Odor: None   Exudate Color: Yellow and bloody,brown   Wound Edges: Intact   Periwound Skin Condition: Intact, Erythematous, Edema           Wound Drsg  Orders/Instructions  Wound Identification Dressing Orders--Instructions:   Wound Identification and Instructions   Wound #1: right lower leg    Wound Care Instructions  Discussed with Patient/Caregiver  Dressing Type: AMD  Wash with mild soap and water, normal saline, wound cleanser  As specified, use: Irrigate with NSS  Apply specified dressing to wound base/bed  To periwound apply: Calmoseptine  If using packing in a tunnel, secure tail to skin with tape   Secondary dressing apply: Gauze, 4 x 4  ABD  Secure with: Natalie, tape  Dressing change frequency: Three times per week, at [facility]  Comments/Other: Ace wrap (4" x 10') base of toes to knee followed by Tubigrip F  Apply compression usin" Ace Wrap and Tubigrip F  Future Appointments    Date/Time Provider Specialty Site   2016 01:15 PM Ben Kearney MD General Surgery [facility] WOUND CARE   2016 10:00 AM Duy Frias, [facility]    79:77 PM Tonya Malave DO Family Medicine [facility]   2016 02:00 PM Wound Care [place], Nurse Schedule   [facility] Plan  Wound Nursing Care Plan Sagrario Bagley:    Wound Nursing Care Plan   Impaired Tissue Integrity related to:   Knowledge Deficit Related To:   Risk for Infection related to open wound:   Pain related to disease process and/or wound treatment:   Imbalanced Nutrition, less than body requirements related to inadequate intake and/or disease process: inadequate protein intake   Risk for Fall related to criteria noted on Fall Risk Assessment:   Goals   Patient will achieve 100% epithelialization:  Patient will demonstrate and verbalize knowledge of their disease process and management:  Patient will verbalize signs and symptoms of infection and when to notify physician or [facility]:    Patient will demonstrate and verbalize infection control and preventative measures:  Patient will report adequate pain management:  Patient will verbalize knowledge of and demonstrate adherence to interventions to achieve optimal nutrition required for wound healing:  Patient will not experience a fall:  Patient will maintain or achieve adequate hydration: and nutrition  Wound Nursing Care Interventions:   Provide moist wound healing:  Evaluate current medication regime for medications which may slow/impede wound healing:  Teach patient and/or family about disease process and management methods:  Teach patient and/or family about infection control measures (gloving, hand sanitation, MDROs, disposal of soiled dressings, antibiotic therapy): Reese Avila Assess pain and patients pain tolerance level:  Evaluate effectiveness of pain management interventions:  Assess patient's nutrition on each wound care visit (including protein and fluid intake): Reese Avila Educate on diet and hydration required to enhance wound healing:  Teach patient and caregiver how to change wound dressing:  Complete Fall Risk Assessment upon admission to wound program and with change in condition/implement identified interventions:    Elevate legs above heart 30 minutes 3 times a day, walk 30 minutes daily, reduced sodium diet, diuretics as ordered, wear compression hose or wrap as ordered:     Other:  Care plan initiated 6/13/16      Signatures   Electronically signed by : Jany Nunez RN; Jun 22 2016  3:42PM EST                       (Author)

## 2018-01-16 NOTE — RESULT NOTES
Message   cxray unremarkable, has some scoliosis incidental finding on cxray  Verified Results  * XR CHEST PA & LATERAL [id number] 01:29PM Tee Francisco Order Number: [id number]     Test Name Result Flag Reference   XR CHEST PA & LATERAL (Report)     CHEST     INDICATION: Pain in both breasts  Chest pain  Fell down stairs  Passed out  Remote tobacco abuse  Hypertension  COMPARISON: None     VIEWS: PA and lateral; 2 images     FINDINGS:     The cardiomediastinal silhouette is unremarkable  The lungs are clear  No pleural effusions  Moderately severe thoracolumbar levoscoliosis  Surgical clips are noted in the right upper quadrant of the abdomen  IMPRESSION:     No active pulmonary disease  Thoracolumbar scoliosis          Workstation performed: [id number]     Signed by:   Elisha Mcknight MD   7/6/16

## 2018-01-16 NOTE — PROGRESS NOTES
Chief Complaint  Chief Complaint Free Text Note Form: Nurse dressing change for RLE, multi wound  History of Present Illness  Wound Identification HPI:   Wound Identification HPI   Wound #1: right lower leg (multi)        Visit Information:   The patient came to Wound Care and was ambulatory  The patient is being seen for a follow-up with MD at the [address] [place] [address]  The patient is accompanied by her self  The patient's identification was verified  A secondary verification process was completed  Orientation: oriented to person, oriented to place and oriented to time  Blood Glucose:  Not applicable  6/13/16 Patient arrives for initial visit for assessment and treatment of Right lower leg laceration  Referred by Dr Tigist Mckinney  On 6/2/16, patient fell down cellar stairs and does not remember how she fell  Blood sugar taken during visit today 91mg/mL Patient did not eat breakfast  To return to PCP for follow up on "Passing out"  Wound sutured with surrounding skin hard and indurated,Patient complaining of pain when area palpated  Continues to work as a  with legs dependent all day  Pitting edema noted of RLE  Pedal pulses palpable  6/15/2016 Patient arrived with dressing intact  RLE appears to be edematous  Patient states that she is experiencing pain in her RLE  Cassidy wound redness decreased, right toes appear to still be ecchymotic    6-17-16-Patient arrived with dressing intact  11cm of AMD was removed from wound base today  Right toes still appear ecchymotic although are resolving  Patient states that she experiences"twinging" pain at times in her RLE  Ecchymosis remains on L lateral LE  6/22/2016 Patient arrived with dressing intact  Strike through drainage present on Tubigrip F  RLE still appears edematous  6/24/2016 Patient arrived with dressing, 4x10 inch ace and Tubigrip F intact  This morning patient states that her dressing did get wet through the cast cover   Patient did finish Cephalexin  6/29/16 Patient arrives with compression and dressings intact on RLE  optifoam has placed pressure on the distal wound edges and there is less edema  7/1/16 arrived with dressing, ace wrap and tubigrip intact to RLE  Offers no complaints and reports she is "better" today  She reports she has been taking Amoxicillin for a dental problem since Tuesday 6/29 7-5-16 arrived today with dressing contact to RLE  Reports dressing "got wet" this AM in the shower so she took the kerlix off  Remaining dressing dry upon arrival  Skin bridge is present in wound converting wound to multi (2) wound)  Reports is seeing PCP today after this visit regarding falls  7/8/2016 Patient arrived with dressing intact  Patient had an CT-Scan of her head on 7/6/2016 and chest x-ray to help r/o while patient fell 5 weeks ago  7/11/2016 Patient arrived with dressing intact  Patient states that she prefers to wear the 4x10 inch ace  Patient reports that her RLE feels better with the ace applied and it also helps keep the dressing intact  7/13/2016: Arrived with dressing intact  Lower leg edema improving  7/15/16: Arrived with dressing intact  Wound smaller and tunnel closed down  7/18/2016 Patient arrived with dressing intact  Wound size had contracted  Emery Fall Scale:     History of Falling: Yes = 25   Secondary Diagnosis: Yes = 15   Ambulatory Aid None, Bedrest, Nurse Assist = 0   No = 0   Gait: Normal = 0   Mental Status: Oriented to own ability = 0   Total Score: 40    25 - 45 = Moderate Risk        Fall, Nutrition, Mobility, Neuropsychological Assessment: The most recent fall occurred denies any falls since last visit, 7/18/2016  The fall resulted from passed out  The fall was preceded by reports no warning symptoms of fall     Nutrition Assessment Screening: Food intake over the last 3 months due to the loss of appetite, digestive problems, chewing or swallowing difficulties is graded as: 2 = no decrease in food intake Weight loss during the last 3 months: 3 = no weight loss   Mobility scored as: 2 = goes out  Psychological Stress and Acute Disease Scored as: 2 = The patient has not experienced psychological stress or acute disease in the last 3 months  Neuropsychological problems scored as: 2 = no psychological problems  Body Mass Index (BMI) scored as: 3 = BMI 23 or greater  Nutritional Assessment Screening Score: 8 - 11 points - At risk of malnutrition  Hospital Based Practices Required Assessment:   Pain Assessment   the patient states they do not have pain  The pain is located in the denies  The pain radiates to the denies  The patient describes the pain as  (on a scale of 0 to 10, the patient rates the pain at 0 )   Abuse And Domestic Violence Screen    Yes, the patient is safe at home  The patient states no one is hurting them  Depression And Suicide Screen  No, the patient has not had thoughts of hurting themself  No, the patient has not felt depressed in the past 7 days  Prefered Language is  [place]  Primary Language is  English  Readiness To Learn: Receptive  Barriers To Learning: none  Preferred Learning: demonstration and written   Education Completed: disease/condition, medications, treatment/procedure and equipment/supplies   Teaching Method: written and demonstration   Person Taught: patient   Evaluation Of Learning: verbalized/demonstrated understanding and needs reinforcement      Active Problems    1  ACE-inhibitor cough (786 2,E942 6) (R05,T46 4X5A)   2  Acute upper respiratory infection (465 9) (J06 9)   3  Allergic conjunctivitis (372 14) (H10 10)   4  Benign essential hypertension (401 1) (I10)   5  Change in mental status (780 97) (R41 82)   6  Chest pain (786 50) (R07 9)   7  Depression (311) (F32 9)   8  Encounter for screening colonoscopy (V76 51) (Z12 11)   9  Fall, accidental (E888 9) (W19 XXXA)   10  Fatigue (780 79) (R53 83)   11   Hematoma of lower extremity, right, sequela (906 3) (S80 11XS)   12  Influenza-like symptoms (780 99) (R68 89)   13  Irritable bowel syndrome (564 1) (K58 9)   14  Laceration of right lower extremity, initial encounter (894 0) (S81 811A)   15  Laceration of right lower leg with complication, sequela (792 8) (S81 811S)   16  Lipoma (214 9) (D17 9)   17  Lower back pain (724 2) (M54 5)   18  Menopausal symptoms (627 2) (N95 1)   19  Neuropathy (355 9) (G62 9)   20  Non-healing surgical wound, initial encounter (998 83) (T81 89XA)   21  Nontoxic single thyroid nodule (241 0) (E04 1)   22  Numbness and tingling of right arm (782 0) (R20 0,R20 2)   23  OCD (obsessive compulsive disorder) (300 3) (F42)   24  Open wound of right lower extremity (891 0) (S81 801A)   25  Sinus infection (473 9) (J32 9)   26  Sore throat (462) (J02 9)   27  Syncope (780 2) (R55)   28  Thyroid disorder (246 9) (E07 9)   29  Traumatic hematoma of right lower leg (924 10) (S80 11XA)   30  Upper respiratory infection (465 9) (J06 9)   31  Vitamin B12 deficiency (266 2) (E53 8)   32  Vitamin D deficiency (268 9) (E55 9)    Past Medical History    1  History of Abnormal weight gain (783 1) (R63 5)   2  History of Cough (786 2) (R05)   3  History of Dysfunction of left Eustachian tube (381 81) (H69 82)   4  History of Dysfunction of right Eustachian tube (381 81) (H69 81)    Surgical History    1  History of  Section   2  History of Complete Colonoscopy   3  History of Gallbladder Surgery   4  History of Tonsillectomy With Adenoidectomy   5  History of Tubal Ligation   6  History of Vaginal Hysterectomy    Family History    1  Family history of aortic stenosis (V17 49) (Z82 49)   2  Family history of heart murmur (V17 49) (Z84 89)   3  Family history of Hypertension, benign    4  Family history of Benign Essential Hypertension   5   Family history of Coronary Artery Stenosis    Social History    · Being A Social Drinker   · Former smoker (K01 72) (K15 977)   · Single    Current Meds   1  Losartan Potassium-HCTZ 50-12 5 MG Oral Tablet; TAKE 1 TABLET DAILY; Therapy: 40MCG7679 to (Evaluate:24Feb2017)  Requested for: 87FAO5875; Last   Rx:01Mar2016 Ordered   2  Multivitamins TABS; Therapy: (Recorded:22Feb2013) to Recorded   3  Venlafaxine HCl  MG Oral Tablet Extended Release 24 Hour; Take 1 tablet daily; Therapy: 46XBH1373 to (Evaluate:24Feb2017)  Requested for: 96VVF4567; Last   Rx:01Mar2016 Ordered   4  Vitamin B-12 1000 MCG Oral Tablet; TAKE 1 TABLET DAILY AS DIRECTED; Therapy: 80PRZ1822 to (Evaluate:23Jan2016) Recorded   5  Vitamin D-3 5000 UNIT TABS; Therapy: (Recorded:22Feb2013) to Recorded    Allergies    1  ACE Inhibitors    Vitals  Signs   Recorded: 59GBT8491 57:05PR   Systolic: 803, RUE, Sitting  Diastolic: 80, RUE, Sitting  Heart Rate: 72, R Radial  Pulse Quality: Normal, R Radial  Respiration Quality: Normal  Respiration: 18  Temperature: 96 2 F, Tympanic  Pain Scale: 0  Height: 5 ft 3 in  Weight: 195 lb   BMI Calculated: 34 54  BSA Calculated: 1 91    Physical Exam    Wound #1 Assessment wound #1 Location:, right lower leg (multi), started on 6/2/16, Care for this wound started on 6/13/16  Wound Status: not healed  Length: 0 1cm x Width: 0 1cm x Depth: 0 1cm   Total: 0 01sq cm   Wound Volume: 0 001cm3           Tissue type: fragile epithelium   Color of Wound: Red - 90% and Yellow - 10%   Exudate Amount: Scant   Exudate Type: Serosangiunous   Odor: None   Exudate Color: Yellow   Wound Edges: Intact and Epidermal Migration   Periwound Skin Condition: Intact, lower leg edema   Comments: wound covered with fragile epithelium at this visit and appears almost healed  Procedure      Wound #1: right lower extremity multi wound     Nurse Dressing Change:   Wound #wound one The wound located on the RLE   Wound care rendered as per Physician/Advanced Practitioner order/plan  Return to [address] [place] [address] F/u on 7/22/2016 with Dr Marissa Thomas     Comments: Wound Drsg  Orders/Instructions  Wound Identification Dressing Orders--Instructions:   Wound Identification and Instructions   Wound #1: right lower extremity multi wound  Wound Care Instructions  Discussed with Patient/Caregiver  Dressing Type: Acticoat 3  Wash with mild soap and water, normal saline, wound cleanser  As specified, use: NSS  Apply specified dressing to wound base/bed  To periwound apply: Calmoseptine  Secondary dressing apply: Gauze  Secure with: Meplex border  Dressing change frequency: Three times per week, at [facility]   Apply compression using: Tubigrip F  Wound Goals  Wound Goals:   Healing Goals:   Good healing potential    Wound base will be 25%    Patient will achieve full wound closure and return to full ADLs       Impression  Wound [address]: Wound Nursing Care Plan   Impaired Tissue Integrity related to:   Knowledge Deficit Related To:   Risk for Infection related to open wound:   Pain related to disease process and/or wound treatment:   Imbalanced Nutrition, less than body requirements related to inadequate intake and/or disease process: inadequate protein intake   Risk for Fall related to criteria noted on Fall Risk Assessment:   Goals   Patient will achieve 100% epithelialization:  Patient will demonstrate and verbalize knowledge of their disease process and management:  Patient will verbalize signs and symptoms of infection and when to notify physician or [facility]:    Patient will demonstrate and verbalize infection control and preventative measures:  Patient will report adequate pain management:  Patient will verbalize knowledge of and demonstrate adherence to interventions to achieve optimal nutrition required for wound healing:  Patient will not experience a fall:  Patient will maintain or achieve adequate hydration: and nutrition  Wound Nursing Care Interventions:   Provide moist wound healing:     Evaluate current medication regime for medications which may slow/impede wound healing:  Teach patient and/or family about disease process and management methods:  Teach patient and/or family about infection control measures (gloving, hand sanitation, MDROs, disposal of soiled dressings, antibiotic therapy): Milton Organ Assess pain and patients pain tolerance level:  Evaluate effectiveness of pain management interventions:  Assess patient's nutrition on each wound care visit (including protein and fluid intake): Milton Organ Educate on diet and hydration required to enhance wound healing:  Teach patient and caregiver how to change wound dressing:  Complete Fall Risk Assessment upon admission to wound program and with change in condition/implement identified interventions:    Elevate legs above heart 30 minutes 3 times a day, walk 30 minutes daily, reduced sodium diet, diuretics as ordered, wear compression hose or wrap as ordered:  Other:  Care plan initiated 6/13/16      Future Appointments    Date/Time Provider Specialty Site   07/22/2016 08:30 AM Anya Toledo MD General Surgery [facility]   07/26/2016 11:00 AM MARIANA Morales   Cardiology  CARDIOLOGY  [place]   54/83/0312 37:75 PM Samaria Cavazos Family Medicine [facility]     Signatures   Electronically signed by : Frank Thomas RN; Jul 18 2016  5:15PM EST                       (Author)

## 2018-01-18 NOTE — MISCELLANEOUS
Physical Exam    Wound #1 Assessment wound #1 Location:, right lower leg (multi), started on 6/2/16, Care for this wound started on 6/13/16  Wound Status: not healed  Length: 0 1cm x Width: 0 1cm x Depth: 0 1cm   Total: 0 01sq cm   Wound Volume: 0 001cm3           Tissue type: fragile epithelium   Color of Wound: Red - 90% and Yellow - 10%   Exudate Amount: Scant   Exudate Type: Serosangiunous   Odor: None   Exudate Color: Yellow   Wound Edges: Intact and Epidermal Migration   Periwound Skin Condition: Intact, lower leg edema   Comments: wound covered with fragile epithelium at this visit and appears almost healed  Wound Drsg  Orders/Instructions  Wound Identification Dressing Orders--Instructions:   Wound Identification and Instructions   Wound #1: right lower extremity multi wound  Wound Care Instructions  Discussed with Patient/Caregiver  Dressing Type: Acticoat 3  Wash with mild soap and water, normal saline, wound cleanser  As specified, use: NSS  Apply specified dressing to wound base/bed  To periwound apply: Calmoseptine  Secondary dressing apply: Gauze  Secure with: Meplex border  Dressing change frequency: Three times per week, at [facility]   Apply compression using: Tubigrip F  Future Appointments    Date/Time Provider Specialty Site   07/22/2016 08:30 AM Dianne Hein MD General Surgery [facility]   07/26/2016 11:00 AM MARIANA Damon  Cardiology  CARDIOLOGY  [place]   02/53/5514 32:03 PM Tiffanie Washington, DO Family Medicine [facility]     [address] Plan  Wound Nursing Care Plan [place]:    Wound Nursing Care Plan   Impaired Tissue Integrity related to:   Knowledge Deficit Related To:   Risk for Infection related to open wound:   Pain related to disease process and/or wound treatment:   Imbalanced Nutrition, less than body requirements related to inadequate intake and/or disease process: inadequate protein intake   Risk for Fall related to criteria noted on Fall Risk Assessment:   Goals   Patient will achieve 100% epithelialization:  Patient will demonstrate and verbalize knowledge of their disease process and management:  Patient will verbalize signs and symptoms of infection and when to notify physician or [facility]:    Patient will demonstrate and verbalize infection control and preventative measures:  Patient will report adequate pain management:  Patient will verbalize knowledge of and demonstrate adherence to interventions to achieve optimal nutrition required for wound healing:  Patient will not experience a fall:  Patient will maintain or achieve adequate hydration: and nutrition  Wound Nursing Care Interventions:   Provide moist wound healing:  Evaluate current medication regime for medications which may slow/impede wound healing:  Teach patient and/or family about disease process and management methods:  Teach patient and/or family about infection control measures (gloving, hand sanitation, MDROs, disposal of soiled dressings, antibiotic therapy): Gerhardt Sep Assess pain and patients pain tolerance level:  Evaluate effectiveness of pain management interventions:  Assess patient's nutrition on each wound care visit (including protein and fluid intake): Gerhardt Sep Educate on diet and hydration required to enhance wound healing:  Teach patient and caregiver how to change wound dressing:  Complete Fall Risk Assessment upon admission to wound program and with change in condition/implement identified interventions:    Elevate legs above heart 30 minutes 3 times a day, walk 30 minutes daily, reduced sodium diet, diuretics as ordered, wear compression hose or wrap as ordered:     Other:  Care plan initiated 6/13/16      Signatures   Electronically signed by : Angelo Layne RN; Jul 18 2016  5:15PM EST                       (Author)

## 2018-01-18 NOTE — RESULT NOTES
Message   xray left knee wnl  Verified Results  * XR KNEE 4+ VW LEFT INJURY 31Jan2017 09:43AM Latia Rios   TW Order Number: [id number]     Test Name Result Flag Reference   XR KNEE 4+ VW LEFT (Report)     LEFT KNEE     INDICATION: Left knee pain  COMPARISON: None     VIEWS: 4; 4 images     FINDINGS:     There is no acute fracture or dislocation  There is no joint effusion  No degenerative changes  No lytic or blastic lesions are seen  Soft tissues are unremarkable  IMPRESSION:     No acute osseous abnormality         Workstation performed: [id number]     Signed by:   James Ojeda MD   1/31/17

## 2018-01-18 NOTE — PROGRESS NOTES
Chief Complaint  Chief Complaint Free Text Note Form: F/u for nurse dressing change for RLE wound  History of Present Illness  Wound Identification HPI:   Wound Identification HPI   Wound #1: right lower leg        Visit Information:   The patient came to Wound Care and was ambulatory  The patient is being seen for follow-up with RN at the [address] [place] [address]  The patient is accompanied by her self  The patient's identification was verified  A secondary verification process was completed  Orientation: oriented to person, oriented to place and oriented to time  Blood Glucose:  Not applicable  6/13/16 Patient arrives for initial visit for assessment and treatment of Right lower leg laceration  Referred by Dr Tigist Mckinney  On 6/2/16, patient fell down cellar stairs and does not remember how she fell  Blood sugar taken during visit today 91mg/mL Patient did not eat breakfast  To return to PCP for follow up on "Passing out"  Wound sutured with surrounding skin hard and indurated,Patient complaining of pain when area palpated  Continues to work as a  with legs dependent all day  Pitting edema noted of RLE  Pedal pulses palpable  6/15/2016 Patient arrived with dressing intact  RLE appears to be edematous  Patient states that she is experiencing pain in her RLE  Cassidy wound redness decreased, right toes appear to still be ecchymotic    6-17-16-Patient arrived with dressing intact  11cm of AMD was removed from wound base today  Right toes still appear ecchymotic although are resolving  Patient states that she experiences"twinging" pain at times in her RLE  Ecchymosis remains on L lateral LE         Emery Fall Scale:     History of Falling: Yes = 25   Secondary Diagnosis: Yes = 15   Ambulatory Aid None, Bedrest, Nurse Assist = 0   No = 0   Gait: Normal = 0   Mental Status: Oriented to own ability = 0   Total Score: 40    25 - 45 = Moderate Risk        Joey Scale:   Joey Scale:   Sensory Perception: No impairment = 4   Moisture: Rarely moist = 4   Activity: Walks frequently = 4   Mobility: No limitations = 4   Nutrition: Adequate = 3   Friction and Shear: No apparent problem = 3   Total Joey Score: 22       Fall, Nutrition, Mobility, Neuropsychological Assessment: The most recent fall occurred denies any falls since last visit, 6/17/2016  The fall resulted from passed out  The fall was preceded by reports no warning symptoms of fall  Nutrition Assessment Screening: Food intake over the last 3 months due to the loss of appetite, digestive problems, chewing or swallowing difficulties is graded as: 2 = no decrease in food intake   Weight loss during the last 3 months: 3 = no weight loss   Mobility scored as: 2 = goes out  Psychological Stress and Acute Disease Scored as: 2 = The patient has not experienced psychological stress or acute disease in the last 3 months  Neuropsychological problems scored as: 2 = no psychological problems  Body Mass Index (BMI) scored as: 3 = BMI 23 or greater  Nutritional Assessment Screening Score: 8 - 11 points - At risk of malnutrition  Hospital Based Practices Required Assessment:   Pain Assessment   the patient states they have pain  The pain is located in the right lower extremity  The patient describes the pain as Patient states that she experiences "Twinging"pain at times in her RLE  (on a scale of 0 to 10, the patient rates the pain at 3 )   Abuse And Domestic Violence Screen    Yes, the patient is safe at home  The patient states no one is hurting them  Depression And Suicide Screen  No, the patient has not had thoughts of hurting themself  No, the patient has not felt depressed in the past 7 days  Prefered Language is  [place]  Primary Language is  English  Readiness To Learn: Receptive  Barriers To Learning: none     Preferred Learning: demonstration and written   Education Completed: disease/condition, medications, treatment/procedure and equipment/supplies   Teaching Method: written and demonstration   Person Taught: patient   Evaluation Of Learning: verbalized/demonstrated understanding and needs reinforcement      Active Problems    1  ACE-inhibitor cough (786 2,E942 6) (R05,T46 4X5A)   2  Acute upper respiratory infection (465 9) (J06 9)   3  Allergic conjunctivitis (372 14) (H10 10)   4  Benign essential hypertension (401 1) (I10)   5  Depression (311) (F32 9)   6  Encounter for screening colonoscopy (V76 51) (Z12 11)   7  Fall, accidental (E888 9) (W19 XXXA)   8  Fatigue (780 79) (R53 83)   9  Hematoma of lower extremity, right, sequela (906 3) (S80 11XS)   10  Influenza-like symptoms (780 99) (R68 89)   11  Irritable bowel syndrome (564 1) (K58 9)   12  Laceration of right lower extremity, initial encounter (894 0) (S81 811A)   13  Lipoma (214 9) (D17 9)   14  Lower back pain (724 2) (M54 5)   15  Menopausal symptoms (627 2) (N95 1)   16  Neuropathy (355 9) (G62 9)   17  Nontoxic single thyroid nodule (241 0) (E04 1)   18  OCD (obsessive compulsive disorder) (300 3) (F42)   19  Sinus infection (473 9) (J32 9)   20  Sore throat (462) (J02 9)   21  Thyroid disorder (246 9) (E07 9)   22  Upper respiratory infection (465 9) (J06 9)   23  Vitamin B12 deficiency (266 2) (E53 8)   24  Vitamin D deficiency (268 9) (E55 9)    Past Medical History    1  History of Abnormal weight gain (783 1) (R63 5)   2  History of Cough (786 2) (R05)   3  History of Dysfunction of left Eustachian tube (381 81) (H69 82)   4  History of Dysfunction of right Eustachian tube (381 81) (H69 81)    Surgical History    1  History of  Section   2  History of Complete Colonoscopy   3  History of Gallbladder Surgery   4  History of Tonsillectomy With Adenoidectomy   5  History of Tubal Ligation   6  History of Vaginal Hysterectomy    Family History    1  Family history of aortic stenosis (V17 49) (Z82 49)   2  Family history of heart murmur (V17 49) (Z84 89)   3   Family history of Hypertension, benign    4  Family history of Benign Essential Hypertension   5  Family history of Coronary Artery Stenosis    Social History    · Being A Social Drinker   · Former smoker (D17 25) (K39 048)   · Single    Current Meds   1  Cephalexin 500 MG Oral Capsule; TAKE 1 CAPSULE EVERY 6 HOURS DAILY; Therapy: 58OTR4935 to (Evaluate:20Jun2016)  Requested for: 35CTP5486; Last   Rx:13Jun2016 Ordered   2  Losartan Potassium-HCTZ 50-12 5 MG Oral Tablet; TAKE 1 TABLET DAILY; Therapy: 53SBY7043 to (Evaluate:24Feb2017)  Requested for: 08PKV1986; Last   Rx:01Mar2016 Ordered   3  Multivitamins TABS; Therapy: (Recorded:04Hhu0104) to Recorded   4  Venlafaxine HCl  MG Oral Tablet Extended Release 24 Hour; Take 1 tablet daily; Therapy: 34SWF7683 to (Evaluate:24Feb2017)  Requested for: 83PHC4950; Last   Rx:01Mar2016 Ordered   5  Vitamin B-12 1000 MCG Oral Tablet; TAKE 1 TABLET DAILY AS DIRECTED; Therapy: 83MPZ6099 to (Evaluate:23Jan2016) Recorded   6  Vitamin D-3 5000 UNIT TABS; Therapy: (Recorded:22Feb2013) to Recorded    Allergies    1  ACE Inhibitors    Vitals  Signs [Data Includes: Current Encounter]   Recorded: 09NFW7106 01:18PM   Temperature: 98 5 F, Tympanic  Heart Rate: 72, R Radial  Pulse Quality: Normal, R Radial  Respiration: 18  Respiration Quality: Normal  Systolic: 997, RUE, Sitting  Diastolic: 72, RUE, Sitting  Height: 5 ft 2 in  Weight: 195 lb   BMI Calculated: 35 67  BSA Calculated: 1 89  Pain Scale: 3    Physical Exam    Wound #1 Assessment wound #1 Location:, right lower leg, started on 6/2/16, Care for this wound started on 6/13/16  Wound Status: not healed     Length: 1 5cm x Width: 6cm x Depth: 1 4cm   Total: 9sq cm   Wound Volume: 12 6cm3       Undermining 2 5cm from 2 o'clock to 6 o'clock      Tissue type: Subcutaneous, Granulation, Slough and unable to visualize wound bed   Color of Wound: Yellow - 15% and Pink - 85%   Exudate Amount: Large   Exudate Type: Serosangiunous and Bloody drainage   Odor: None   Exudate Color: bloody   Wound Edges: Intact   Periwound Skin Condition: Intact, Erythematous, Macerated, Edema      Procedure      Wound #1: right lower extremity     Nurse Dressing Change:   Wound #wound one The wound located on the RLE  Wound care rendered as per Physician/Advanced Practitioner order/plan  Return to [address] [place] [address] On 2016 for f/u with Dr Hanh Bosch     Comments:, irrigated with 40cc NSS and then redressed with AMD packing strip loosely to depth of undermining and wound bed and taped in place  Calmoseptine appplied to periwound skin  Covered with 4 x 4, ABD and secured with natalie and tape  4x10 acer wrap applied toes to knee followed by tubigrip F  Wound Drsg  Orders/Instructions  Wound Identification Dressing Orders--Instructions:   Wound Identification and Instructions   Wound #1: right lower leg    Wound Care Instructions  Discussed with Patient/Caregiver  Dressing Type: AMD  Wash with mild soap and water, normal saline, wound cleanser  As specified, use: Irrigate with NSS  Apply specified dressing to wound base/bed  To periwound apply: Calmoseptine  If using packing in a tunnel, secure tail to skin with tape   Secondary dressing apply: Gauze, 4 x 4  ABD  Secure with: Natalie, tape  Dressing change frequency: Three times per week, at [facility]  Comments/Other: Ace wrap (4" x 10') base of toes to knee followed by Tubigrip F   Apply compression usin" Ace Wrap and Tubigrip F  Wound Goals  Wound Goals:   Healing Goals:   Good healing potential    Wound base will be 25%    Patient will achieve full wound closure and return to full ADLs       Impression  Wound [address]:    Wound Nursing Care Plan   Impaired Tissue Integrity related to:   Knowledge Deficit Related To:   Risk for Infection related to open wound:   Pain related to disease process and/or wound treatment:   Imbalanced Nutrition, less than body requirements related to inadequate intake and/or disease process: inadequate protein intake   Risk for Fall related to criteria noted on Fall Risk Assessment:   Goals   Patient will achieve 100% epithelialization:  Patient will demonstrate and verbalize knowledge of their disease process and management:  Patient will verbalize signs and symptoms of infection and when to notify physician or [facility]:    Patient will demonstrate and verbalize infection control and preventative measures:  Patient will report adequate pain management:  Patient will verbalize knowledge of and demonstrate adherence to interventions to achieve optimal nutrition required for wound healing:  Patient will not experience a fall:  Patient will maintain or achieve adequate hydration: and nutrition  Wound Nursing Care Interventions:   Provide moist wound healing:  Evaluate current medication regime for medications which may slow/impede wound healing:  Teach patient and/or family about disease process and management methods:  Teach patient and/or family about infection control measures (gloving, hand sanitation, MDROs, disposal of soiled dressings, antibiotic therapy): Lazaro Monaco Assess pain and patients pain tolerance level:  Evaluate effectiveness of pain management interventions:  Assess patient's nutrition on each wound care visit (including protein and fluid intake): Lazaro Monaco Educate on diet and hydration required to enhance wound healing:  Teach patient and caregiver how to change wound dressing:  Complete Fall Risk Assessment upon admission to wound program and with change in condition/implement identified interventions:    Elevate legs above heart 30 minutes 3 times a day, walk 30 minutes daily, reduced sodium diet, diuretics as ordered, wear compression hose or wrap as ordered:     Other:  Care plan initiated 6/13/16      Future Appointments    Date/Time Provider Specialty Site   06/20/2016 02:15 PM MD Vangie Chaves [address] Floor WOUND CARE   56/28/1109 25:84 PM Liana Lara, DO Family Medicine [facility]     Signatures   Electronically signed by : Oniel Ruiz RN; Jun 17 2016  1:50PM EST                       (Author)

## 2018-01-18 NOTE — PROGRESS NOTES
Assessment    1  Benign essential hypertension (401 1) (I10)   2  Thyroid disorder (246 9) (E07 9)   3  Need for hepatitis B screening test (V73 89) (Z11 59)   4  Encounter for preventive health examination (V70 0) (Z00 00)    Plan  Benign essential hypertension    · (Q) LIPID PANEL WITH DIRECT LDL; Status:Active; Requested for:01Mar2017;   Benign essential hypertension, Need for hepatitis B screening test, Nontoxic single  thyroid nodule    · (Q) HEPATITIS PANEL, ACUTE W/REFLEX TO CONFIRMATION; Status:Active; Requested for:01Mar2017;   Benign essential hypertension, Nontoxic single thyroid nodule    · (Q) COMPREHENSIVE METABOLIC PANEL W/O ALT; Status:Active; Requested  for:01Mar2017;    · (Q) TSH, 3RD GENERATION; Status:Active; Requested for:01Mar2017;   Knee pain, left    · Meloxicam 7 5 MG Oral Tablet    Discussion/Summary  health maintenance visit Currently, she eats a poor diet and has an inadequate exercise regimen  cervical cancer screening is not indicated Breast cancer screening: mammogram has been ordered  Colorectal cancer screening: colorectal cancer screening is current and fecal occult blood testing supplies were given  Osteoporosis screening: bone mineral density testing is current  Screening lab work includes hemoglobin, glucose, lipid profile, thyroid function testing and 25-hydroxyvitamin D  Advice and education were given regarding nutrition, aerobic exercise, weight loss, calcium supplements, vitamin D supplements, sunscreen use and self skin examination  Patient discussion: discussed with the patient, 25-30 minute visit, greater than half of the time was spent on counseling  Chief Complaint  Annual PE, Pt wants to discuss symptoms  History of Present Illness  HM, Adult Female: The patient is being seen for a health maintenance evaluation  The last health maintenance visit was 1 year(s) ago  Social History: Household members include self  She is    Work status: working full time and occupation:    The patient is a former cigarette smoker and quit smoking 27 years ago  She reports occasional alcohol use  She has never used illicit drugs  General Health: The patient's health since the last visit is described as good  She has regular dental visits  She complains of vision problems  Vision care includes wearing glasses and an eye examination within the last year  Immunizations status: not up to date  Lifestyle:  She consumes a diverse and healthy diet  She is on a diet and is generally adherent  She exercises regularly  She exercises less than three times a week  Reproductive health: the patient is postmenopausal   she is not sexually active  pregnancy history: G 2   hyster TOTAL on hormones for  Screening:   Hypertension (Follow-Up): The patient presents for follow-up of essential hypertension  The patient states she has been stable with her blood pressure control since the last visit  Interval Events: fall work up done by cariology  Symptoms:   Home monitoring: The patient checks her blood pressure sporadically  Lifestyle: Diet: She is on a diet but does not adhere to the diet  Exercise: She does not exercise regularly  Medications: the patient is adherent with her medication regimen  Review of Systems    Constitutional: feeling tired  Eyes: no purulent discharge from the eyes  ENT: no nasal discharge  Cardiovascular: no chest pain and no palpitations  Respiratory: no shortness of breath  Gastrointestinal: no constipation  Genitourinary: no incontinence  Musculoskeletal: arthralgias and back, knees, neck, but as noted in HPI  Neurological: no headache  ROS reviewed  Active Problems    1  Acute upper respiratory infection (465 9) (J06 9)   2  Allergic conjunctivitis (372 14) (H10 10)   3  Benign essential hypertension (401 1) (I10)   4  Cervical radiculopathy (723 4) (M54 12)   5  Change in mental status (780 97) (R41 82)   6  Chest pain (786 50) (R07 9)   7  Depression (311) (F32 9)   8  Encounter for screening colonoscopy (V76 51) (Z12 11)   9  Fatigue (780 79) (R53 83)   10  Hematoma of lower extremity, right, sequela (906 3) (S80 11XS)   11  Influenza-like symptoms (780 99) (R68 89)   12  Irritable bowel syndrome (564 1) (K58 9)   13  Knee pain, left (719 46) (M25 562)   14  Lipoma (214 9) (D17 9)   15  Lower back pain (724 2) (M54 5)   16  Menopausal symptoms (627 2) (N95 1)   17  Neuropathy (355 9) (G62 9)   18  Non-healing surgical wound, initial encounter (998 83) (T81 89XA)   19  Nontoxic single thyroid nodule (241 0) (E04 1)   20  Numbness and tingling of right arm (782 0) (R20 0,R20 2)   21  OCD (obsessive compulsive disorder) (300 3) (F42 9)   22  Open wound of right lower extremity (891 0) (S81 801A)   23  Sinus infection (473 9) (J32 9)   24  Sore throat (462) (J02 9)   25  Syncope (780 2) (R55)   26  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)   27  Thyroid disorder (246 9) (E07 9)   28  Traumatic hematoma of right lower leg (924 10) (S80 11XA)   29  Upper respiratory infection (465 9) (J06 9)   30  Viral syndrome (079 99) (B34 9)   31  Vitamin B12 deficiency (266 2) (E53 8)   32   Vitamin D deficiency (268 9) (E55 9)    Past Medical History    · History of Abnormal weight gain (783 1) (R63 5)   · History of ACE-inhibitor cough (786 2,E942 6) (F48,D13 6M1D)   · History of Cough (786 2) (R05)   · History of Dysfunction of left eustachian tube (381 81) (H69 82)   · History of Dysfunction of right eustachian tube (381 81) (H69 81)   · History of Fall, accidental (E888 9) (S66 YKKD)    Surgical History    · History of  Section   · History of Complete Colonoscopy   · History of Gallbladder Surgery   · History of Tonsillectomy With Adenoidectomy   · History of Tubal Ligation   · History of Vaginal Hysterectomy    Family History  Mother    · Family history of aortic stenosis (V17 49) (Z82 49)   · Family history of heart murmur (V17 49) (Z84 89)   · Family history of Hypertension, benign  Family History    · Family history of Benign Essential Hypertension   · Family history of Coronary Artery Stenosis    Social History    · Being A Social Drinker   ·    · Former smoker (V15 82) (P88 462)   · quit 1990   · Single    Current Meds   1  Losartan Potassium 50 MG Oral Tablet; Take 1 tablet daily; Therapy: 71WSN3918 to (Evaluate:14Jan2018)  Requested for: 49RCG6020; Last   Rx:19Jan2017 Ordered   2  Meloxicam 7 5 MG Oral Tablet; TAKE 1 TABLET BY MOUTH EVERY DAY AS NEEDED   FOR PAIN;   Therapy: 06GLX9120 to (Evaluate:28Mar2017)  Requested for: 84Eis0333; Last   Rx:26Feb2017 Ordered   3  Multivitamins TABS; Therapy: (Recorded:17Kpc9474) to Recorded   4  Venlafaxine HCl  MG Oral Tablet Extended Release 24 Hour; Take 1 tablet daily; Therapy: 15DKJ3295 to (Evaluate:24Feb2017)  Requested for: 21TWW1133; Last   Rx:01Mar2016 Ordered   5  Vitamin B-12 1000 MCG Oral Tablet; TAKE 1 TABLET DAILY AS DIRECTED; Therapy: 32YKX4231 to (Evaluate:23Jan2016) Recorded   6  Vitamin C-Tonya Hips ER 1000 MG Oral Tablet Extended Release; TAKE 1 TABLET   DAILY; Therapy: 56MUB9014 to Recorded   7  Vitamin D-3 5000 UNIT TABS; Therapy: (Recorded:57Oiw9380) to Recorded    Allergies    1  ACE Inhibitors    Vitals   Recorded: 26GHD6828 12:56PM Recorded: 80ZPW4671 28:68JM   Systolic 756    Diastolic 72    Height  5 ft 2 5 in   Weight  224 lb    BMI Calculated  40 32   BSA Calculated  2 02     Physical Exam    Constitutional   General appearance: No acute distress, well appearing and well nourished  well developed, obese and appearance reflects stated age  Eyes   Pupils and irises: Equal, round, reactive to light  Ears, Nose, Mouth, and Throat   Otoscopic examination: Tympanic membranes translucent with normal light reflex  Canals patent without erythema  Nasal mucosa, septum, and turbinates: Normal without edema or erythema      Oropharynx: Normal with no erythema, edema, exudate or lesions  Pulmonary   Auscultation of lungs: Clear to auscultation  Cardiovascular   Auscultation of heart: Normal rate and rhythm, normal S1 and S2, no murmurs  Abdomen   Abdomen: Non-tender, no masses  Liver and spleen: No hepatomegaly or splenomegaly  Musculoskeletal   Gait and station: Abnormal   Gait evaluation demonstrated antalgia on the left  Range of motion: Normal     Muscle strength/tone: Normal     Neurologic   Sensation: No sensory loss      Psychiatric   Orientation to person, place, and time: Normal     Mood and affect: Normal        Future Appointments    Date/Time Provider Specialty Site   32/96/6105 46:87 PM John Herr DO Family Medicine [facility]     Signatures   Electronically signed by : Clau Morales DO; Mar  2 5119 10:38AM EST                       (Author)

## 2018-01-18 NOTE — MISCELLANEOUS
Physical Exam    Wound #1 Assessment wound #1 Location:, right lower leg, started on 16, Care for this wound started on 16  Wound Status: not healed  Length: 1 5cm x Width: 6cm x Depth: 1 4cm   Total: 9sq cm   Wound Volume: 12 6cm3       Undermining 2 5cm from 2 o'clock to 6 o'clock      Tissue type: Subcutaneous, Granulation, Slough and unable to visualize wound bed   Color of Wound: Yellow - 15% and Pink - 85%   Exudate Amount: Large   Exudate Type: Serosangiunous and Bloody drainage   Odor: None   Exudate Color: bloody   Wound Edges: Intact   Periwound Skin Condition: Intact, Erythematous, Macerated, Edema      Wound Drsg  Orders/Instructions  Wound Identification Dressing Orders--Instructions:   Wound Identification and Instructions   Wound #1: right lower leg    Wound Care Instructions  Discussed with Patient/Caregiver  Dressing Type: AMD  Wash with mild soap and water, normal saline, wound cleanser  As specified, use: Irrigate with NSS  Apply specified dressing to wound base/bed  To periwound apply: Calmoseptine  If using packing in a tunnel, secure tail to skin with tape   Secondary dressing apply: Gauze, 4 x 4  ABD  Secure with: Natalie, tape  Dressing change frequency: Three times per week, at [facility]  Comments/Other: Ace wrap (4" x 10') base of toes to knee followed by Tubigrip F   Apply compression usin" Ace Wrap and Tubigrip F  Future Appointments    Date/Time Provider Specialty Site   2016 02:15 PM Sejal Morejon MD General Surgery  [address] CARE    77:81 PM Quinton Thomas DO Family Medicine T[facility] HEALTH     [address] Plan  Wound Nursing Care Plan [facility]:    Wound Nursing Care Plan   Impaired Tissue Integrity related to:   Knowledge Deficit Related To:   Risk for Infection related to open wound:   Pain related to disease process and/or wound treatment:   Imbalanced Nutrition, less than body requirements related to inadequate intake and/or disease process: inadequate protein intake   Risk for Fall related to criteria noted on Fall Risk Assessment:   Goals   Patient will achieve 100% epithelialization:  Patient will demonstrate and verbalize knowledge of their disease process and management:  Patient will verbalize signs and symptoms of infection and when to notify physician or [facility]:    Patient will demonstrate and verbalize infection control and preventative measures:  Patient will report adequate pain management:  Patient will verbalize knowledge of and demonstrate adherence to interventions to achieve optimal nutrition required for wound healing:  Patient will not experience a fall:  Patient will maintain or achieve adequate hydration: and nutrition  Wound Nursing Care Interventions:   Provide moist wound healing:  Evaluate current medication regime for medications which may slow/impede wound healing:  Teach patient and/or family about disease process and management methods:  Teach patient and/or family about infection control measures (gloving, hand sanitation, MDROs, disposal of soiled dressings, antibiotic therapy): Venkatesh Bagley Assess pain and patients pain tolerance level:  Evaluate effectiveness of pain management interventions:  Assess patient's nutrition on each wound care visit (including protein and fluid intake): Venkatesh Bagley Educate on diet and hydration required to enhance wound healing:  Teach patient and caregiver how to change wound dressing:  Complete Fall Risk Assessment upon admission to wound program and with change in condition/implement identified interventions:    Elevate legs above heart 30 minutes 3 times a day, walk 30 minutes daily, reduced sodium diet, diuretics as ordered, wear compression hose or wrap as ordered:     Other:  Care plan initiated 6/13/16      Signatures   Electronically signed by : Yadiel Quiñones RN; Jun 17 2016  1:50PM EST                       (Author)

## 2018-01-22 VITALS
WEIGHT: 224 LBS | DIASTOLIC BLOOD PRESSURE: 72 MMHG | HEIGHT: 63 IN | SYSTOLIC BLOOD PRESSURE: 132 MMHG | BODY MASS INDEX: 39.69 KG/M2

## 2018-01-23 VITALS
SYSTOLIC BLOOD PRESSURE: 120 MMHG | BODY MASS INDEX: 30.14 KG/M2 | WEIGHT: 170.13 LBS | DIASTOLIC BLOOD PRESSURE: 88 MMHG | HEIGHT: 63 IN

## 2018-01-31 DIAGNOSIS — M54.50 ACUTE BILATERAL LOW BACK PAIN WITHOUT SCIATICA: Primary | ICD-10-CM

## 2018-01-31 RX ORDER — METHOCARBAMOL 500 MG/1
TABLET, FILM COATED ORAL
Qty: 60 TABLET | Refills: 0 | Status: SHIPPED | OUTPATIENT
Start: 2018-01-31 | End: 2018-06-19 | Stop reason: ALTCHOICE

## 2018-02-26 NOTE — MISCELLANEOUS
Message  Return to work or school:   Alexi Rodriguez is under my professional care   She was seen in my office on 01/11/2018   She is able to return to work on  01/15/2018       Joel Grey DO/am       Signatures   Electronically signed by : Yoel Munoz, ; Jan 11 2018  2:34PM EST                       (Author)

## 2018-03-01 RX ORDER — LANOLIN ALCOHOL/MO/W.PET/CERES
1 CREAM (GRAM) TOPICAL DAILY
COMMUNITY
Start: 2015-07-27 | End: 2018-06-19 | Stop reason: ALTCHOICE

## 2018-03-01 RX ORDER — VENLAFAXINE HYDROCHLORIDE 150 MG/1
1 CAPSULE, EXTENDED RELEASE ORAL DAILY
COMMUNITY
Start: 2016-12-18 | End: 2018-03-06 | Stop reason: SDUPTHER

## 2018-03-01 RX ORDER — LOSARTAN POTASSIUM 50 MG/1
1 TABLET ORAL DAILY
COMMUNITY
Start: 2016-07-26 | End: 2018-03-06 | Stop reason: SDUPTHER

## 2018-03-06 ENCOUNTER — OFFICE VISIT (OUTPATIENT)
Dept: FAMILY MEDICINE CLINIC | Facility: CLINIC | Age: 63
End: 2018-03-06
Payer: COMMERCIAL

## 2018-03-06 VITALS
HEIGHT: 62 IN | DIASTOLIC BLOOD PRESSURE: 72 MMHG | WEIGHT: 176.6 LBS | BODY MASS INDEX: 32.5 KG/M2 | SYSTOLIC BLOOD PRESSURE: 128 MMHG

## 2018-03-06 DIAGNOSIS — I10 BENIGN ESSENTIAL HYPERTENSION: ICD-10-CM

## 2018-03-06 DIAGNOSIS — Z12.39 SCREENING FOR MALIGNANT NEOPLASM OF BREAST: ICD-10-CM

## 2018-03-06 DIAGNOSIS — E04.1 NONTOXIC SINGLE THYROID NODULE: ICD-10-CM

## 2018-03-06 DIAGNOSIS — F41.9 ANXIETY: ICD-10-CM

## 2018-03-06 DIAGNOSIS — Z00.00 HEALTH CARE MAINTENANCE: Primary | ICD-10-CM

## 2018-03-06 PROCEDURE — 99396 PREV VISIT EST AGE 40-64: CPT | Performed by: FAMILY MEDICINE

## 2018-03-06 RX ORDER — LOSARTAN POTASSIUM 50 MG/1
50 TABLET ORAL DAILY
Qty: 90 TABLET | Refills: 3 | Status: SHIPPED | OUTPATIENT
Start: 2018-03-06 | End: 2019-07-03 | Stop reason: SDUPTHER

## 2018-03-06 RX ORDER — VENLAFAXINE HYDROCHLORIDE 150 MG/1
150 CAPSULE, EXTENDED RELEASE ORAL DAILY
Qty: 90 CAPSULE | Refills: 3 | Status: SHIPPED | OUTPATIENT
Start: 2018-03-06 | End: 2018-03-26 | Stop reason: SDUPTHER

## 2018-03-06 NOTE — PROGRESS NOTES
Assessment/Plan:   Diagnoses and all orders for this visit:    Health care maintenance    Screening for malignant neoplasm of breast  -     Mammo diagnostic bilateral w cad; Future    Anxiety  -     venlafaxine (EFFEXOR-XR) 150 mg 24 hr capsule; Take 1 capsule (150 mg total) by mouth daily    Benign essential hypertension  -     losartan (COZAAR) 50 mg tablet; Take 1 tablet (50 mg total) by mouth daily  -     Comprehensive metabolic panel; Future  -     Lipid Panel with Direct LDL reflex; Future    Nontoxic single thyroid nodule  -     US thyroid; Future  -     TSH, 3rd generation; Future    Other orders  -     Discontinue: losartan (COZAAR) 50 mg tablet; Take 1 tablet by mouth daily   -     Discontinue: venlafaxine (EFFEXOR-XR) 150 mg 24 hr capsule; Take 1 capsule by mouth daily  -     Multiple Vitamin (MULTIVITAMINS PO); Take by mouth  -     cyanocobalamin (VITAMIN B-12) 1,000 mcg tablet; Take 1 tablet by mouth daily  -     Ascorbic Acid (VITAMIN C-ANNA HIPS ER) 1000 MG TBCR; Take 1 tablet by mouth daily           patient instructed to follow through with the"wellness visit for adult" patient's instruction  is in "my chart" outlining tips for healthy lifestyle  Subjective:     CMP from December 2017 is in range  Hepatitis screen negative  Excellent lipid profile with the total cholesterol of 132, HDL of 52, triglycerides 93, and LDL 62  TSH was normal     Patient also had an updated thyroid ultrasound at [address] [place] [address]  There was no comparison as patient has had previous ones done at a separate facility  The left upper pole nodule has not grown significantly from last year  This year's measurements are 3 4 x 1 4 x 1 7 cm  Last year's measurements were 3 1 x 1 3 x 1 6  Patient ID: Brian Rubi is a 65 y o  female  52-year-old pleasant white female is here for well checkup  Unfortunately her back is still bothering her somewhat  She is not on any narcotics but is using muscle relaxants    She is in the busy tax season so she is limited in stretching and exercising as her days are long sitting in front of the computer at a desk  We reviewed her lumbar MRI from last year and there really isn't any significant spinal stenosis of the lumbar spine  There is disc degeneration osteophytes and some foraminal narrowing but no significant findings that would warrant surgery  She has had back injections in the past and will consider this as well as Pt  Patient is compliant with meds and blood pressure is well controlled  The following portions of the patient's history were reviewed and updated as appropriate: allergies, current medications, past family history, past medical history, past social history, past surgical history and problem list       Review of Systems   Constitutional: Positive for appetite change (  Patient isTrying to lose weight)  HENT: Negative  Eyes: Negative for pain  Respiratory: Negative  Cardiovascular: Negative  Gastrointestinal: Negative  Genitourinary: Negative  Musculoskeletal: Positive for arthralgias and back pain ( as discussed in HPI)  Neurological: Negative  Patient denies any neuropathy   Psychiatric/Behavioral: Negative  Objective:  /72   Ht 5' 1 5" (1 562 m)   Wt 80 1 kg (176 lb 9 6 oz)   BMI 32 83 kg/m²        Physical Exam   Eyes: Pupils are equal, round, and reactive to light  Neck: Normal range of motion  Neck supple  Cardiovascular: Normal rate and regular rhythm  Pulmonary/Chest: Effort normal and breath sounds normal    Abdominal: Soft  Bowel sounds are normal    Musculoskeletal:   Patient is uncomfortable in the supine position and needs to bend her knees to take the pressure off the pain in her low back  Skin: Skin is warm  Psychiatric: She has a normal mood and affect  Her behavior is normal  Judgment and thought content normal        She appears well, in no apparent distress    Alert and oriented times three, pleasant and cooperative  Vital signs are as documented in vital signs section  BMI is elevated at 32% although she has decreased her weight over the last year

## 2018-03-06 NOTE — PATIENT INSTRUCTIONS

## 2018-03-26 DIAGNOSIS — F41.9 ANXIETY: ICD-10-CM

## 2018-03-26 RX ORDER — VENLAFAXINE HYDROCHLORIDE 150 MG/1
CAPSULE, EXTENDED RELEASE ORAL
Qty: 90 CAPSULE | Refills: 0 | Status: SHIPPED | OUTPATIENT
Start: 2018-03-26 | End: 2019-06-20 | Stop reason: SDUPTHER

## 2018-06-18 ENCOUNTER — ANESTHESIA EVENT (OUTPATIENT)
Dept: PERIOP | Facility: HOSPITAL | Age: 63
DRG: 470 | End: 2018-06-18
Payer: COMMERCIAL

## 2018-06-18 RX ORDER — SODIUM CHLORIDE 9 MG/ML
125 INJECTION, SOLUTION INTRAVENOUS CONTINUOUS
Status: CANCELLED | OUTPATIENT
Start: 2018-07-10 | End: 2019-07-10

## 2018-06-18 NOTE — ANESTHESIA PREPROCEDURE EVALUATION
Review of Systems/Medical History  Patient summary reviewed  Chart reviewed  No history of anesthetic complications     Cardiovascular  Hypertension controlled,    Pulmonary  Negative pulmonary ROS        GI/Hepatic  Negative GI/hepatic ROS          Negative  ROS        Endo/Other  Negative endo/other ROS      GYN  Negative gynecology ROS          Hematology  Negative hematology ROS      Musculoskeletal    Arthritis     Neurology  Negative neurology ROS      Psychology   Anxiety,              Physical Exam    Airway    Mallampati score: II  TM Distance: >3 FB  Neck ROM: full     Dental   No notable dental hx     Cardiovascular  Rhythm: regular, Rate: normal, Cardiovascular exam normal    Pulmonary  Pulmonary exam normal Breath sounds clear to auscultation,     Other Findings        Anesthesia Plan  ASA Score- 2     Anesthesia Type- spinal with ASA Monitors  Additional Monitors:   Airway Plan:         Plan Factors-    Induction-     Postoperative Plan-     Informed Consent- Anesthetic plan and risks discussed with patient

## 2018-06-19 ENCOUNTER — HOSPITAL ENCOUNTER (OUTPATIENT)
Dept: NON INVASIVE DIAGNOSTICS | Facility: HOSPITAL | Age: 63
Discharge: HOME | End: 2018-06-19
Attending: ORTHOPAEDIC SURGERY
Payer: COMMERCIAL

## 2018-06-19 ENCOUNTER — TRANSCRIBE ORDERS (OUTPATIENT)
Dept: ADMINISTRATIVE | Facility: HOSPITAL | Age: 63
End: 2018-06-19

## 2018-06-19 ENCOUNTER — APPOINTMENT (OUTPATIENT)
Dept: PREADMISSION TESTING | Facility: HOSPITAL | Age: 63
End: 2018-06-19
Payer: COMMERCIAL

## 2018-06-19 ENCOUNTER — HOSPITAL ENCOUNTER (OUTPATIENT)
Dept: RADIOLOGY | Facility: HOSPITAL | Age: 63
Discharge: HOME | End: 2018-06-19
Attending: ORTHOPAEDIC SURGERY
Payer: COMMERCIAL

## 2018-06-19 ENCOUNTER — APPOINTMENT (OUTPATIENT)
Dept: LAB | Facility: HOSPITAL | Age: 63
End: 2018-06-19
Attending: ORTHOPAEDIC SURGERY
Payer: COMMERCIAL

## 2018-06-19 DIAGNOSIS — Z01.818 PREOP EXAMINATION: ICD-10-CM

## 2018-06-19 DIAGNOSIS — Z01.818 PREOP EXAMINATION: Primary | ICD-10-CM

## 2018-06-19 DIAGNOSIS — M16.11 PRIMARY OSTEOARTHRITIS OF RIGHT HIP: ICD-10-CM

## 2018-06-19 LAB
ALBUMIN SERPL BCP-MCNC: 3.7 G/DL (ref 3.5–5)
ALP SERPL-CCNC: 76 U/L (ref 46–116)
ALT SERPL W P-5'-P-CCNC: 25 U/L (ref 12–78)
ANION GAP SERPL CALCULATED.3IONS-SCNC: 8 MMOL/L (ref 4–13)
AST SERPL W P-5'-P-CCNC: 18 U/L (ref 5–45)
ATRIAL RATE: 67 BPM
ATRIAL RATE: 71 BPM
BASOPHILS # BLD AUTO: 0.01 THOUSANDS/ΜL (ref 0–0.1)
BASOPHILS NFR BLD AUTO: 0 % (ref 0–1)
BILIRUB SERPL-MCNC: 0.53 MG/DL (ref 0.2–1)
BILIRUB UR QL STRIP: NEGATIVE
BUN SERPL-MCNC: 21 MG/DL (ref 5–25)
CALCIUM SERPL-MCNC: 9.3 MG/DL (ref 8.3–10.1)
CHLORIDE SERPL-SCNC: 102 MMOL/L (ref 100–108)
CLARITY UR: CLEAR
CO2 SERPL-SCNC: 28 MMOL/L (ref 21–32)
COLOR UR: YELLOW
CREAT SERPL-MCNC: 0.63 MG/DL (ref 0.6–1.3)
EOSINOPHIL # BLD AUTO: 0.03 THOUSAND/ΜL (ref 0–0.61)
EOSINOPHIL NFR BLD AUTO: 1 % (ref 0–6)
ERYTHROCYTE [DISTWIDTH] IN BLOOD BY AUTOMATED COUNT: 14.4 % (ref 11.6–15.1)
GFR SERPL CREATININE-BSD FRML MDRD: 97 ML/MIN/1.73SQ M
GLUCOSE SERPL-MCNC: 94 MG/DL (ref 65–140)
GLUCOSE UR STRIP-MCNC: NEGATIVE MG/DL
HCT VFR BLD AUTO: 40.1 % (ref 34.8–46.1)
HGB BLD-MCNC: 13.3 G/DL (ref 11.5–15.4)
HGB UR QL STRIP.AUTO: NEGATIVE
KETONES UR STRIP-MCNC: NEGATIVE MG/DL
LEUKOCYTE ESTERASE UR QL STRIP: NEGATIVE
LYMPHOCYTES # BLD AUTO: 1.77 THOUSANDS/ΜL (ref 0.6–4.47)
LYMPHOCYTES NFR BLD AUTO: 37 % (ref 14–44)
MCH RBC QN AUTO: 27.9 PG (ref 26.8–34.3)
MCHC RBC AUTO-ENTMCNC: 33.2 G/DL (ref 31.4–37.4)
MCV RBC AUTO: 84 FL (ref 82–98)
MONOCYTES # BLD AUTO: 0.41 THOUSAND/ΜL (ref 0.17–1.22)
MONOCYTES NFR BLD AUTO: 9 % (ref 4–12)
NEUTROPHILS # BLD AUTO: 2.56 THOUSANDS/ΜL (ref 1.85–7.62)
NEUTS SEG NFR BLD AUTO: 54 % (ref 43–75)
NITRITE UR QL STRIP: NEGATIVE
P AXIS: 101 DEGREES
P AXIS: 42 DEGREES
PH UR STRIP.AUTO: 7 [PH] (ref 4.5–8)
PLATELET # BLD AUTO: 133 THOUSANDS/UL (ref 149–390)
PMV BLD AUTO: 12.2 FL (ref 8.9–12.7)
POTASSIUM SERPL-SCNC: 3.8 MMOL/L (ref 3.5–5.3)
PR INTERVAL: 144 MS
PR INTERVAL: 152 MS
PROT SERPL-MCNC: 7 G/DL (ref 6.4–8.2)
PROT UR STRIP-MCNC: NEGATIVE MG/DL
QRS AXIS: 0 DEGREES
QRS AXIS: 6 DEGREES
QRSD INTERVAL: 82 MS
QRSD INTERVAL: 84 MS
QT INTERVAL: 388 MS
QT INTERVAL: 412 MS
QTC INTERVAL: 421 MS
QTC INTERVAL: 435 MS
RBC # BLD AUTO: 4.77 MILLION/UL (ref 3.81–5.12)
SODIUM SERPL-SCNC: 138 MMOL/L (ref 136–145)
SP GR UR STRIP.AUTO: 1.01 (ref 1–1.03)
T WAVE AXIS: 30 DEGREES
T WAVE AXIS: 43 DEGREES
UROBILINOGEN UR QL STRIP.AUTO: 0.2 E.U./DL
VENTRICULAR RATE: 67 BPM
VENTRICULAR RATE: 71 BPM
WBC # BLD AUTO: 4.78 THOUSAND/UL (ref 4.31–10.16)

## 2018-06-19 PROCEDURE — 93005 ELECTROCARDIOGRAM TRACING: CPT

## 2018-06-19 PROCEDURE — 93010 ELECTROCARDIOGRAM REPORT: CPT | Performed by: INTERNAL MEDICINE

## 2018-06-19 PROCEDURE — 36415 COLL VENOUS BLD VENIPUNCTURE: CPT

## 2018-06-19 PROCEDURE — 81003 URINALYSIS AUTO W/O SCOPE: CPT | Performed by: ORTHOPAEDIC SURGERY

## 2018-06-19 PROCEDURE — 85025 COMPLETE CBC W/AUTO DIFF WBC: CPT

## 2018-06-19 PROCEDURE — 80053 COMPREHEN METABOLIC PANEL: CPT

## 2018-06-19 PROCEDURE — 71046 X-RAY EXAM CHEST 2 VIEWS: CPT

## 2018-06-19 RX ORDER — IBUPROFEN 200 MG
200 TABLET ORAL EVERY 6 HOURS PRN
COMMUNITY
End: 2018-07-13 | Stop reason: HOSPADM

## 2018-06-19 RX ORDER — VITAMIN B COMPLEX
1 CAPSULE ORAL DAILY
COMMUNITY
End: 2021-10-04

## 2018-06-19 RX ORDER — MAGNESIUM 200 MG
200 TABLET ORAL DAILY
COMMUNITY
End: 2021-10-04

## 2018-06-19 NOTE — PRE-PROCEDURE INSTRUCTIONS
Pre-Surgery Instructions:   Medication Instructions    Ascorbic Acid (VITAMIN C-ANNA HIPS ER) 1000 MG TBCR Patient was instructed by Physician and understands   b complex vitamins capsule Patient was instructed by Physician and understands   Cholecalciferol (VITAMIN D PO) Patient was instructed by Physician and understands   ibuprofen (ADVIL) 200 mg tablet Patient was instructed by Physician and understands   losartan (COZAAR) 50 mg tablet Patient was instructed by Physician and understands   Magnesium 200 MG TABS Patient was instructed by Physician and understands   venlafaxine (EFFEXOR-XR) 150 mg 24 hr capsule Patient was instructed by Physician and understands  Pt instructed to take her losartan and effexor with a sip of water the morning of surgery  Pt given [facility] preop instructions and reviewed with pt  Pt given Chlorhexidine

## 2018-06-27 ENCOUNTER — OFFICE VISIT (OUTPATIENT)
Dept: FAMILY MEDICINE CLINIC | Facility: CLINIC | Age: 63
End: 2018-06-27
Payer: COMMERCIAL

## 2018-06-27 DIAGNOSIS — M16.10 PRIMARY LOCALIZED OSTEOARTHRITIS OF PELVIC REGION AND THIGH: ICD-10-CM

## 2018-06-27 DIAGNOSIS — I10 BENIGN ESSENTIAL HYPERTENSION: ICD-10-CM

## 2018-06-27 DIAGNOSIS — Z01.818 PRE-OP EVALUATION: Primary | ICD-10-CM

## 2018-06-27 NOTE — PROGRESS NOTES
Assessment/Plan     Diagnoses and all orders for this visit:    Pre-op evaluation  Comments:  [facility] scheduled for July 10, 2018  Surgeon Dr Buddy Abdullahi with anesthesia as determined  Primary localized osteoarthritis of RIGHT HIP    Benign essential hypertension        Patient's pre-admission testing reviewed and patient is medically cleared for planned procedure  She is to take her morning medication with sips of water  Advised that she should have some inpatient physical therapy postoperatively as she lives alone and has no one to assist her there  Subjective:   Chief Complaint   Patient presents with    Pre-op Exam     R hip replaced on 8/54/0531 by Dr Garcia Mireles - PAT's are all done and in chart      Patient ID: Suzi Rubi is a 65 y o  female  Does not 61-year-old female who is here for preop consultation prior to total hip replacement right side  The following portions of the patient's history were reviewed and updated as appropriate: allergies, current medications, past family history, past medical history, past social history, past surgical history and problem list       Review of Systems   Constitutional: Positive for activity change (Patient'sExercise is limited due to hip pain)  HENT: Negative  Respiratory: Negative for shortness of breath  Cardiovascular: Negative for chest pain and palpitations  Gastrointestinal: Negative  Genitourinary: Negative  Musculoskeletal:        Moderate limitation due to right hip pain   Hematological: Negative  Does not bruise/bleed easily  Psychiatric/Behavioral: Negative  Objective:  /78   Ht 5' 2" (1 575 m)   Wt 86 8 kg (191 lb 6 4 oz)   BMI 35 01 kg/m²    EKG:  Normal sinus rhythm no acute changes no comparison    Chest x-ray:  Normal       Component      Latest Ref Rng & Units 6/19/2018   WBC      4 31 - 10 16 Thousand/uL 4 78   RBC      3 81 - 5 12 Million/uL 4 77   Hemoglobin      11 5 - 15 4 g/dL 13 3 Hematocrit      34 8 - 46 1 % 40 1   MCV      82 - 98 fL 84   MCH      26 8 - 34 3 pg 27 9   MCHC      31 4 - 37 4 g/dL 33 2   RDW      11 6 - 15 1 % 14 4   MPV      8 9 - 12 7 fL 12 2   Platelets      150 - 390 Thousands/uL 133 (L)   Neutrophils Relative      43 - 75 % 54   Lymphocytes Relative      14 - 44 % 37   Monocytes Relative      4 - 12 % 9   Eosinophils Relative      0 - 6 % 1   Basophils Relative      0 - 1 % 0   Neutrophils Absolute      1 85 - 7 62 Thousands/µL 2 56   Lymphocytes Absolute      0 60 - 4 47 Thousands/µL 1 77   Monocytes Absolute      0 17 - 1 22 Thousand/µL 0 41   Eosinophils Absolute      0 00 - 0 61 Thousand/µL 0 03   Basophils Absolute      0 00 - 0 10 Thousands/µL 0 01   Sodium      136 - 145 mmol/L 138   Potassium      3 5 - 5 3 mmol/L 3 8   Chloride      100 - 108 mmol/L 102   CO2      21 - 32 mmol/L 28   Anion Gap      4 - 13 mmol/L 8   BUN      5 - 25 mg/dL 21   Creatinine      0 60 - 1 30 mg/dL 0 63   Glucose      65 - 140 mg/dL 94   Calcium      8 3 - 10 1 mg/dL 9 3   AST      5 - 45 U/L 18   ALT      12 - 78 U/L 25   Alkaline Phosphatase      46 - 116 U/L 76   Total Protein      6 4 - 8 2 g/dL 7 0   Albumin      3 5 - 5 0 g/dL 3 7   Total Bilirubin      0 20 - 1 00 mg/dL 0 53   eGFR      ml/min/1 73sq m 97   Color, UA       Yellow   Clarity, UA       Clear   Specific [place], UA      1 003 - 1 030 1 010   pH, UA      4 5 - 8 0 7 0   Leukocytes, UA      Negative Negative   Nitrite, UA      Negative Negative   Protein, UA      Negative mg/dl Negative   Glucose, UA      Negative mg/dl Negative   Ketones, UA      Negative mg/dl Negative   Urobilinogen, UA      0 2, 1 0 E U /dl E U /dl 0 2   Bilirubin, UA      Negative Negative   Blood, UA      >=2000 (4+), Trace-Intact, Negative Negative   Borderline platelets noted  No concern  Physical Exam   Constitutional: She is oriented to person, place, and time  She appears well-developed and well-nourished     56-year-old female who appears younger than her stated age with a elevated BMI   HENT:   Head: Normocephalic and atraumatic  Neck: Normal range of motion  Cardiovascular: Normal rate, regular rhythm and intact distal pulses  Pulmonary/Chest: Effort normal and breath sounds normal    Abdominal: Soft  Bowel sounds are normal    Musculoskeletal:   Antalgic gait   Neurological: She is alert and oriented to person, place, and time  Psychiatric: She has a normal mood and affect   Her behavior is normal  Judgment and thought content normal    Appropriately anxious about procedure and postop care

## 2018-06-27 NOTE — LETTER
July 2, 8560     MD Elvis [address]ra [address] [place] [address]    Patient: Sherman Rubi   YOB: 1955   Date of Visit: 6/27/2018       Dear Dr Terrell Merrill: Thank you for referring Adelaide Rubi to me for evaluation  Below are my notes for this consultation  If you have questions, please do not hesitate to call me  I look forward to following your patient along with you  Sincerely,        Roxana Ortiz DO        CC: DO Roxana Condon DO  6/8/6993 87:59 AM  Addendum    Assessment/Plan     Diagnoses and all orders for this visit:    Pre-op evaluation  Comments:  [facility] scheduled for July 10, 2018  Surgeon Dr Kyle Plascencia with anesthesia as determined  Primary localized osteoarthritis of RIGHT HIP    Benign essential hypertension        Patient's pre-admission testing reviewed and patient is medically cleared for planned procedure  She is to take her morning medication with sips of water  Advised that she should have some inpatient physical therapy postoperatively as she lives alone and has no one to assist her there  Subjective:   Chief Complaint   Patient presents with    Pre-op Exam     R hip replaced on 6/46/3420 by Dr Terrell Merrill - PAT's are all done and in chart      Patient ID: Sherman Rubi is a 65 y o  female  Does not 46-year-old female who is here for preop consultation prior to total hip replacement right side  The following portions of the patient's history were reviewed and updated as appropriate: allergies, current medications, past family history, past medical history, past social history, past surgical history and problem list       Review of Systems   Constitutional: Positive for activity change (Patient'sExercise is limited due to hip pain)  HENT: Negative  Respiratory: Negative for shortness of breath  Cardiovascular: Negative for chest pain and palpitations  Gastrointestinal: Negative      Genitourinary: Negative  Musculoskeletal:        Moderate limitation due to right hip pain   Hematological: Negative  Does not bruise/bleed easily  Psychiatric/Behavioral: Negative  Objective:  /78   Ht 5' 2" (1 575 m)   Wt 86 8 kg (191 lb 6 4 oz)   BMI 35 01 kg/m²     EKG:  Normal sinus rhythm no acute changes no comparison    Chest x-ray:  Normal       Component      Latest Ref Rng & Units 6/19/2018   WBC      4 31 - 10 16 Thousand/uL 4 78   RBC      3 81 - 5 12 Million/uL 4 77   Hemoglobin      11 5 - 15 4 g/dL 13 3   Hematocrit      34 8 - 46 1 % 40 1   MCV      82 - 98 fL 84   MCH      26 8 - 34 3 pg 27 9   MCHC      31 4 - 37 4 g/dL 33 2   RDW      11 6 - 15 1 % 14 4   MPV      8 9 - 12 7 fL 12 2   Platelets      790 - 390 Thousands/uL 133 (L)   Neutrophils Relative      43 - 75 % 54   Lymphocytes Relative      14 - 44 % 37   Monocytes Relative      4 - 12 % 9   Eosinophils Relative      0 - 6 % 1   Basophils Relative      0 - 1 % 0   Neutrophils Absolute      1 85 - 7 62 Thousands/µL 2 56   Lymphocytes Absolute      0 60 - 4 47 Thousands/µL 1 77   Monocytes Absolute      0 17 - 1 22 Thousand/µL 0 41   Eosinophils Absolute      0 00 - 0 61 Thousand/µL 0 03   Basophils Absolute      0 00 - 0 10 Thousands/µL 0 01   Sodium      136 - 145 mmol/L 138   Potassium      3 5 - 5 3 mmol/L 3 8   Chloride      100 - 108 mmol/L 102   CO2      21 - 32 mmol/L 28   Anion Gap      4 - 13 mmol/L 8   BUN      5 - 25 mg/dL 21   Creatinine      0 60 - 1 30 mg/dL 0 63   Glucose      65 - 140 mg/dL 94   Calcium      8 3 - 10 1 mg/dL 9 3   AST      5 - 45 U/L 18   ALT      12 - 78 U/L 25   Alkaline Phosphatase      46 - 116 U/L 76   Total Protein      6 4 - 8 2 g/dL 7 0   Albumin      3 5 - 5 0 g/dL 3 7   Total Bilirubin      0 20 - 1 00 mg/dL 0 53   eGFR      ml/min/1 73sq m 97   Color, UA       Yellow   Clarity, UA       Clear   Specific [place], UA      1 003 - 1 030 1 010   pH, UA      4 5 - 8 0 7 0   Leukocytes, UA Negative Negative   Nitrite, UA      Negative Negative   Protein, UA      Negative mg/dl Negative   Glucose, UA      Negative mg/dl Negative   Ketones, UA      Negative mg/dl Negative   Urobilinogen, UA      0 2, 1 0 E U /dl E U /dl 0 2   Bilirubin, UA      Negative Negative   Blood, UA      >=2000 (4+), Trace-Intact, Negative Negative   Borderline platelets noted  No concern  Physical Exam   Constitutional: She is oriented to person, place, and time  She appears well-developed and well-nourished  55-year-old female who appears younger than her stated age with a elevated BMI   HENT:   Head: Normocephalic and atraumatic  Neck: Normal range of motion  Cardiovascular: Normal rate, regular rhythm and intact distal pulses  Pulmonary/Chest: Effort normal and breath sounds normal    Abdominal: Soft  Bowel sounds are normal    Musculoskeletal:   Antalgic gait   Neurological: She is alert and oriented to person, place, and time  Psychiatric: She has a normal mood and affect   Her behavior is normal  Judgment and thought content normal    Appropriately anxious about procedure and postop care

## 2018-07-02 VITALS
SYSTOLIC BLOOD PRESSURE: 128 MMHG | WEIGHT: 191.4 LBS | HEIGHT: 62 IN | BODY MASS INDEX: 35.22 KG/M2 | DIASTOLIC BLOOD PRESSURE: 78 MMHG

## 2018-07-09 RX ORDER — TRANEXAMIC ACID 100 MG/ML
1000 INJECTION, SOLUTION INTRAVENOUS ONCE
Status: CANCELLED | OUTPATIENT
Start: 2018-07-10 | End: 2018-07-10

## 2018-07-10 ENCOUNTER — ANESTHESIA (OUTPATIENT)
Dept: PERIOP | Facility: HOSPITAL | Age: 63
DRG: 470 | End: 2018-07-10
Payer: COMMERCIAL

## 2018-07-10 ENCOUNTER — APPOINTMENT (INPATIENT)
Dept: RADIOLOGY | Facility: HOSPITAL | Age: 63
DRG: 470 | End: 2018-07-10
Payer: COMMERCIAL

## 2018-07-10 ENCOUNTER — HOSPITAL ENCOUNTER (INPATIENT)
Facility: HOSPITAL | Age: 63
LOS: 3 days | Discharge: NURSING, REHAB OR LONG-TERM CARE | DRG: 470 | End: 2018-07-13
Attending: ORTHOPAEDIC SURGERY | Admitting: ORTHOPAEDIC SURGERY
Payer: COMMERCIAL

## 2018-07-10 DIAGNOSIS — M16.10 PRIMARY LOCALIZED OSTEOARTHRITIS OF PELVIC REGION AND THIGH: Primary | ICD-10-CM

## 2018-07-10 LAB
ABO GROUP BLD: NORMAL
BLD GP AB SCN SERPL QL: NEGATIVE
RH BLD: POSITIVE
SPECIMEN EXPIRATION DATE: NORMAL

## 2018-07-10 PROCEDURE — C1776 JOINT DEVICE (IMPLANTABLE): HCPCS | Performed by: ORTHOPAEDIC SURGERY

## 2018-07-10 PROCEDURE — 86850 RBC ANTIBODY SCREEN: CPT | Performed by: ORTHOPAEDIC SURGERY

## 2018-07-10 PROCEDURE — C1713 ANCHOR/SCREW BN/BN,TIS/BN: HCPCS | Performed by: ORTHOPAEDIC SURGERY

## 2018-07-10 PROCEDURE — 97116 GAIT TRAINING THERAPY: CPT

## 2018-07-10 PROCEDURE — 97163 PT EVAL HIGH COMPLEX 45 MIN: CPT

## 2018-07-10 PROCEDURE — G8978 MOBILITY CURRENT STATUS: HCPCS

## 2018-07-10 PROCEDURE — 86901 BLOOD TYPING SEROLOGIC RH(D): CPT | Performed by: ORTHOPAEDIC SURGERY

## 2018-07-10 PROCEDURE — 73501 X-RAY EXAM HIP UNI 1 VIEW: CPT

## 2018-07-10 PROCEDURE — 0SR90JZ REPLACEMENT OF RIGHT HIP JOINT WITH SYNTHETIC SUBSTITUTE, OPEN APPROACH: ICD-10-PCS | Performed by: ORTHOPAEDIC SURGERY

## 2018-07-10 PROCEDURE — G8979 MOBILITY GOAL STATUS: HCPCS

## 2018-07-10 PROCEDURE — 97530 THERAPEUTIC ACTIVITIES: CPT

## 2018-07-10 PROCEDURE — 86900 BLOOD TYPING SEROLOGIC ABO: CPT | Performed by: ORTHOPAEDIC SURGERY

## 2018-07-10 DEVICE — PINNACLE POROCOAT ACETABULAR SHELL SECTOR II 52MM OD
Type: IMPLANTABLE DEVICE | Site: HIP | Status: FUNCTIONAL
Brand: PINNACLE POROCOAT

## 2018-07-10 DEVICE — PINNACLE HIP SOLUTIONS ALTRX POLYETHYLENE ACETABULAR LINER +4 NEUTRAL 36MM ID 52MM OD
Type: IMPLANTABLE DEVICE | Site: HIP | Status: FUNCTIONAL
Brand: PINNACLE ALTRX

## 2018-07-10 DEVICE — PINNACLE CANCELLOUS BONE SCREW 6.5MM X 30MM
Type: IMPLANTABLE DEVICE | Site: HIP | Status: FUNCTIONAL
Brand: PINNACLE

## 2018-07-10 DEVICE — ACTIS DUOFIX HIP PROSTHESIS (FEMORAL STEM 12/14 TAPER CEMENTLESS SIZE 5 HIGH COLLAR)  CE
Type: IMPLANTABLE DEVICE | Site: HIP | Status: FUNCTIONAL
Brand: ACTIS

## 2018-07-10 DEVICE — M-SPEC METAL FEMORAL HEAD 12/14 TAPER DIAMETER 36MM +5: Type: IMPLANTABLE DEVICE | Site: HIP | Status: FUNCTIONAL

## 2018-07-10 DEVICE — PINNACLE CANCELLOUS BONE SCREW 6.5MM X 25MM
Type: IMPLANTABLE DEVICE | Site: HIP | Status: FUNCTIONAL
Brand: PINNACLE

## 2018-07-10 RX ORDER — OXYCODONE HYDROCHLORIDE 5 MG/1
5 TABLET ORAL EVERY 4 HOURS PRN
Status: DISCONTINUED | OUTPATIENT
Start: 2018-07-10 | End: 2018-07-13 | Stop reason: HOSPADM

## 2018-07-10 RX ORDER — ZOLPIDEM TARTRATE 5 MG/1
5 TABLET ORAL
Status: DISCONTINUED | OUTPATIENT
Start: 2018-07-10 | End: 2018-07-11

## 2018-07-10 RX ORDER — EPHEDRINE SULFATE 50 MG/ML
INJECTION, SOLUTION INTRAVENOUS AS NEEDED
Status: DISCONTINUED | OUTPATIENT
Start: 2018-07-10 | End: 2018-07-10 | Stop reason: SURG

## 2018-07-10 RX ORDER — TRANEXAMIC ACID 100 MG/ML
INJECTION, SOLUTION INTRAVENOUS AS NEEDED
Status: DISCONTINUED | OUTPATIENT
Start: 2018-07-10 | End: 2018-07-10 | Stop reason: HOSPADM

## 2018-07-10 RX ORDER — CELECOXIB 200 MG/1
200 CAPSULE ORAL DAILY
Status: DISCONTINUED | OUTPATIENT
Start: 2018-07-10 | End: 2018-07-13 | Stop reason: HOSPADM

## 2018-07-10 RX ORDER — OXYCODONE HYDROCHLORIDE 5 MG/1
5-10 TABLET ORAL EVERY 4 HOURS PRN
Qty: 60 TABLET | Refills: 0 | Status: SHIPPED | OUTPATIENT
Start: 2018-07-10 | End: 2018-07-19 | Stop reason: HOSPADM

## 2018-07-10 RX ORDER — CALCIUM CARBONATE 200(500)MG
1000 TABLET,CHEWABLE ORAL DAILY PRN
Status: DISCONTINUED | OUTPATIENT
Start: 2018-07-10 | End: 2018-07-13 | Stop reason: HOSPADM

## 2018-07-10 RX ORDER — VALSARTAN 80 MG/1
40 TABLET ORAL DAILY
Status: DISCONTINUED | OUTPATIENT
Start: 2018-07-11 | End: 2018-07-13 | Stop reason: HOSPADM

## 2018-07-10 RX ORDER — NALOXONE HYDROCHLORIDE 0.4 MG/ML
0.04 INJECTION, SOLUTION INTRAMUSCULAR; INTRAVENOUS; SUBCUTANEOUS
Status: DISCONTINUED | OUTPATIENT
Start: 2018-07-10 | End: 2018-07-13 | Stop reason: HOSPADM

## 2018-07-10 RX ORDER — LOSARTAN POTASSIUM 50 MG/1
50 TABLET ORAL DAILY
Status: DISCONTINUED | OUTPATIENT
Start: 2018-07-11 | End: 2018-07-10 | Stop reason: CLARIF

## 2018-07-10 RX ORDER — SODIUM CHLORIDE, SODIUM LACTATE, POTASSIUM CHLORIDE, CALCIUM CHLORIDE 600; 310; 30; 20 MG/100ML; MG/100ML; MG/100ML; MG/100ML
100 INJECTION, SOLUTION INTRAVENOUS CONTINUOUS
Status: DISCONTINUED | OUTPATIENT
Start: 2018-07-10 | End: 2018-07-12

## 2018-07-10 RX ORDER — ASPIRIN 81 MG/1
81 TABLET, CHEWABLE ORAL 2 TIMES DAILY
Status: DISCONTINUED | OUTPATIENT
Start: 2018-07-10 | End: 2018-07-13 | Stop reason: HOSPADM

## 2018-07-10 RX ORDER — ONDANSETRON 2 MG/ML
4 INJECTION INTRAMUSCULAR; INTRAVENOUS EVERY 8 HOURS PRN
Status: DISCONTINUED | OUTPATIENT
Start: 2018-07-10 | End: 2018-07-13 | Stop reason: HOSPADM

## 2018-07-10 RX ORDER — ACETAMINOPHEN 325 MG/1
650 TABLET ORAL EVERY 6 HOURS PRN
Status: DISCONTINUED | OUTPATIENT
Start: 2018-07-10 | End: 2018-07-13 | Stop reason: HOSPADM

## 2018-07-10 RX ORDER — OXYCODONE HYDROCHLORIDE 10 MG/1
10 TABLET ORAL EVERY 4 HOURS PRN
Status: DISCONTINUED | OUTPATIENT
Start: 2018-07-10 | End: 2018-07-13 | Stop reason: HOSPADM

## 2018-07-10 RX ORDER — ASCORBIC ACID 500 MG
250 TABLET ORAL DAILY
Status: DISCONTINUED | OUTPATIENT
Start: 2018-07-11 | End: 2018-07-13 | Stop reason: HOSPADM

## 2018-07-10 RX ORDER — DIPHENHYDRAMINE HYDROCHLORIDE 50 MG/ML
50 INJECTION INTRAMUSCULAR; INTRAVENOUS EVERY 6 HOURS PRN
Status: DISCONTINUED | OUTPATIENT
Start: 2018-07-10 | End: 2018-07-11

## 2018-07-10 RX ORDER — TRAMADOL HYDROCHLORIDE 50 MG/1
50 TABLET ORAL EVERY 6 HOURS PRN
Qty: 60 TABLET | Refills: 0 | Status: ON HOLD | OUTPATIENT
Start: 2018-07-10 | End: 2018-07-19

## 2018-07-10 RX ORDER — BISACODYL 10 MG
10 SUPPOSITORY, RECTAL RECTAL DAILY PRN
Status: DISCONTINUED | OUTPATIENT
Start: 2018-07-10 | End: 2018-07-13 | Stop reason: HOSPADM

## 2018-07-10 RX ORDER — TRAMADOL HYDROCHLORIDE 50 MG/1
50 TABLET ORAL EVERY 6 HOURS PRN
Status: DISCONTINUED | OUTPATIENT
Start: 2018-07-10 | End: 2018-07-13 | Stop reason: HOSPADM

## 2018-07-10 RX ORDER — KETOROLAC TROMETHAMINE 30 MG/ML
15 INJECTION, SOLUTION INTRAMUSCULAR; INTRAVENOUS EVERY 6 HOURS PRN
Status: DISPENSED | OUTPATIENT
Start: 2018-07-10 | End: 2018-07-12

## 2018-07-10 RX ORDER — PROPOFOL 10 MG/ML
INJECTION, EMULSION INTRAVENOUS CONTINUOUS PRN
Status: DISCONTINUED | OUTPATIENT
Start: 2018-07-10 | End: 2018-07-10 | Stop reason: SURG

## 2018-07-10 RX ORDER — ONDANSETRON 2 MG/ML
4 INJECTION INTRAMUSCULAR; INTRAVENOUS ONCE
Status: DISCONTINUED | OUTPATIENT
Start: 2018-07-10 | End: 2018-07-10 | Stop reason: HOSPADM

## 2018-07-10 RX ORDER — FENTANYL CITRATE/PF 50 MCG/ML
25 SYRINGE (ML) INJECTION
Status: DISCONTINUED | OUTPATIENT
Start: 2018-07-10 | End: 2018-07-10 | Stop reason: HOSPADM

## 2018-07-10 RX ORDER — SODIUM CHLORIDE 9 MG/ML
125 INJECTION, SOLUTION INTRAVENOUS CONTINUOUS
Status: DISCONTINUED | OUTPATIENT
Start: 2018-07-10 | End: 2018-07-12

## 2018-07-10 RX ORDER — DOCUSATE SODIUM 100 MG/1
100 CAPSULE, LIQUID FILLED ORAL 2 TIMES DAILY
Status: DISCONTINUED | OUTPATIENT
Start: 2018-07-10 | End: 2018-07-13 | Stop reason: HOSPADM

## 2018-07-10 RX ORDER — FENTANYL CITRATE 50 UG/ML
INJECTION, SOLUTION INTRAMUSCULAR; INTRAVENOUS AS NEEDED
Status: DISCONTINUED | OUTPATIENT
Start: 2018-07-10 | End: 2018-07-10 | Stop reason: SURG

## 2018-07-10 RX ORDER — PROPOFOL 10 MG/ML
INJECTION, EMULSION INTRAVENOUS AS NEEDED
Status: DISCONTINUED | OUTPATIENT
Start: 2018-07-10 | End: 2018-07-10 | Stop reason: SURG

## 2018-07-10 RX ORDER — MIDAZOLAM HYDROCHLORIDE 1 MG/ML
INJECTION INTRAMUSCULAR; INTRAVENOUS AS NEEDED
Status: DISCONTINUED | OUTPATIENT
Start: 2018-07-10 | End: 2018-07-10 | Stop reason: SURG

## 2018-07-10 RX ORDER — MORPHINE SULFATE 2 MG/ML
2 INJECTION, SOLUTION INTRAMUSCULAR; INTRAVENOUS EVERY 2 HOUR PRN
Status: DISCONTINUED | OUTPATIENT
Start: 2018-07-10 | End: 2018-07-13 | Stop reason: HOSPADM

## 2018-07-10 RX ORDER — VENLAFAXINE HYDROCHLORIDE 150 MG/1
150 CAPSULE, EXTENDED RELEASE ORAL DAILY
Status: DISCONTINUED | OUTPATIENT
Start: 2018-07-11 | End: 2018-07-13 | Stop reason: HOSPADM

## 2018-07-10 RX ORDER — METHOCARBAMOL 500 MG/1
500 TABLET, FILM COATED ORAL EVERY 6 HOURS PRN
Status: DISCONTINUED | OUTPATIENT
Start: 2018-07-10 | End: 2018-07-13 | Stop reason: HOSPADM

## 2018-07-10 RX ORDER — MAGNESIUM HYDROXIDE 1200 MG/15ML
LIQUID ORAL AS NEEDED
Status: DISCONTINUED | OUTPATIENT
Start: 2018-07-10 | End: 2018-07-10 | Stop reason: HOSPADM

## 2018-07-10 RX ORDER — TETRACAINE HCL 10 MG/ML
INJECTION SUBARACHNOID AS NEEDED
Status: DISCONTINUED | OUTPATIENT
Start: 2018-07-10 | End: 2018-07-10 | Stop reason: SURG

## 2018-07-10 RX ADMIN — ACETAMINOPHEN 650 MG: 325 TABLET, FILM COATED ORAL at 23:36

## 2018-07-10 RX ADMIN — CEFAZOLIN SODIUM 2000 MG: 2 SOLUTION INTRAVENOUS at 08:38

## 2018-07-10 RX ADMIN — SODIUM CHLORIDE: 0.9 INJECTION, SOLUTION INTRAVENOUS at 09:03

## 2018-07-10 RX ADMIN — EPHEDRINE SULFATE 5 MG: 50 INJECTION, SOLUTION INTRAMUSCULAR; INTRAVENOUS; SUBCUTANEOUS at 09:16

## 2018-07-10 RX ADMIN — EPHEDRINE SULFATE 5 MG: 50 INJECTION, SOLUTION INTRAMUSCULAR; INTRAVENOUS; SUBCUTANEOUS at 09:03

## 2018-07-10 RX ADMIN — LIDOCAINE HYDROCHLORIDE 60 MG: 20 INJECTION, SOLUTION INTRAVENOUS at 08:51

## 2018-07-10 RX ADMIN — ASPIRIN 81 MG 81 MG: 81 TABLET ORAL at 18:05

## 2018-07-10 RX ADMIN — FENTANYL CITRATE 25 MCG: 50 INJECTION, SOLUTION INTRAMUSCULAR; INTRAVENOUS at 11:24

## 2018-07-10 RX ADMIN — Medication 1 TABLET: at 14:56

## 2018-07-10 RX ADMIN — FENTANYL CITRATE 25 MCG: 50 INJECTION, SOLUTION INTRAMUSCULAR; INTRAVENOUS at 09:56

## 2018-07-10 RX ADMIN — TRANEXAMIC ACID 1 G: 100 INJECTION, SOLUTION INTRAVENOUS at 08:54

## 2018-07-10 RX ADMIN — EPHEDRINE SULFATE 5 MG: 50 INJECTION, SOLUTION INTRAMUSCULAR; INTRAVENOUS; SUBCUTANEOUS at 09:36

## 2018-07-10 RX ADMIN — OXYCODONE HYDROCHLORIDE 10 MG: 10 TABLET ORAL at 23:36

## 2018-07-10 RX ADMIN — PROPOFOL 50 MG: 10 INJECTION, EMULSION INTRAVENOUS at 08:51

## 2018-07-10 RX ADMIN — OXYCODONE HYDROCHLORIDE 10 MG: 10 TABLET ORAL at 14:57

## 2018-07-10 RX ADMIN — MIDAZOLAM 2 MG: 1 INJECTION INTRAMUSCULAR; INTRAVENOUS at 08:40

## 2018-07-10 RX ADMIN — SODIUM CHLORIDE, SODIUM LACTATE, POTASSIUM CHLORIDE, AND CALCIUM CHLORIDE 100 ML/HR: .6; .31; .03; .02 INJECTION, SOLUTION INTRAVENOUS at 15:00

## 2018-07-10 RX ADMIN — FENTANYL CITRATE 25 MCG: 50 INJECTION, SOLUTION INTRAMUSCULAR; INTRAVENOUS at 11:19

## 2018-07-10 RX ADMIN — PROPOFOL 120 MCG/KG/MIN: 10 INJECTION, EMULSION INTRAVENOUS at 08:51

## 2018-07-10 RX ADMIN — FENTANYL CITRATE 25 MCG: 50 INJECTION, SOLUTION INTRAMUSCULAR; INTRAVENOUS at 09:38

## 2018-07-10 RX ADMIN — TETRACAINE HCL 1.4 ML: 10 INJECTION SUBARACHNOID at 08:47

## 2018-07-10 RX ADMIN — CEFAZOLIN SODIUM 1000 MG: 1 SOLUTION INTRAVENOUS at 23:35

## 2018-07-10 RX ADMIN — KETOROLAC TROMETHAMINE 15 MG: 30 INJECTION, SOLUTION INTRAMUSCULAR at 21:14

## 2018-07-10 RX ADMIN — CELECOXIB 200 MG: 200 CAPSULE ORAL at 14:56

## 2018-07-10 RX ADMIN — FENTANYL CITRATE 50 MCG: 50 INJECTION, SOLUTION INTRAMUSCULAR; INTRAVENOUS at 09:20

## 2018-07-10 RX ADMIN — CEFAZOLIN SODIUM 1000 MG: 1 SOLUTION INTRAVENOUS at 18:05

## 2018-07-10 RX ADMIN — DOCUSATE SODIUM 100 MG: 100 CAPSULE, LIQUID FILLED ORAL at 18:05

## 2018-07-10 RX ADMIN — SODIUM CHLORIDE 125 ML/HR: 0.9 INJECTION, SOLUTION INTRAVENOUS at 06:54

## 2018-07-10 RX ADMIN — OXYCODONE HYDROCHLORIDE 10 MG: 10 TABLET ORAL at 19:45

## 2018-07-10 NOTE — ANESTHESIA PROCEDURE NOTES
Spinal Block    Patient location during procedure: OR  Start time: 7/10/2018 8:30 AM  End time: 7/10/2018 8:47 AM  Reason for block: at surgeon's request and primary anesthetic  Staffing  Anesthesiologist: Jaquan Quinones  Performed: anesthesiologist   Preanesthetic Checklist  Completed: patient identified, site marked, surgical consent, pre-op evaluation, timeout performed, IV checked, risks and benefits discussed and monitors and equipment checked  Spinal Block  Patient position: sitting  Prep: Betadine  Patient monitoring: heart rate, cardiac monitor, continuous pulse ox and frequent blood pressure checks  Approach: midline  Location: L4-5  Injection technique: single-shot  Needle  Needle type: pencil-tip   Needle gauge: 24 G  Needle length: 10 cm  Assessment  Sensory level: T10  Events: cerebrospinal fluid  Injection Assessment:  negative aspiration for heme, no paresthesia on injection and positive aspiration for clear CSF    Post-procedure:  site cleaned  Additional Notes  Spinal performed by Shandra Kaminski first pass without difficulty

## 2018-07-10 NOTE — PLAN OF CARE
Problem: PHYSICAL THERAPY ADULT  Goal: Performs mobility at highest level of function for planned discharge setting  See evaluation for individualized goals  Treatment/Interventions: Functional transfer training, LE strengthening/ROM, Elevations, Therapeutic exercise, Endurance training, Patient/family training, Equipment eval/education, Bed mobility, Gait training, Compensatory technique education, Spoke to nursing  Equipment Recommended: Other (Comment) (has RW at home )       See flowsheet documentation for full assessment, interventions and recommendations  Outcome: Progressing  Prognosis: Good  Problem List: Decreased strength, Decreased range of motion, Decreased endurance, Impaired balance, Decreased mobility, Decreased coordination, Decreased skin integrity, Orthopedic restrictions, Pain (Surgical incision)  Assessment: Patient is a 59 y o female admitted to [address] [place] [address] on 7/10/18 for elective SARAH to right hip  Pt is WBAT and (+) hip precautions  PTA patient was (I) with ADLs and IADLs and funcitonal mobility without need for AD  Patient presents with decreased strength, functional mobility, balance, activity tolerance, increased pain and gait dysfunction and would benefit from skilled PT services to address these deficits to optimize outcomes and increase her safety prior to d/c  Anticipate patient may need rehab secondary to her living alone, large [place] home and FOS to bed and bathroom  Patient is agreeable to rehab at this time but would consider home with home PT dependent on progression of therapy here  Barriers to Discharge: Decreased caregiver support, Inaccessible home environment  Barriers to Discharge Comments: F[place] home, FOS to 2nd level, lives alone  Recommendation: Short-term skilled PT, Home PT (rehab vs home with PT services depending on progression)     PT - OK to Discharge: Yes    See flowsheet documentation for full assessment

## 2018-07-10 NOTE — PHYSICAL THERAPY NOTE
PHYSICAL THERAPY EVALUATION  Note: time entered incorrectly on flowsheet  Time in: 1530  Time out: 1545  Total time: 15 minutes    PT EVALUATION    59 y o     [id number]    Primary osteoarthritis of right hip [M16 11]    Past Medical History:   Diagnosis Date    ACE-inhibitor cough     Last assessed 12/04/15    Anxiety     Arthritis     Chronic pain disorder     Depression     History of transfusion     no adverse reaction    Hypertension     Wears glasses          Past Surgical History:   Procedure Laterality Date     SECTION      CHOLECYSTECTOMY      COLONOSCOPY      JOINT REPLACEMENT Left     hip    TONSILLECTOMY AND ADENOIDECTOMY      TUBAL LIGATION      VAGINAL HYSTERECTOMY Bilateral 2007    nd ooperectomy        07/10/18 1410   Note Type   Note type Eval/Treat   Pain Assessment   Pain Assessment 0-10   Pain Score 4   Pain Type Surgical pain   Pain Location Hip   Pain Orientation Right   Home Living   Type of [address] Multi-level;Bed/bath upstairs;Stairs to enter with rails;Stairs to enter without rails  (One large FÉLIX without rail; 1FOS to second level)   Bathroom Shower/Tub Tub/shower unit   [employer] Raised   Bathroom Equipment Grab bars in shower;Commode   Bathroom Accessibility Accessible   Home Equipment Other (Comment)  (RW, commode)   Prior Function   Level of [place] Independent with ADLs and functional mobility   Lives With Alone   Receives Help From Family   ADL Assistance Independent   IADLs Independent   Falls in the last 6 months 0   Vocational Full time employment   Comments +, works as a    Restrictions/Precautions   [place] Bearing Precautions Per Order Yes   RLE [place] Bearing Per Order WBAT   Other Precautions Fall Risk;Pain;Multiple lines;WBS;THR  ((+) posterior lateral hip precautions)   General   Additional Pertinent History PT consulted with orders: WBAT, activity as tolerated  Patient is a 59 y o female admitted to [address] [place] [address] on 7/10/18 for elective SARAH to right hip  PTA patient was (I) with ADLs and IADLs and funcitonal mobility without need for AD  Family/Caregiver Present No   Cognition   Overall Cognitive Status WFL   Arousal/Participation Alert   Orientation Level Oriented X4   Memory Within functional limits   Following Commands Follows multistep commands without difficulty   Comments Able to state 3/3 hip precautions with minimal cueing   RUE Assessment   RUE Assessment WFL   LUE Assessment   LUE Assessment WFL   RLE Assessment   RLE Assessment X   Strength RLE   R Hip Flexion 3+/5   R Knee Extension 3+/5   R Ankle Dorsiflexion 4/5   R Ankle Plantar Flexion 3+/5   LLE Assessment   LLE Assessment X   Strength LLE   L Hip Flexion 4-/5   L Knee Extension 4-/5   L Ankle Dorsiflexion 4/5   Coordination   Movements are Fluid and Coordinated 0   Coordination and Movement Description Poor gross motor coordination/recruitment of R LE   Sensation WFL   Light Touch   RLE Light Touch Grossly intact   LLE Light Touch Grossly intact   Proprioception   RLE Proprioception Grossly intact   LLE Proprioception Grossly Intact   Bed Mobility   Supine to Sit 4  Minimal assistance   Additional items Assist x 1;HOB elevated; Bedrails; Increased time required;LE management;Verbal cues   Sit to Supine 3  Moderate assistance   Additional items Assist x 1; Increased time required;Verbal cues;LE management;HOB elevated; Bedrails   Additional Comments Verbal cues for maintaining hip precautions   Transfers   Sit to Stand 3  Moderate assistance   Additional items Assist x 1; Increased time required;Verbal cues   Additional Comments VCs for proper positioning and hand placement     Balance   Static Sitting Fair   Dynamic Sitting Fair -   Static Standing Fair -   Dynamic Standing Poor +   Endurance Deficit   Endurance Deficit Yes   Endurance Deficit Description pain and fatigue   Activity Tolerance   Activity Tolerance Patient limited by fatigue;Patient limited by pain  (BP  seated eob 125/78)   Nurse Kate Juan RN ok to see patient   Assessment   Prognosis Good   Problem List Decreased strength;Decreased range of motion;Decreased endurance; Impaired balance;Decreased mobility; Decreased coordination;Decreased skin integrity;Orthopedic restrictions;Pain  (Surgical incision)   Assessment Patient is a 59 y o female admitted to [address] [place] [address] on 7/10/18 for elective SARAH to right hip  Pt is WBAT and (+) hip precautions  PTA patient was (I) with ADLs and IADLs and funcitonal mobility without need for AD  Patient presents with decreased strength, functional mobility, balance, activity tolerance, increased pain and gait dysfunction and would benefit from skilled PT services to address these deficits to optimize outcomes and increase her safety prior to d/c  Anticipate patient may need rehab secondary to her living alone, large FÉLIX home and FOS to bed and bathroom  Patient is agreeable to rehab at this time but would consider home with home PT dependent on progression of therapy here  Barriers to Discharge Decreased caregiver support; Inaccessible home environment   Barriers to Discharge Comments FÉLIX home, FOS to 2nd level, lives alone   Goals   Patient Goals to get stronger   LTG Expiration Date 07/20/18   Long Term Goal #1 10 days: Patient will demonstrate bed mobility at mod (I) level in order to be able to get in and out of bed at home without assistance  Patient will demonstrate functional transfers at mod (I) level in order to be able to perform at home without assistance  Patient will ambulate 300ft with RW at mod (I) level in order to ambulate at community level  Patient will negotiate stairs at mod (I) level in order to be able to access 2nd level of  home without assistance   Patient will increase strength and balance by 1 grade to increase stability with gait and decrease risk of falling  Patient will increase activity tolerance to 45 minutes in order to be able to participate with functional tasks  Pt will be able to verbalize and teach back posterior lateral hip precautions w/o cueing to optimize safety and decrease risk of injury w/ functional mobility/gait  Treatment Day 1   Plan   Treatment/Interventions Functional transfer training;LE strengthening/ROM; Elevations; Therapeutic exercise; Endurance training;Patient/family training;Equipment eval/education; Bed mobility;Gait training; Compensatory technique education;Spoke to nursing   PT Frequency 7x/wk; Twice a day   Recommendation   Recommendation Short-term skilled PT; Home PT  (rehab vs home with PT services depending on progression)   Equipment Recommended Other (Comment)  (has RW at home  )   PT - OK to Discharge Yes   Additional Comments Yes to rehab when medically cleared, no to home until IPPT goals have been met    (Patient feels safest option for her is rehab at this time )   Modified Luis Scale   Modified Luis Scale 4   Barthel Index   Feeding 10   Bathing 0   Grooming Score 5   Dressing Score 5   Bladder Score 0   Bowels Score 10   Toilet Use Score 5   Transfers (Bed/Chair) Score 5   Mobility (Level Surface) Score 0   Stairs Score 0   Barthel Index Score 40     History: co - morbidities, fall risk, use of assistive device, assist for adl's, multiple lines, post op surgery, FÉLIX home, FOS to second level of home, lives alone  Exam: impairments in locomotion, musculoskeletal, balance,posture, joint integrity, skin integrity - surgical incision, decreased activity tolerance, decreased functional mobility, decreased strength, muscle activiation  Clinical: unstable/unpredictable (fall risk, pain, hip precautions, lives alone, Barthel 40, multiple lines)  Complexity:high      Time In: 1545  Time Out: 1610  Total Time: 25 minutes      S:  Patient agreeable to trial gait training today with RW   O:  Patient progressed to gait training ambulating 1 x 15ft with RW at min A x 1, verbal cueing for proper sequencing and turning technique along with noted gait deviations: decreased foot clearance, antalgic gait on R LE, step to pattern, poor heel push off, poor heel strike and excessively slow pace  Patient then performed stand to sit transfer with mod A x 1 with verbal cueing for hand and LE placement  Patient then performed sit to supine transfer from eob with mod A x 1 for LE management and verbal cueing for proper UE use  BP assessed at end of treatment: 126/70  A:  Patient declines sitting oob Patient tolerating gait training fairly well with mild complaints of fatigue and pain  Continue to anticipate patient will require rehab prior to d/c home but will continue to follow patient to progress towards IPPT goals and update recommendation as appropriate  Patient would prefer rehab at this time  P:  Continue with current POC       Brionna Joseph, SPT    Brionna Joseph            Patient Name: Altaf Johann Rubi  YGDFY'S Date: 7/10/2018

## 2018-07-10 NOTE — OP NOTE
OPERATIVE REPORT  PATIENT NAME: Emerita Rubi    :  1955  MRN: [id number]  Pt Location: [facility] OR ROOM 02    SURGERY DATE: 7/10/2018    Surgeon(s) and Role:     * May Lopez MD - Primary     * Freedom Ybarra PA-C - Assisting    Preop Diagnosis:  Primary osteoarthritis of right hip [M16 11]    Post-Op Diagnosis Codes:     * Primary osteoarthritis of right hip [M16 11]    Procedure(s) (LRB):  ARTHROPLASTY HIP TOTAL (Right)  Complications:   None    Procedure and Technique:       Emerita Rodriguez [facility] y o      cc: hip pain  DATE OF PROCEDURE: 07/10/18  PREOPERATIVE DIAGNOSIS: Severe degenerative joint disease, right hip  POSTOPERATIVE DIAGNOSES:  Same   PROCEDURE: right THR    IMPLANTS:  Vendor Depuy  Acetabulum 52 Eagle  Liner +4 N 36 ID  Femur 5 hi Actis  Head +5 36    ASSISTANT: Freedom Ybarra PA-C    COMPLICATIONS: None   DRAINS: None  ESTIMATED BLOOD LOSS: 100 ml  ANESTHESIA: spinal     DESCRIPTION OF PROCEDURE: The patient was taken to the Operating Room on  the day of surgery  The patient was identified and preoperative antibiotics  were administered  After successful anesthesia, the patient was placed in  the lateral decubitus position with the surgical hip up  The patient's pelvis was  held with pelvic positioning devices  The patient was then prepped and draped in the  usual sterile fashion  Ioban drape was placed over the proposed surgical  Site  Time out was performed and site marking identified  A longitudinal incision was made over the posterior aspect of the Trochanter  and carried down to the fascia  The fascia was split longitudinally  The  gluteus kristine musculature was split using Bovie electrocautery for  hemostasis  Charnley retractor was placed  The hip was brought into  extension, internal rotation, and the short external rotators were elevated  off the posterolateral aspect of the femur  Piriformis tendon was  identified and released  U-capsulotomy was performed   The hip was  dislocated  Femoral head/neck osteotomy was performed  Over-the-top  retractor was placed on the anterior wall of the acetabulum with the leg in  a flexed position  The acetabulum was identified and fully exposed  The  remnant of the labrum was resected and the cotyloid fossa was freed of soft  tissue  The posterior retractor was placed  This was free of the region of  the sciatic nerve  Sequential reaming of the acetabulum was performed  Medialization, followed by sequential reaming 1 mm below the implant size  The implant was impacted in place in a 20-degree anteverted position and  40-degree abduction angle  Liner was chosen and implanted with the locking mechanism engaged  A femoral neck retractor was placed  Any soft tissue was removed off the calcar  Lateralization was performed after use of a box osteotome  Sequential broaching of the canal was taken to fill the mediolateral plane with excellent fit against the calcar femorale  Calcar  planing was performed  This was in line with preoperative templating  Trial reduction was performed with proper neck segment and offset, and the  hip was reduced  The patient had excellent stability in all planes  including extension, external rotation  The patient was then tested in the  sleeping position, internal rotation to 60 degrees with no instability  Next, the patient was tested in neutral abduction, 90 degrees of flexion and  internal rotation to 60 degrees with no instability  The hip was  dislocated  Trials were removed  The femoral component was impacted  line-to-line with calcar planing  The appropriate ball segment was placed  on a clean, dry Emery taper and impacted  The hip was reduced and again  found to have excellent stability in all planes  Copious amounts of saline were used intermittently through the case in order to wash out the surgical site  Meticulous hemostasis was achieved with Bovie electrocautery   The tissues were bathed with a combination of saline and tranexamic acid for postoperative blood management  A layered closure was performed  #5 Fiberwire suture was used in order to close the U-capsulotomy and the piriformis tendon to the posterolateral aspect of the femur  Injection of the capsule and muscular structures were injected with Marcaine, Toradol and epinephrine for postoperative pain management  The fascia was  closed with #1 vicryls, followed by 2-0 Vicryl in subcutaneous fashion, followed by subcuticular Monocryl derma-bond Mastisol and Steri-Strips applied at the level of the skin  Clean, sterile, nonadherent dressings were applied  The patient was then taken back to the supine position  Limb lengths restored  The patient was awakened from anesthesia and taken to the Postanesthesia Care Unit in satisfactory condition  PA-C was present from the time the patient was taken to the Operating Room until the time the patient was taken to the Postanesthesia Care Unit  They were helpful with technical aspects of the case including retraction, leg holding, assembly of instruments and jigs, and eventual closure                     I was present for the entire procedure    Patient Disposition:  PACU     SIGNATURE: Jordan Adames MD  DATE: July 10, 2018  TIME: 10:04 AM

## 2018-07-10 NOTE — DISCHARGE INSTRUCTIONS
[address] [facility] Operative Joint Replacement Instructions    MARIANA Conde  Office Phone [phone]      MEDICATIONS  ¨no  Plavix: Patients on Plavix will resume their standard dose while in the hospital    ¨yes Enteric Coated Aspirin 81 mg: Take twice daily until your first post operative visit  ¨ Continue as you were in the hospital/rehab facility  ¨ no Coumadin: If we have started you on this for blood thinning, you will be tested on Mondays and Thursdays  The home health nurse will draw your blood  Our office will select the dosing instructions for you until your next blood draw  If you were on the medication before the surgery, the physician who manages your Coumadin will resume the care of that medication  On the day you have your blood test drawn, do not take your Coumadin dose until contacted by the office  If you have not heard by 4 P  M , please take the dose you took the day before and call the managing doctor's office for instructions in the morning  ¨ Senokot: This is our recommended stool softener  Please use this medication to help prevent constipation that can arise from iron and pain medication use  It is also advisable to increase your fluid intake and fiber in your diet during your ivone and pain medication therapy  ¨ Celebrex: Unless there is a contraindication, you were given a prescription for Celebrex prior to your surgery  Please continue to take daily until you finish the prescription  ¨ Pain Medications: Your prescriptions may run out before you return for your next visit  Please give us two regular office day's notice before you run out, to prevent any problems of not having pain medicine  Be advised that prescriptions for Oxycodone and Vicodin cannot be phoned to your pharmacy  They will need to be picked up at our office  Please refrain from using alcohol with your pain medicines  You may also include Tylenol for pain  You should not exceed 3000 mg of Tylenol in a day  Please be careful to not overdose the Tylenol  ¨ Arthritis/Anti-inflammatory: If you were taking an arthiritis medicine prior to your surgery, please wait until you have stopped the injection blood thinning medicine to resume taking it  ¨ Herbal Medicines: Please hold all these preparations until after your first post-operative visit  ACTIVITY  ¨ Your weight bearing status is: AS TOLERATED  ¨ If you have been furnished with an assistive device  Please use it until your therapist makes a change  ¨ You may participate in activity as tolerated  It is likely that you will tire more easily for a time after surgery  Please rest when you need it to help your healing process  ¨ Stairs are not restricted for you  Please climb stairs as instructed by your physical therapist   Vielka Esquivel For hip and knee replacement patients please elevate your leg or legs while resting  This is important to prevent lower leg swelling  ¨ When you are back at home you will likely have physical therapy three times a week  On the days you do not have formal therapy please perform the home exercises given to you  ¨ If you had a hip replacement, please follow the safety precautions given to you by the nurse or therapist taking care of you  ¨Immediately following the surgery you are not permitted to drive until cleared by your surgeon  This typically does not happen until your first visit after surgery or later  ¨Knee replacement patients may be passengers in a vehicle following their discharge from the hospital   ¨Hip replacement patients are not allowed in a vehicle, except for your trip home and your first follow up visist  Please follow these guidelines unless specifically instructed to start outpatient therapy at an earlier time  ¨ Please do not perform lifting tasks or strenuous domestic activities  ¨ If you currently are employed, you are off work until cleared by your surgeon   Everyone's ability to return to work is determined by his or her abilities  Your return to work may take a few weeks to a few months  ¨ Sexual activities are permitted as tolerated  ¨Hip replacement patients with standard protocol may ride home in a car from the hospital and first ride to office for post-op appointment  NORMAL FINDINGS  Numbness along the incision   Mechanical click of a knee replacement  Your incision area will feel warm  WOUND CARE  ¨ If you have a tegaderm dressing, you should maintain it for 5-7 days post operatively then remove it, or it will be removed by the home nurses  ¨ It is OK to shower with the tegaderm dressing on  Please do not submerge the incision into a pool, bath or hot tub until after your 1st post-operative visit  ¨ Please do not disturb your staples or the scabs  It promotes better healing and smaller scars  ¨ Cover the incision with gauze dressing until there is no further drainage  ¨ You may leave the incision open to the air after removing your dressing  ¨ Please be generous with the use of ice  It is a very good remedy  Apply ice for only twenty minutes at a time  You may use it every hour  It is recommended to ice before and after anticipated activities, which includes exercise and physical therapy  ¨ Wear your knee-high pressure stockings every day  They may be removed for sleep at night  Continue to wear them until you are seen for your first follow up  ¨ Your staples will be removed about two weeks after your surgery date  Home nurses and physical therapists typically remove them  If they are unable to, please call our office to have us do it for you  FOLLOW UP  ¨ You should have a scheduled visit with your surgeon scheduled for three to four weeks after surgery  If you do not remember your appointment date and time, or if you are sure you do not have one, please call to set one up    ¨ Please inform the office if you experience one of the following:  Fever that is not responding to Tylenol and is above 101 degrees   Redness of the skin that is increasing in area   Your incision is soaking the dressings with drainage or blood   You're unable to bear weight on your operative leg or legs

## 2018-07-11 LAB
ANION GAP SERPL CALCULATED.3IONS-SCNC: 8 MMOL/L (ref 4–13)
BUN SERPL-MCNC: 13 MG/DL (ref 5–25)
CALCIUM SERPL-MCNC: 8.4 MG/DL (ref 8.3–10.1)
CHLORIDE SERPL-SCNC: 102 MMOL/L (ref 100–108)
CO2 SERPL-SCNC: 26 MMOL/L (ref 21–32)
CREAT SERPL-MCNC: 0.62 MG/DL (ref 0.6–1.3)
ERYTHROCYTE [DISTWIDTH] IN BLOOD BY AUTOMATED COUNT: 14.7 % (ref 11.6–15.1)
GFR SERPL CREATININE-BSD FRML MDRD: 97 ML/MIN/1.73SQ M
GLUCOSE SERPL-MCNC: 125 MG/DL (ref 65–140)
HCT VFR BLD AUTO: 36 % (ref 34.8–46.1)
HGB BLD-MCNC: 11.6 G/DL (ref 11.5–15.4)
MCH RBC QN AUTO: 28.2 PG (ref 26.8–34.3)
MCHC RBC AUTO-ENTMCNC: 32.2 G/DL (ref 31.4–37.4)
MCV RBC AUTO: 87 FL (ref 82–98)
PLATELET # BLD AUTO: 126 THOUSANDS/UL (ref 149–390)
PMV BLD AUTO: 12.3 FL (ref 8.9–12.7)
POTASSIUM SERPL-SCNC: 3.5 MMOL/L (ref 3.5–5.3)
RBC # BLD AUTO: 4.12 MILLION/UL (ref 3.81–5.12)
SODIUM SERPL-SCNC: 136 MMOL/L (ref 136–145)
WBC # BLD AUTO: 13.18 THOUSAND/UL (ref 4.31–10.16)

## 2018-07-11 PROCEDURE — 97535 SELF CARE MNGMENT TRAINING: CPT

## 2018-07-11 PROCEDURE — 85027 COMPLETE CBC AUTOMATED: CPT | Performed by: PHYSICIAN ASSISTANT

## 2018-07-11 PROCEDURE — 97110 THERAPEUTIC EXERCISES: CPT

## 2018-07-11 PROCEDURE — 97530 THERAPEUTIC ACTIVITIES: CPT

## 2018-07-11 PROCEDURE — 97166 OT EVAL MOD COMPLEX 45 MIN: CPT

## 2018-07-11 PROCEDURE — G8987 SELF CARE CURRENT STATUS: HCPCS

## 2018-07-11 PROCEDURE — 97116 GAIT TRAINING THERAPY: CPT

## 2018-07-11 PROCEDURE — 80048 BASIC METABOLIC PNL TOTAL CA: CPT | Performed by: PHYSICIAN ASSISTANT

## 2018-07-11 PROCEDURE — G8988 SELF CARE GOAL STATUS: HCPCS

## 2018-07-11 RX ORDER — POTASSIUM CHLORIDE 20 MEQ/1
20 TABLET, EXTENDED RELEASE ORAL ONCE
Status: COMPLETED | OUTPATIENT
Start: 2018-07-11 | End: 2018-07-11

## 2018-07-11 RX ADMIN — SODIUM CHLORIDE, SODIUM LACTATE, POTASSIUM CHLORIDE, AND CALCIUM CHLORIDE 100 ML/HR: .6; .31; .03; .02 INJECTION, SOLUTION INTRAVENOUS at 01:18

## 2018-07-11 RX ADMIN — VENLAFAXINE HYDROCHLORIDE 150 MG: 150 CAPSULE, EXTENDED RELEASE ORAL at 08:04

## 2018-07-11 RX ADMIN — OXYCODONE HYDROCHLORIDE AND ACETAMINOPHEN 250 MG: 500 TABLET ORAL at 08:05

## 2018-07-11 RX ADMIN — ASPIRIN 81 MG 81 MG: 81 TABLET ORAL at 17:07

## 2018-07-11 RX ADMIN — DOCUSATE SODIUM 100 MG: 100 CAPSULE, LIQUID FILLED ORAL at 17:07

## 2018-07-11 RX ADMIN — Medication 1 TABLET: at 08:04

## 2018-07-11 RX ADMIN — ACETAMINOPHEN 650 MG: 325 TABLET, FILM COATED ORAL at 14:50

## 2018-07-11 RX ADMIN — ASPIRIN 81 MG 81 MG: 81 TABLET ORAL at 08:05

## 2018-07-11 RX ADMIN — CELECOXIB 200 MG: 200 CAPSULE ORAL at 08:04

## 2018-07-11 RX ADMIN — Medication 200 MG: at 08:05

## 2018-07-11 RX ADMIN — POTASSIUM CHLORIDE 20 MEQ: 1500 TABLET, EXTENDED RELEASE ORAL at 20:44

## 2018-07-11 RX ADMIN — METHOCARBAMOL 500 MG: 500 TABLET ORAL at 20:44

## 2018-07-11 RX ADMIN — OXYCODONE HYDROCHLORIDE 10 MG: 10 TABLET ORAL at 08:05

## 2018-07-11 RX ADMIN — OXYCODONE HYDROCHLORIDE 5 MG: 5 TABLET ORAL at 14:50

## 2018-07-11 RX ADMIN — OXYCODONE HYDROCHLORIDE 10 MG: 10 TABLET ORAL at 20:44

## 2018-07-11 RX ADMIN — DOCUSATE SODIUM 100 MG: 100 CAPSULE, LIQUID FILLED ORAL at 08:05

## 2018-07-11 NOTE — PLAN OF CARE
Problem: PHYSICAL THERAPY ADULT  Goal: Performs mobility at highest level of function for planned discharge setting  See evaluation for individualized goals  Treatment/Interventions: Functional transfer training, LE strengthening/ROM, Elevations, Therapeutic exercise, Endurance training, Patient/family training, Equipment eval/education, Bed mobility, Gait training, Compensatory technique education, Spoke to nursing  Equipment Recommended: Other (Comment) (has RW at home )       See flowsheet documentation for full assessment, interventions and recommendations  Outcome: Progressing  Prognosis: Good  Problem List: Decreased strength, Decreased range of motion, Decreased endurance, Impaired balance, Decreased mobility, Orthopedic restrictions, Pain  Assessment: Pt was found supine in bed upon arrival this PM and was willing to participate in PT  Pt demonstrated improvement this session with mobility and was able to progress ambulation distances to 68' x1 and 15' x1 RW and Min A x1 with one standing rest break and one seated rest break for toileting between gait trials  Pt requires verbal cueing during ambulation for proper pace and adhering to THP's when turning  Pt was able to progress to step-through gait pattern without verbal cues  Pt was also able to progress with supine THR exercises to 12 reps and required less assistance for exercises compared to previous session  Pt demonstrates compliance with THR HEP, as patient stated she was able to complete exercises on her own prior to this afternoon's session  Pt continues to require Min A x1 for bed mobility and transfers, however less verbal cueing needed for mobility techniques compared to previous session  Pt was also able to perform sit>stand toilet transfer with Mod I and demonstrated fair standing static and dynamic balance when washing hands with decreased assistance needed  Pt was repositioned in bedside chair at end of session and denied use of ice  Continue to recommend rehab at this time inorder to maximize functional mobility and Idependence for eventual d/c to home alone  Barriers to Discharge: Inaccessible home environment, Decreased caregiver support  Barriers to Discharge Comments: lives alone, 1 FÉLIX and FOS to 2nd level  Recommendation: Short-term skilled PT     PT - OK to Discharge: Yes (to rehab)    See flowsheet documentation for full assessment

## 2018-07-11 NOTE — PROGRESS NOTES
Orthopedic Total Joint Progress Note    Assessment/Plan     Marlana Kussmaul Da Re seen post op day 1  Status post-right Hip total joint arthroplasty: Doing well postoperatively  Marlana Kussmaul Da Re seen in bed  No apparent distress  Vital signs in last 24 hours:  Temp:  [97 4 °F (36 3 °C)-100 5 °F (38 1 °C)] 98 6 °F (37 °C)  HR:  [] 74  Resp:  [13-20] 16  BP: ()/(62-91) 105/72      Physical Exam  Dressing C/D/I   + ADF/PF  Pulse intact  Calf soft/non-tender  Sensation intact          Data Review:  CBC:   Lab Results   Component Value Date    WBC 13 18 (H) 07/11/2018    WBC 5 8 07/09/2016    RBC 4 12 07/11/2018    RBC 4 78 07/09/2016    HGB 11 6 07/11/2018    HGB 13 2 07/09/2016    HCT 36 0 07/11/2018    HCT 41 0 07/09/2016     (L) 07/11/2018     07/09/2016     No results found for: INR    Plan:  Discharge Planning  Anticoagulation as ordered  Physical Therapy- WBAT  Continue Medical Management          Monet Wei MD    Date: 7/67/1005  Time: 8:21 AM

## 2018-07-11 NOTE — CASE MANAGEMENT
Thank you,  Caroline Aqq  [facility] Utilization Review Department  Phone: [phone]; Fax [phone]  ATTENTION: The Network Utilization Review Department is now centralized for our 9 Facilities  Make a note that we have a new phone and fax numbers for our Department  Please call with any questions or concerns to [phone] and carefully follow the prompts so that you are directed to the right person  All voicemails are confidential  Fax any determinations, approvals, denials, and requests for initial or continue stay review clinical to [phone]  Due to HIGH CALL volume, it would be easier if you could please send faxed requests to expedite your requests and in part, help us provide discharge notifications faster  Initial Clinical Review    ELECTIVE INPATIENT SURGERY  IP Terry Flores [id number] FOR [id number] APPROVED FOR ONE DAY AND REVIEW PER CHELSEA JAMES      Age/Sex: 59 y o  female    Surgery Date: 7/10/18    Procedure: ARTHROPLASTY HIP TOTAL (Right)    Anesthesia: spinal    Admission Orders: Date/Time/Statement: inpatient 7/10/18 @ 1026     Orders Placed This Encounter   Procedures    Inpatient Admission     Standing Status:   Standing     Number of Occurrences:   1     Order Specific Question:   Admitting Physician     Answer:   Vivek Roque     Order Specific Question:   Level of Care     Answer:   Med Surg [16]     Order Specific Question:   Estimated length of stay     Answer:   More than 2 Midnights     Order Specific Question:   Certification     Answer:   I certify that inpatient services are medically necessary for this patient for a duration of greater than two midnights  See H&P and MD Progress Notes for additional information about the patient's course of treatment         Vital Signs: /72 (BP Location: Right arm)   Pulse 74   Temp 98 6 °F (37 °C) (Tympanic)   Resp 16   Ht 5' 2" (1 575 m)   Wt 83 9 kg (185 lb)   SpO2 100%   BMI 33 84 kg/m²     Diet:        Diet Orders            Start     Ordered    07/10/18 1425  Diet Regular; Regular House  Diet effective now     Question Answer Comment   Diet Type Regular    Regular Regular House    RD to adjust diet per protocol?  Yes        07/10/18 1422          Mobility: activity as misa, shower, wbat, turning @frequent intervals, cons PT    DVT Prophylaxis: ambulatory, knee high TEDS, SCD's    Pain Control:   Pain Medications             ibuprofen (ADVIL) 200 mg tablet Take 200 mg by mouth every 6 (six) hours as needed for mild pain    oxyCODONE (ROXICODONE) 5 mg immediate release tablet  GIVEN X 4 Take 1-2 tablets (5-10 mg total) by mouth every 4 (four) hours as needed for severe pain Max Daily Amount: 60 mg    traMADol (ULTRAM) 50 mg tablet Take 1 tablet (50 mg total) by mouth every 6 (six) hours as needed for moderate pain      IV toradol x 1

## 2018-07-11 NOTE — PHYSICAL THERAPY NOTE
Physical Therapy Progress Note     07/11/18 1556   Pain Assessment   Pain Assessment 0-10   Pain Score 4   Pain Type Surgical pain   Pain Location Hip   Pain Orientation Right   Pain Frequency Constant/continuous   Pain Onset Ongoing   Clinical Progression Not changed   Effect of Pain on Daily Activities Did not increase with activity   Hospital Pain Intervention(s) Ambulation/increased activity; Emotional support;Rest;Repositioned   Response to Interventions Tolerated   Precautions   Total Hip Replacement ADduction; Internal rotation; Flexion   Restrictions/Precautions   Weight Bearing Precautions Per Order Yes   RLE Weight Bearing Per Order WBAT   Other Precautions WBS;THR;Fall Risk;Pain   General   Chart Reviewed Yes   Response to Previous Treatment Patient with no complaints from previous session  Family/Caregiver Present No   Cognition   Overall Cognitive Status WFL   Arousal/Participation Alert; Responsive; Cooperative   Attention Within functional limits   Orientation Level Oriented X4   Memory Within functional limits   Following Commands Follows all commands and directions without difficulty   Comments Maintains THP's with all aspects of mobility   Subjective   Subjective "I think the pain is mostly around my incision "   Bed Mobility   Supine to Sit 4  Minimal assistance   Additional items Assist x 1;HOB elevated; Increased time required;Verbal cues;LE management   Additional Comments Less assistance needed with LE    Transfers   Sit to Stand 4  Minimal assistance   Additional items Assist x 1; Armrests; Increased time required;Verbal cues   Stand to Sit 4  Minimal assistance   Additional items Assist x 1; Increased time required;Verbal cues;Armrests   Toilet transfer 6  Modified independent   Additional items Assist x 1; Armrests; Increased time required;Verbal cues; Commode   Ambulation/Elevation   Gait pattern Improper Weight shift;Decreased R stance; Short stride; Step to;Excessively slow  (Able to progress to step-through gait pattern without cueing)   Gait Assistance 4  Minimal assist   Additional items Assist x 1;Verbal cues   Assistive Device Bariatric Rolling walker   Distance 73' x1, 15' x1   Balance   Static Sitting Good   Dynamic Sitting Fair +   Static Standing Fair   Dynamic Standing Fair -   Ambulatory Poor +   Endurance Deficit   Endurance Deficit Yes   Endurance Deficit Description Pain and fatigue   Activity Tolerance   Activity Tolerance Patient limited by fatigue;Patient limited by pain   Exercises   THR Supine;AAROM;AROM; Right  (x 12 reps Required AAROM for SLR and hip abduction)   Assessment   Prognosis Good   Problem List Decreased strength;Decreased range of motion;Decreased endurance; Impaired balance;Decreased mobility;Orthopedic restrictions;Pain   Assessment Pt was found supine in bed upon arrival this PM and was willing to participate in PT  Pt demonstrated improvement this session with mobility and was able to progress ambulation distances to 68' x1 and 15' x1 RW and Min A x1 with one standing rest break and one seated rest break for toileting between gait trials  Pt requires verbal cueing during ambulation for proper pace and adhering to THP's when turning  Pt was able to progress to step-through gait pattern without verbal cues  Pt was also able to progress with supine THR exercises to 12 reps and required less assistance for exercises compared to previous session  Pt demonstrates compliance with THR HEP, as patient stated she was able to complete exercises on her own prior to this afternoon's session  Pt continues to require Min A x1 for bed mobility and transfers, however less verbal cueing needed for mobility techniques compared to previous session  Pt was also able to perform sit>stand toilet transfer with Mod I and demonstrated fair standing static and dynamic balance when washing hands with decreased assistance needed   Pt was repositioned in bedside chair at end of session and denied use of ice  Continue to recommend rehab at this time in order to maximize functional mobility and independence for eventual d/c to home alone  Barriers to Discharge Inaccessible home environment;Decreased caregiver support   Barriers to Discharge Comments lives alone, 1 FÉLIX and FOS to 2nd level   Goals   Patient Goals to go to rehab   STG Expiration Date 07/20/18   Treatment Day 3   Plan   Treatment/Interventions Functional transfer training;LE strengthening/ROM; Elevations; Therapeutic exercise; Endurance training;Patient/family training;Equipment eval/education; Bed mobility;Gait training   Progress Progressing toward goals   PT Frequency 7x/wk; Twice a day   Recommendation   Recommendation Short-term skilled PT   PT - OK to Discharge Yes  (to rehab)     Loan Ogden, PTA Student

## 2018-07-11 NOTE — PLAN OF CARE
Problem: OCCUPATIONAL THERAPY ADULT  Goal: Performs self-care activities at highest level of function for planned discharge setting  See evaluation for individualized goals  Treatment Interventions: ADL retraining, Functional transfer training, Endurance training, Patient/family training, Equipment evaluation/education, Compensatory technique education, Continued evaluation, Energy conservation, Activityengagement          See flowsheet documentation for full assessment, interventions and recommendations  Limitation: Decreased ADL status, Decreased endurance, Decreased self-care trans, Decreased high-level ADLs  Prognosis: Fair  Assessment: Pt is a 59 y o  female seen for OT evaluation s/p adm to [address] on 7/10/2018 s/p R THR Posterolateral approach on 07/10/2018  Pt w/ orders for WBAT R LE  Pt (+) Posterolateral hip precautions  Comorbidities affecting pts functional performance include a significant PMH of anxiety, arthritis, chronic pain disorder, depression, HTN, and hx of transfusion  Pt with active OT orders and activity orders for Activity as tolerated  Pt lives alone in a two level home with 1 FÉLIX and FOS to bed/bath located on 2nd floor  Pt reports local family/friends that are able to assist as needed  At baseline, pt was I w/ ADLs, IADLs, and functional mobility/transfers w/o use of DME/AD, (+) , FT employed, and reports 0 falls  Upon evaluation, pt currently requires Mod A for LB ADLs, Min A for Toileting, Mod I for UB ADLs, Min A for bed mobility, and Min A for functional mobility/transfers 2* the following deficits impacting occupational performance: weakness, decreased strength, decreased balance, decreased tolerance and increased pain   These impairments, as well at pts steps to enter environment, limited home support, difficulty performing ADLS, difficulty performing IADLS  and (+) Posterolateral hip precautions limit pts ability to safely engage in all baseline areas of occupation, including grooming, bathing, dressing, toileting, functional mobility/transfers, community mobility, laundry , house maintenance, meal prep, cleaning, social participation  and leisure activities   Pt scored overall 55/100 on the Barthel Index  Based on the aforementioned OT evaluation, functional performance deficits, and assessments, pt has been identified as a moderate complexity evaluation  Pt to continue to benefit from continued acute OT services during hospital stay to address defined deficits and to maximize level of functional independence in the following Occupational Performance areas: grooming, bathing/shower, toilet hygiene, dressing, socialization, health maintenance, functional mobility, community mobility, clothing management, cleaning, meal prep, household maintenance, social participation and transfers to common household surfaces  From OT standpoint, recommend STR vs Home OT pending progress upon D/C (pt prefers STR at this time)  OT will continue to follow pt 3-5x/wk to address the following goals to  w/in 7-10 days:     OT Discharge Recommendation: Short Term Rehab (vs Home OT pending progress;  Pt prefers STR)  OT - OK to Discharge: Yes (when medically cleared to STR)

## 2018-07-11 NOTE — PLAN OF CARE

## 2018-07-11 NOTE — PHYSICAL THERAPY NOTE
Physical Therapy Progress Note   07/11/18 1017   Pain Assessment   Pain Assessment 0-10   Pain Score 4   Pain Type Surgical pain   Pain Location Hip   Pain Orientation Right   Pain Frequency Constant/continuous   Pain Onset Ongoing   Clinical Progression Gradually improving   Effect of Pain on Daily Activities No increase in pain with activity   Hospital Pain Intervention(s) Cold applied;Repositioned; Ambulation/increased activity; Emotional support; Rest   Response to Interventions Tolerated   Precautions   Total Hip Replacement ADduction; Internal rotation; Flexion   Restrictions/Precautions   Weight Bearing Precautions Per Order Yes   RLE Weight Bearing Per Order WBAT   Other Precautions WBS;THR;Fall Risk;Pain;Cognitive  (posterolateral hip precautions)   General   Chart Reviewed Yes   Response to Previous Treatment Patient with no complaints from previous session  Family/Caregiver Present No   Cognition   Overall Cognitive Status WFL   Arousal/Participation Alert; Responsive; Cooperative   Attention Within functional limits   Orientation Level Oriented X4   Memory Within functional limits   Following Commands Follows all commands and directions without difficulty   Comments Able to recall 3/3 THP's   Subjective   Subjective "I would like to go to rehab "     Bed Mobility   Sit to Supine 3  Moderate assistance   Additional items Assist x 1;Leg ; Increased time required;Verbal cues;LE management   Additional Comments Verbal cueing for proper technique with leg  and proper technique to get into bed   Transfers   Sit to Stand 4  Minimal assistance   Additional items Assist x 1; Armrests; Increased time required;Verbal cues   Stand to Sit 4  Minimal assistance   Additional items Assist x 1; Increased time required;Verbal cues   Ambulation/Elevation   Gait pattern Improper Weight shift; Antalgic; Short stride; Step to;Excessively slow;Decreased R stance   Gait Assistance 4  Minimal assist   Additional items Assist x 1;Verbal cues; Tactile cues   Assistive Device Bariatric Rolling walker   Distance 52' x1   Balance   Static Sitting Good   Dynamic Sitting Fair +   Static Standing Fair -   Dynamic Standing Poor +   Ambulatory Poor +   Higher level balance Side stepping  (3 side steps to [facility])   Endurance Deficit   Endurance Deficit Yes   Endurance Deficit Description Pain and fatigue   Activity Tolerance   Activity Tolerance Patient limited by fatigue;Patient limited by pain   Nurse Angelica Banegas, RN   Exercises   Quad Sets Sitting;10 reps;AROM; Right   Heelslides Supine;10 reps;AAROM; Right   Glute Sets Sitting;10 reps;AROM; Bilateral  (increased verbal cues)   Hip Flexion Supine;10 reps;AAROM; Right   Hip Abduction Supine;10 reps;AAROM; Right   Hip Adduction Supine;10 reps;AAROM; Right  (to midline)   Knee AROM Short Arc Quad Supine;10 reps;AAROM; Right   Knee AROM Long Arc Quad Sitting;10 reps;AROM; Bilateral   Ankle Pumps Sitting;15 reps;AROM; Bilateral   Assessment   Prognosis Good   Problem List Decreased strength;Decreased range of motion;Decreased endurance; Impaired balance;Decreased mobility;Orthopedic restrictions;Pain   Assessment Pt found seated OOB this AM and was willing to participate in PT  Pt able to recall 3/3 THP's with minimal cueing needed  Overall pt was able to progress this session to seated and supine THR HEP and denies increase in pain during activity  Pt requires verbal and tactile cueing for all exercises for proper exercise technique and completion and also required AAROM for heel slides, hip abduction/adduction, straight leg raises, and SAQ's  Pt was able to progress with ambulation distances to 52' x1 RW and Min A x1 gait deviations as noted no gross lob noted  Pt required verbal cueing during ambulation for proper LE sequencing and cues for proper pace and maintaining THP's when turning with RW  Pt continues to require Min A x1 for all transfers and ambulation and  Mod A x1 for bed mobility   Pt  required assistance for  R LE management with use of leg  and proper technique while performing sit>supine  Pt was encouraged to perform THR HEP throughout the day to increase strength and ROM to LE's  Pt was repositioned in supine with cold pack to R hip prior to ending session  Pt is agreeable to rehab at this time and reports that she prefers rehab vs HHPT  Barriers to Discharge Inaccessible home environment;Decreased caregiver support   Barriers to Discharge Comments lives alone, 1 FÉLIX, and 1 FOS to second level    Goals   Patient Goals To go to rehab   STG Expiration Date 07/20/18   Treatment Day 2   Plan   Treatment/Interventions Functional transfer training;LE strengthening/ROM; Elevations; Therapeutic exercise; Endurance training;Patient/family training;Equipment eval/education; Bed mobility;Gait training;Spoke to nursing;OT   Progress Progressing toward goals   PT Frequency 7x/wk; Twice a day   Recommendation   Recommendation Short-term skilled PT; Home PT  (Depending on progress toward goals)   PT - OK to Discharge Yes  (to rehab, no to home)     Lizzie Sales, PTA Student

## 2018-07-11 NOTE — SOCIAL WORK
CM met with patient at bedside to address discharge needs  Patient lives in a 2 story house alone; bedroom/bathroom is located on second floor  Paitent denies difficulty with steps PTA  Patient is independent with ADLs and functional mobility  Patient denies use of DME PTA; patient reports having raised toilet seats, she will need RW for discharge  Patient is recommended for STR; CM discussed with patient, she reports a hx of being at [facility], however, she is not interested in returning to [facility]  Patient is requesting a referral to [facility], CM discussed [facility] with patient, patient is refusing a referral to [facility] at this time as she wishes to stay in [facility]; patient informed CM she will reconsider after we get a determination from [facility], referral submitted  Patient drives; patient will need [facility] transport to STR; CM discussed cost of [facility] transport, patient is agreeable to paying for transport  PCP identified as Dr Willean Duverney POA identified as her brother Yesenia Field  Patient made aware of [address] to use at discharge if needed  Help/support reported  Patient made aware of CM's name, number and role  No other needs at present  CM to follow as needed

## 2018-07-11 NOTE — OCCUPATIONAL THERAPY NOTE
[address]  Evaluation     Patient Name: Jesse Rodriguez Re  LNNVD'T Date: 2018  Problem List  Patient Active Problem List   Diagnosis    Benign essential hypertension    Cervical radiculopathy    Irritable bowel syndrome    Nontoxic single thyroid nodule    Vitamin D deficiency    Vitamin B12 deficiency    Menopausal symptoms    Anxiety    Primary localized osteoarthritis of pelvic region and thigh     Past Medical History  Past Medical History:   Diagnosis Date    ACE-inhibitor cough     Last assessed 12/04/15    Anxiety     Arthritis     Chronic pain disorder     Depression     History of transfusion     no adverse reaction    Hypertension     Wears glasses      Past Surgical History  Past Surgical History:   Procedure Laterality Date     SECTION      CHOLECYSTECTOMY      COLONOSCOPY      JOINT REPLACEMENT Left     hip    VA TOTAL HIP ARTHROPLASTY Right 7/10/2018    Procedure: ARTHROPLASTY HIP TOTAL;  Surgeon: Didier Guevara MD;  Location: [facility];  Service: Orthopedics    TONSILLECTOMY AND ADENOIDECTOMY      TUBAL LIGATION      VAGINAL HYSTERECTOMY Bilateral 2007    nd ooperectomy         18 0841   Note Type   Note type Eval/Treat   Restrictions/Precautions   Weight Bearing Precautions Per Order Yes   RLE Weight Bearing Per Order WBAT   Other Precautions WBS;THR;Fall Risk;Pain  (+ Posterolateral hip precautions)   Pain Assessment   Pain Assessment 0-10   Pain Score 6   Pain Type Acute pain;Surgical pain   Pain Location Hip   Pain Orientation Right   Pain Frequency Constant/continuous   Pain Onset Ongoing   Clinical Progression Gradually improving   Effect of Pain on Daily Activities Increased pain with activity   Patient's Stated Pain Goal No pain   Hospital Pain Intervention(s) Cold applied;Repositioned; Ambulation/increased activity; Emotional support   Response to Interventions Tolerated   Home Living   Type of [facility] Two level;Bed/bath upstairs;Stairs to enter without rails  (1 FÉLIX; FOS to bed/bath on 2nd floor)   Bathroom Shower/Tub Tub/shower unit   [employer] Raised   Bathroom Equipment Grab bars in shower; Shower chair;Commode;Grab bars around toilet   [address]  (reports she does not own RW)   Additional Comments Pt lives alone in a two level home with 1 FÉLIX and FOS to bed/bath located on 2nd floor  Pt reports local family/friends that are able to assist as needed  Prior Function   Level of [place] Independent with ADLs and functional mobility   Lives With Alone   Receives Help From Family   ADL Assistance Independent   IADLs Independent   Falls in the last 6 months 0   Vocational Full time employment   Comments At baseline, pt was I w/ ADLs, IADLs, and functional mobility/transfers w/o use of DME/AD, (+) , FT employed, and reports 0 falls  Lifestyle   Autonomy At baseline, pt was I w/ ADLs, IADLs, and functional mobility/transfers w/o use of DME/AD, (+) , FT employed, and reports 0 falls  Reciprocal Relationships Lives alone; Local family/friends   Service to Others FT employed as    Psychosocial   Psychosocial (WDL) WDL   ADL   Eating Assistance 7  [facility] 5  Supervision/Setup   UB [address] 6  Modified Independent   LB [address] 3  Moderate Assistance   [address] 6  Modified independent   [address] 3  Moderate [address]  4  Minimal Assistance   Bed Mobility   Supine to Sit 4  Minimal assistance   Additional items Assist x 1;HOB elevated; Bedrails; Increased time required;Verbal cues;LE management   Sit to Supine Unable to assess  (Pt seated OOB in chair at end of session)   Additional Comments Pt seated OOB in chair at end of session with call bell and phone within reach  All needs met and pt reports no further questions for OT at this time  Transfers   Sit to Stand 4  Minimal assistance   Additional items Assist x 1;Bedrails; Increased time required;Verbal cues   Stand to Sit 4  Minimal assistance   Additional items Assist x 1; Armrests; Increased time required;Verbal cues   Additional Comments Cues for safe technique and placement of hands and operative leg   Functional Mobility   Functional Mobility 4  Minimal assistance   Additional Comments Assist x1   Additional items Rolling walker   Balance   Static Sitting Fair +   Dynamic Sitting Fair   Static Standing Fair -   Dynamic Standing Fair -   Ambulatory Poor +   Activity Tolerance   Activity Tolerance Patient limited by fatigue;Patient limited by pain   Nurse Made Aware Pt appropriate to be seen per RN Nupur   RUE Assessment   RUE Assessment WFL   RUE Strength   RUE Overall Strength Within Functional Limits - able to perform ADL tasks with strength  (4/5)   LUE Assessment   LUE Assessment WFL   LUE Strength   LUE Overall Strength Within Functional Limits - able to perform ADL tasks with strength  (4/5)   Hand Function   Gross Motor Coordination Functional   Fine Motor Coordination Functional   Sensation   Light Touch No apparent deficits   Sharp/Dull No apparent deficits   Vision-Basic Assessment   Current Vision Wears glasses all the time   Vision - Complex Assessment   Ocular Range of Motion [facility]   Acuity Able to read clock/calendar on wall without difficulty   Perception   Inattention/Neglect Appears intact   Cognition   Overall Cognitive Status [facility]   Arousal/Participation Alert; Cooperative   Attention Within functional limits   Orientation Level Oriented X4   Memory Within functional limits   Following Commands Follows all commands and directions without difficulty   Assessment   Limitation Decreased ADL status; Decreased endurance;Decreased self-care trans;Decreased high-level ADLs   Prognosis Fair   Assessment Pt is a 59 y o  female seen for OT evaluation s/p adm to [facility] on 7/10/2018 s/p R THR Posterolateral approach on 07/10/2018  Pt w/ orders for WBAT R LE  Pt (+) Posterolateral hip precautions  Comorbidities affecting pts functional performance include a significant PMH of anxiety, arthritis, chronic pain disorder, depression, HTN, and hx of transfusion  Pt with active OT orders and activity orders for Activity as tolerated  Pt lives alone in a two level home with 1 FÉLIX and FOS to bed/bath located on 2nd floor  Pt reports local family/friends that are able to assist as needed  At baseline, pt was I w/ ADLs, IADLs, and functional mobility/transfers w/o use of DME/AD, (+) , FT employed, and reports 0 falls  Upon evaluation, pt currently requires Mod A for LB ADLs, Min A for Toileting, Mod I for UB ADLs, Min A for bed mobility, and Min A for functional mobility/transfers 2* the following deficits impacting occupational performance: weakness, decreased strength, decreased balance, decreased tolerance and increased pain  These impairments, as well at pts steps to enter environment, limited home support, difficulty performing ADLS, difficulty performing IADLS  and (+) Posterolateral hip precautions limit pts ability to safely engage in all baseline areas of occupation, including grooming, bathing, dressing, toileting, functional mobility/transfers, community mobility, laundry , house maintenance, meal prep, cleaning, social participation  and leisure activities   Pt scored overall 55/100 on the Barthel Index  Based on the aforementioned OT evaluation, functional performance deficits, and assessments, pt has been identified as a moderate complexity evaluation   Pt to continue to benefit from continued acute OT services during hospital stay to address defined deficits and to maximize level of functional independence in the following Occupational Performance areas: grooming, bathing/shower, toilet hygiene, dressing, socialization, health maintenance, functional mobility, community mobility, clothing management, cleaning, meal prep, household maintenance, social participation and transfers to common household surfaces  From OT standpoint, recommend STR vs Home OT pending progress upon D/C (pt prefers STR at this time)  OT will continue to follow pt 3-5x/wk to address the following goals to  w/in 7-10 days:   Goals   Patient Goals to go to rehab   LTG Time Frame 7-10   Long Term Goal Please refer to LTGs listed below   Plan   Treatment Interventions ADL retraining;Functional transfer training; Endurance training;Patient/family training;Equipment evaluation/education; Compensatory technique education;Continued evaluation; Energy conservation; Activityengagement   Goal Expiration Date 18   Treatment Day 1   OT Frequency 3-5x/wk   Additional Treatment Session   Start Time 830   End Time    Treatment Assessment Pt seen for additional OT treatment session this AM focusing on education of Posterolateral THPs and education on [facility] AE for LB ADLs  Pt alert and cooperative throughout session  Pt seated OOB in chair at start of session  Pt able to state 2/3 THPs at start of session, requiring additional education on remaining 1/3 THPs  Distributed THR Packet which educates pt on Posterolateral precautions during ADLs and IADLs with pt reported no questions regarding education  Additionally, educated pt on available [facility] AE (ie: sock aid, reacher, dressing stick, LH Shoehorn, LH sponge) to increase independence in ADLs and ensure adherence to THPs  Pt reported owning reacher at home and experience with [facility] AE from previous L THR approx  7 years ago  Pt requested to practice with LH AE in later session 2* breakfast tray arriving in room  Pt able to state 3/3 THPs at end of session w/o cues from therapist  Pt seated OOB in chair at end of session with call bell and phone within reach  All needs met and pt reports no further questions for OT at this time   Continue to recommend STR vs Home OT pending progress (pt prefers STR at this time)  OT to continue to follow pt on caseload  Additional Treatment Day 1   Recommendation   OT Discharge Recommendation Short Term Rehab  (vs Home OT pending progress;  Pt prefers STR)   OT - OK to Discharge Yes  (when medically cleared to STR)   Barthel Index   Feeding 10   Bathing 0   Grooming Score 5   Dressing Score 5   Bladder Score 10   Bowels Score 10   Toilet Use Score 5   Transfers (Bed/Chair) Score 10   Mobility (Level Surface) Score 0   Stairs Score 0   Barthel Index Score 55   Modified Luis Scale   Modified Henderson Scale 4        GOALS    1) Pt will improve activity tolerance to G for min 30 min txment sessions    2) Pt will complete bed mobility at a Mod I level w/ G balance/safety demonstrated     3) Pt will be able to state 3/3 Posterolateral Hip precautions w/o cues from therapist     4) Pt will demonstrate 100% adherence to  3/3 Posterolateral Hip precautions during functional activities w/o cues from therapist     5) Pt will complete UB/LB dressing/self care w/ mod I using adaptive device and DME as needed    6) Pt will complete bathing w/ Mod I w/ use of AE and DME as needed    7) Pt will complete toileting w/ mod I w/ G hygiene/thoroughness using DME as needed    8) Pt will improve functional transfers to Mod I on/off all surfaces using DME as needed w/ G balance/safety     9) Pt will improve functional mobility during ADL/IADL/leisure tasks to Mod I using DME as needed w/ G balance/safety     10) Pt will participate in simulated IADL management task to increase independence to Mod I w/ G safety and endurance    11) Pt will engage in ongoing cognitive assessment w/ G participation w/ mod I to assist w/ safe d/c planning/recommendations    12) Pt will demonstrate G carryover of pt/caregiver education and training as appropriate w/ mod I w/o cues w/ good tolerance    13) Pt will demonstrate 100% carryover of energy conservation techniques w/ mod I t/o functional I/ADL/leisure tasks w/o cues s/p skilled education      Muna Miranda, OTR/L       Muna Miranda, OTR/L

## 2018-07-11 NOTE — PLAN OF CARE
Problem: PHYSICAL THERAPY ADULT  Goal: Performs mobility at highest level of function for planned discharge setting  See evaluation for individualized goals  Treatment/Interventions: Functional transfer training, LE strengthening/ROM, Elevations, Therapeutic exercise, Endurance training, Patient/family training, Equipment eval/education, Bed mobility, Gait training, Compensatory technique education, Spoke to nursing  Equipment Recommended: Other (Comment) (has RW at home )       See flowsheet documentation for full assessment, interventions and recommendations  Outcome: Progressing  Prognosis: Good  Problem List: Decreased strength, Decreased range of motion, Decreased endurance, Impaired balance, Decreased mobility, Orthopedic restrictions, Pain  Assessment: Pt was found supine in bed upon arrival this PM and was willing to participate in PT  Pt demonstrated improvement this session and was able to progress ambulation distances to 68' x1 and 15' x1 RW and Min A x1 with one standing rest break and toilet transfer in between trials  Pt requires verbal cueing during ambulation for proper pace and adhering to THP's when turning  Pt was able to progress to step-through gait pattern without verbal cues  Pt was also able to progress with supine THR exercises to 12 reps and required less assistance for exercises compared to this morning  Pt demonstrates compliance with THR HEP, as patient stated she was able to complete exercises on her own prior to this afternoon's session  Continues to require Min A x1 for bed mobility and transfers, however less assistance and verbal cues were needed compared to this AM's session  Pt was also able to perform sit>stand toilet transfer with Mod I and demonstrated fair standing static and dynamic balance when washing hands with decreased assistance needed  Pt was repositioned in bedside chair at end of session and denied use of ice   Continue to recommend rehab at this time to assist with transition to home  Barriers to Discharge: Inaccessible home environment, Decreased caregiver support  Barriers to Discharge Comments: lives alone, 1 FÉLIX and FOS to 2nd level  Recommendation: Short-term skilled PT     PT - OK to Discharge: Yes (to rehab)    See flowsheet documentation for full assessment

## 2018-07-11 NOTE — PROGRESS NOTES
Progress Note - Internal Medicine   Krystyna Alba Da Re 59 y o  female MRN: [id number]  Unit/Bed#: E2 -01 Encounter: [id number]      Impression :    POD #1, elective right total hip replacement by Dr Sara Morales  Advanced end-stage osteoarthritis right hip, failed conservative treatments  Obesity with BMI of 33 84  Benign essential hypertension- well controlled without meds ! Preoperative mild thrombocytopenia, platelet count 131 on 06/19/2018 and 126 today  Ambulatory dysfunction  Anxiety disorder  History of irritable bowel syndrome  Chronic lower back pain due to scoliosis and DJD  History of  B12 deficiency/vitamin-D deficiency  Severe multilevel degenerative changes complicated by left convex thoracolumbar rotoscoliosis and kyphosis     Severe left L4-5 foraminal stenosis  Mild hypokalemia, potassium 3 5  Mild leukocytosis at 13,000    Recommendation:    Patient is resting comfortably not in any major distress  Continue postoperative care as recommended by primary service  Patient has not restarted taking antihypertensive medication and his blood pressure is very well controlled without it  She has slight hypokalemia and would recommend 20 mEq of K-Dur 1 time now  Continue increasing oral intake of fluids, recheck her hemoglobin and white count tomorrow  Continue DVT prophylaxis as per primary service  Full fall precautions orthostatic precautions continue incentive spirometry  All question answered  Subjective:     Patient seen and examined today  EMR and overnight events reviewed  Mild pain rated 3/10 on numeric pain scale on her right hip  Patient lives alone and states that she would prefer going to short-term rehabilitation  She denies any fever chills or rigors denies any nausea or vomiting  Pain is mild and has been taking medications which help her  Denies any bleeding from her surgical site    Denies any nausea vomiting chest pain palpitations headache paresthesias tingling or any focal weakness  No dysuria no frequency  All other systems reviewed and are negative  OBJECTIVE:     Vitals:     HR:  [] 90  Resp:  [16-18] 18  BP: ()/(58-86) 116/73  SpO2:  [91 %-100 %] 98 %  Temp (24hrs), Av 8 °F (37 1 °C), Min:97 8 °F (36 6 °C), Max:100 5 °F (38 1 °C)  Current: Temperature: 98 2 °F (36 8 °C)    Intake/Output Summary (Last 24 hours) at 18 1738  Last data filed at 18 1640   Gross per 24 hour   Intake             2130 ml   Output             1975 ml   Net              155 ml     Body mass index is 33 84 kg/m²  Physical Exam    Awake and alert not in any distress  Pallor JVP cyanosis negative  Slightly overweight obese  Tongue protrudes in midline slightly dry appearing  Clear lungs no rales or crackles  S1-S2 normal no murmurs  Abdomen is soft and nontender, bowel sounds present  Neurologically intact without any focal deficits  Slight limitation to use of her right hip due to immediate surgery  Local site is clean without any discharge drainage or dehiscence      Labs, Imaging, & Other studies:    All pertinent labs and imaging studies were personally reviewed    Results from last 7 days  Lab Units 18  0441   WBC Thousand/uL 13 18*   HEMOGLOBIN g/dL 11 6   PLATELETS Thousands/uL 126*       Results from last 7 days  Lab Units 18  0441   SODIUM mmol/L 136   POTASSIUM mmol/L 3 5   CHLORIDE mmol/L 102   CO2 mmol/L 26   ANION GAP mmol/L 8   BUN mg/dL 13   CREATININE mg/dL 0 62   EGFR ml/min/1 73sq m 97   GLUCOSE RANDOM mg/dL 125   CALCIUM mg/dL 8 4           Current Meds:  Current Facility-Administered Medications   Medication Dose Route Frequency Provider Last Rate Last Dose    acetaminophen (TYLENOL) tablet 650 mg  650 mg Oral Q6H PRN Faisal Calvo PA-C   650 mg at 18 1450    ascorbic acid (VITAMIN C) tablet 250 mg  250 mg Oral Daily Berna Montalvo MD   250 mg at 18 0805    aspirin chewable tablet 81 mg  81 mg Oral BID Faisal Calvo PA-C   81 mg at 07/11/18 1707    bisacodyl (DULCOLAX) rectal suppository 10 mg  10 mg Rectal Daily PRN Corinne Satchel, PA-C        calcium carbonate (TUMS) chewable tablet 1,000 mg  1,000 mg Oral Daily PRN Corinne Satchel, PA-C        celecoxib (CeleBREX) capsule 200 mg  200 mg Oral Daily Corinne Satchel, PA-C   200 mg at 07/11/18 0804    diphenhydrAMINE (BENADRYL) injection 50 mg  50 mg Intravenous Q6H PRN Corinne Satchel, PA-C        docusate sodium (COLACE) capsule 100 mg  100 mg Oral BID Corinne Satchel, PA-C   100 mg at 07/11/18 1707    ketorolac (TORADOL) injection 15 mg  15 mg Intravenous Q6H PRN Corinne Satisaurol, PA-C   15 mg at 07/10/18 2114    lactated ringers infusion  100 mL/hr Intravenous Continuous Corinne Farhat, PA-C   Stopped at 07/11/18 0800    magnesium oxide (MAG-OX) tablet 200 mg  200 mg Oral Daily Nancy Montiel MD   200 mg at 07/11/18 0805    methocarbamol (ROBAXIN) tablet 500 mg  500 mg Oral Q6H PRN Corinne Satisaurol, PA-C        morphine injection 2 mg  2 mg Intravenous Q2H PRN Corinne Satchel, PA-C        multivitamin-minerals (CENTRUM) tablet 1 tablet  1 tablet Oral Daily Corinne Satisaurol, PA-C   1 tablet at 07/11/18 0804    naloxone (NARCAN) injection 0 04 mg  0 04 mg Intravenous Q1MIN PRN Corinne Satchel, PA-C        ondansetron [facility]) injection 4 mg  4 mg Intravenous Q8H PRN Corinne Satisaurol, PA-C        oxyCODONE (ROXICODONE) immediate release tablet 10 mg  10 mg Oral Q4H PRN Corinne Satchel, PA-C   10 mg at 07/11/18 0805    oxyCODONE (ROXICODONE) IR tablet 5 mg  5 mg Oral Q4H PRN Corinne Satchel, PA-C   5 mg at 07/11/18 1450    sodium chloride 0 9 % infusion  125 mL/hr Intravenous Continuous Tomas Booker MD   Stopped at 07/10/18 1500    traMADol (ULTRAM) tablet 50 mg  50 mg Oral Q6H PRN Corinne Satchel, PA-C        valsartan (DIOVAN) tablet 40 mg  40 mg Oral Daily Nancy Montiel MD        venlafaxine [facility] ) 24 hr capsule 150 mg  150 mg Oral Daily Nancy Montiel MD   150 mg at 07/11/18 0804    zolpidem (AMBIEN) tablet 5 mg  5 mg Oral HS PRN Conception LILLIE Palmer         Home Meds:  Prescriptions Prior to Admission   Medication    Ascorbic Acid (VITAMIN C-ANNA HIPS ER) 1000 MG TBCR    b complex vitamins capsule    Cholecalciferol (VITAMIN D PO)    ibuprofen (ADVIL) 200 mg tablet    losartan (COZAAR) 50 mg tablet    Magnesium 200 MG TABS    venlafaxine (EFFEXOR-XR) 150 mg 24 hr capsule    Multiple Vitamin (MULTIVITAMINS PO)         VTE Pharmacologic Prophylaxis:  as per Primary Ortho Team   VTE Mechanical Prophylaxis: sequential compression device    Portions of the record may have been created with voice recognition software  Occasional wrong word or "sound a like" substitutions may have occurred due to the inherent limitations of voice recognition software  Read the chart carefully and recognize, using context, where substitutions have occurred

## 2018-07-12 LAB
ANION GAP SERPL CALCULATED.3IONS-SCNC: 7 MMOL/L (ref 4–13)
BUN SERPL-MCNC: 11 MG/DL (ref 5–25)
CALCIUM SERPL-MCNC: 8.3 MG/DL (ref 8.3–10.1)
CHLORIDE SERPL-SCNC: 102 MMOL/L (ref 100–108)
CO2 SERPL-SCNC: 28 MMOL/L (ref 21–32)
CREAT SERPL-MCNC: 0.53 MG/DL (ref 0.6–1.3)
GFR SERPL CREATININE-BSD FRML MDRD: 102 ML/MIN/1.73SQ M
GLUCOSE SERPL-MCNC: 120 MG/DL (ref 65–140)
POTASSIUM SERPL-SCNC: 3.8 MMOL/L (ref 3.5–5.3)
SODIUM SERPL-SCNC: 137 MMOL/L (ref 136–145)

## 2018-07-12 PROCEDURE — 97535 SELF CARE MNGMENT TRAINING: CPT

## 2018-07-12 PROCEDURE — 80048 BASIC METABOLIC PNL TOTAL CA: CPT | Performed by: PHYSICIAN ASSISTANT

## 2018-07-12 PROCEDURE — 97530 THERAPEUTIC ACTIVITIES: CPT

## 2018-07-12 PROCEDURE — 97116 GAIT TRAINING THERAPY: CPT

## 2018-07-12 PROCEDURE — 97110 THERAPEUTIC EXERCISES: CPT

## 2018-07-12 RX ORDER — ASPIRIN 81 MG/1
81 TABLET, CHEWABLE ORAL 2 TIMES DAILY
Refills: 0 | Status: ON HOLD
Start: 2018-07-12 | End: 2018-07-19

## 2018-07-12 RX ORDER — ACETAMINOPHEN 325 MG/1
650 TABLET ORAL EVERY 4 HOURS PRN
Qty: 30 TABLET | Refills: 0
Start: 2018-07-12 | End: 2019-07-26 | Stop reason: ALTCHOICE

## 2018-07-12 RX ORDER — CELECOXIB 200 MG/1
200 CAPSULE ORAL DAILY
Refills: 0 | Status: ON HOLD
Start: 2018-07-13 | End: 2018-07-19

## 2018-07-12 RX ADMIN — OXYCODONE HYDROCHLORIDE AND ACETAMINOPHEN 250 MG: 500 TABLET ORAL at 08:34

## 2018-07-12 RX ADMIN — DOCUSATE SODIUM 100 MG: 100 CAPSULE, LIQUID FILLED ORAL at 21:51

## 2018-07-12 RX ADMIN — ASPIRIN 81 MG 81 MG: 81 TABLET ORAL at 21:51

## 2018-07-12 RX ADMIN — OXYCODONE HYDROCHLORIDE 10 MG: 10 TABLET ORAL at 08:32

## 2018-07-12 RX ADMIN — VENLAFAXINE HYDROCHLORIDE 150 MG: 150 CAPSULE, EXTENDED RELEASE ORAL at 08:33

## 2018-07-12 RX ADMIN — ASPIRIN 81 MG 81 MG: 81 TABLET ORAL at 08:32

## 2018-07-12 RX ADMIN — VALSARTAN 40 MG: 80 TABLET ORAL at 08:33

## 2018-07-12 RX ADMIN — CELECOXIB 200 MG: 200 CAPSULE ORAL at 08:34

## 2018-07-12 RX ADMIN — TRAMADOL HYDROCHLORIDE 50 MG: 50 TABLET, FILM COATED ORAL at 14:35

## 2018-07-12 RX ADMIN — DOCUSATE SODIUM 100 MG: 100 CAPSULE, LIQUID FILLED ORAL at 08:34

## 2018-07-12 RX ADMIN — Medication 200 MG: at 08:32

## 2018-07-12 RX ADMIN — OXYCODONE HYDROCHLORIDE 10 MG: 10 TABLET ORAL at 02:50

## 2018-07-12 RX ADMIN — Medication 1 TABLET: at 08:34

## 2018-07-12 NOTE — PLAN OF CARE
Problem: PHYSICAL THERAPY ADULT  Goal: Performs mobility at highest level of function for planned discharge setting  See evaluation for individualized goals  Treatment/Interventions: Functional transfer training, LE strengthening/ROM, Elevations, Therapeutic exercise, Endurance training, Patient/family training, Equipment eval/education, Bed mobility, Gait training, Compensatory technique education, Spoke to nursing  Equipment Recommended: Other (Comment) (has RW at home )       See flowsheet documentation for full assessment, interventions and recommendations  Outcome: Progressing  Prognosis: Good  Problem List: Decreased strength, Decreased range of motion, Decreased endurance, Impaired balance, Decreased mobility, Orthopedic restrictions, Pain  Assessment: Pt found seated in bed side chair this AM and willing to participate in PT  Pt continues to demonstrate improvement with ambulation technique by progressing to step-through gait pattern without verbal cues  Pt also able to progress with ambulation distances to 71'x2 with Min A x1  Pt continues to require Min A x1 for all transfers and requires less verbal cues while abiding to all THP's during all aspects of mobility  Pt demonstrates greater ease with bed mobility sit>supine from L side of bed and was able to use sheet more sufficiently for RLE management  Pt able to progress THR HEP to 15 reps this AM and continues to require assistance with hip abd /add and slr  Pt was repositioned in supine with SCD's activated and call bell in reach  Offered ice pack however pt  Declined  Continue to recommend rehab at this time inorder to progress mobility and maximize functional mobility and I for safe d/c to home alone     Barriers to Discharge: Inaccessible home environment, Decreased caregiver support  Barriers to Discharge Comments: lives alone, 1 FÉLIX and 1 FOS to 2nd level  Recommendation: Short-term skilled PT     PT - OK to Discharge: Yes (to rehab when medically cleared)    See flowsheet documentation for full assessment

## 2018-07-12 NOTE — OCCUPATIONAL THERAPY NOTE
[address] Progress Note     Patient Name: Lakeisha Rubi  UWLNAEZ Date: 7/12/2018  Problem List  Patient Active Problem List   Diagnosis    Cervical radiculopathy    Irritable bowel syndrome    Nontoxic single thyroid nodule    Vitamin D deficiency    Vitamin B12 deficiency    Menopausal symptoms    Anxiety    Primary localized osteoarthritis of pelvic region and thigh         07/12/18 1423   Restrictions/Precautions   Weight Bearing Precautions Per Order Yes   RLE Weight Bearing Per Order WBAT   Other Precautions WBS;THR;Fall Risk;Pain   Lifestyle   Autonomy At baseline, pt was I w/ ADLs, IADLs, and functional mobility/transfers w/o use of DME/AD, (+) , FT employed, and reports 0 falls  Reciprocal Relationships Lives alone; Local family/friends   Service to Others FT employed as    Pain Assessment   Pain Assessment 0-10   Pain Score 4   Pain Type Surgical pain   Pain Location Hip   Pain Orientation Right   Pain Descriptors Aching   Pain Frequency Constant/continuous   Pain Onset Ongoing   Clinical Progression Gradually worsening   Effect of Pain on Daily Activities Increased pain with activity   Patient's Stated Pain Goal No pain   Hospital Pain Intervention(s) Repositioned; Ambulation/increased activity; Emotional support   Response to Interventions Tolerated   ADL   Where Assessed Chair   Grooming Assistance 5  Supervision/Setup   Grooming Deficit Setup;Supervision/safety; Increased time to complete   Grooming Comments Light grooming tasks completed with Supervision while standing at sink to wash/dry hands  UB Dressing Assistance 5  Supervision/Setup   UB Dressing Deficit Setup;Supervision/safety; Increased time to complete   LB Dressing Assistance 4  Minimal Assistance   LB Dressing Deficit Setup;Steadying; Requires assistive device for steadying;Verbal cueing;Supervision/safety; Increased time to complete;Pull up over hips   LB Dressing Comments Educated pt on use of [address] AE for LB ADLs through verbal instructions and demonstration with pt demonstrating good carryover of techniques  LB dressing completed with overall Min A  Pt able to don/doff socks and thread BLEs into underwear with Supervision only w/ use of sock aid, dressing stick, and reacher  Min A, however, required for steadying/balance when standing to pull underwear over hips 2* decreased dynamic standing balance demonstrated  Toileting Assistance  4  Minimal Assistance   Toileting Deficit Setup;Verbal cueing;Supervison/safety; Increased time to complete;Clothing management up;Clothing management down;Bedside commode   Toileting Comments Toileting completed with Min A  Supervision only for perineal hygiene while seated, however CGA required for balance when standing for clothing management up/down  Functional Standing Tolerance   Time 4 mins   Activity Functional mobilitytransfers   Comments No LOB noted   Bed Mobility   Supine to Sit 4  Minimal assistance   Additional items Assist x 1;HOB elevated; Bedrails; Increased time required;Verbal cues;LE management   Sit to Supine Unable to assess  (Pt seated OOB in chair at end of session)   Additional Comments Pt seated OOB in chair at end of session with call bell and phone within reach  All needs met and pt reports no further questions for OT at this time   Transfers   Sit to Stand 4  Minimal assistance   Additional items Assist x 1;Bedrails; Increased time required;Verbal cues   Stand to Sit 4  Minimal assistance   Additional items Assist x 1; Armrests; Increased time required;Verbal cues   Toilet transfer 5  Supervision   Additional items Assist x 1; Armrests; Increased time required;Verbal cues; Commode   Additional Comments Cues for safe technique and placement of hands and operative leg   Functional Mobility   Functional Mobility 4  Minimal assistance   Additional Comments Assist x1   Additional items Rolling walker   Toilet Transfers   Toilet Transfer From TuTanda walker Toilet Transfer Type To and from   Toilet Transfer to Standard bedside commode   Toilet Transfer Technique Stand pivot; Ambulating   Toilet Transfers Minimal assistance   Toilet Transfers Comments Assist x1   Cognition   Overall Cognitive Status WFL   Arousal/Participation Alert; Cooperative   Attention Within functional limits   Orientation Level Oriented X4   Memory Within functional limits   Following Commands Follows all commands and directions without difficulty   Comments Pt able to recall 3/3 THPs without cues from therapist   Activity Tolerance   Activity Tolerance Patient limited by fatigue;Patient limited by pain   Medical Staff Made Aware Pt appropriate to be seen per nursing   Assessment   Assessment Pt seen for OT treatment session this PM focusing on functional activity tolerance, bed mobility, ADLs with  AE, and functional mobility/transfers  Pt alert and cooperative throughout session  Pt able to state 3/3 THPs at start of session w/o cues from therapist  Pt lying supine at start of session, requiring Min A for supine>sit 2* assist for Jon Silvius management  Transfers (sit<>stand, RW<>BSC) completed with Min A-Supervision 2* with cues required for safe placement of hands and operative leg and safe technique  Min A required for steadying/balance with use of RW  Toileting completed with Min A  Supervision only for perineal hygiene while seated, however CGA required for balance when standing for clothing management up/down  Light grooming tasks completed with Supervision while standing at sink to wash/dry hands  Pt returned to bedside chair for dressing tasks  Educated pt on use of [address] AE for LB ADLs through verbal instructions and demonstration with pt demonstrating good carryover of techniques  LB dressing completed with overall Min A  Pt able to don/doff socks and thread BLEs into underwear with Supervision only w/ use of sock aid, dressing stick, and reacher   Min A, however, required for steadying/balance when standing to pull underwear over hips 2* decreased dynamic standing balance demonstrated  Pt seated OOB in chair at end of session with call bell and phone within reach  All needs met and pt reports no further questions for OT at this time  Continue to recommend STR vs Home OT at this time, w/ pt preferring STR  OT to continue to follow pt on caseload  Plan   Treatment Interventions ADL retraining;Functional transfer training; Endurance training;Patient/family training;Equipment evaluation/education; Compensatory technique education;Continued evaluation; Energy conservation; Activityengagement   Goal Expiration Date 07/21/18   Treatment Day 2   OT Frequency 3-5x/wk   Recommendation   OT Discharge Recommendation Short Term Rehab  (vs Home OT;  Pt prefers STR)   OT - OK to Discharge Yes  (when medically cleared to STR)   Barthel Index   Feeding 10   Bathing 0   Grooming Score 5   Dressing Score 5   Bladder Score 10   Bowels Score 10   Toilet Use Score 5   Transfers (Bed/Chair) Score 10   Mobility (Level Surface) Score 0   Stairs Score 0   Barthel Index Score 55   Modified [place] Scale   Modified Luis Scale 4       Brenda Gil, OTR/L

## 2018-07-12 NOTE — PROGRESS NOTES
Progress Note - Internal Medicine   Sudhaguillermo Rodriguez Re 59 y o  female MRN: [id number]  Unit/Bed#: E2 -01 Encounter: [id number]      Impression :    POD #2, elective right total hip replacement by Dr Tran Yang  Advanced end-stage osteoarthritis right hip, failed conservative treatments  Obesity with BMI of 33 84  Benign essential hypertension- well controlled without meds ! Preoperative mild thrombocytopenia, platelet count 789 on 06/19/2018 and 126 today  Ambulatory dysfunction  Anxiety disorder  History of irritable bowel syndrome  Chronic lower back pain due to scoliosis and DJD  History of  B12 deficiency/vitamin-D deficiency  Severe multilevel degenerative changes complicated by left convex thoracolumbar rotoscoliosis and kyphosis     Severe left L4-5 foraminal stenosis  Mild hypokalemia, potassium 3 5 --->> 3 8  Mild leukocytosis at 13,000    Recommendation:    · Patient is hemodynamically stable as evident on the flow sheets  Patients  pain is well controlled with current analgesics  · Medically stable for discharge with out patient follow ups as arranged by  once cleared by Therapy and Orthopedic team   · Continue DVT prophylaxis as per surgical team with ASA PO BID  · Monitor for any redness/ drainage / swelling around site of surgery and to notify Orthopedic team or PCP  · Recommend life style modifications and any high impact activities  · Continue PT /OT to improve endurance, range of motion, gait stabilization and use of assist device in a safe manner  · Recheck Hemoglobin and hematocrit  through PCP in 1 week upon discharge  · Full fall and orthostatic precautions  Subjective:     Patient seen and examined today  EMR and overnight events reviewed  Patient states that she had mild pain earlier but after pain medications that has stabilized  She is awaiting to be discharged to acute short-term rehabilitation    She states that she lives alone and will have difficulty if she would go as she is steps and her bathroom is upstairs  Patient's review of system is fairly unremarkable at this time  Review of Systems     Constitutional: Denies appetite change  No chills, diaphoresis, fatigue or fever  HEENT: No congestion, sore throat  No visual complaints  Respiratory: No cough, chest tightness, shortness of breath and wheezing  Cardiovascular: No chest pain and palpitations  No Syncope or grey out  GI: Negative for abdominal distention, pain, constipation, diarrhea or nausea  Endocrine: Negative for cold or heat intolerance, polydipsia and polyuria  Genitourinary: Negative for decreased urine volume, dysuria, flank pain and urgency  Skin: Negative for rash  Neurological: Negative for dizziness, weakness, numbness and headaches  Hematological: Negative for spontaneous bruising or bleeding  Psychiatric: Negative for confusion, dysphoric mood, hallucinations  All other systems reviewed and are negative  OBJECTIVE:     Vitals:     HR:  [74-94] 74  Resp:  [16-18] 16  BP: (101-121)/(55-73) 101/55  SpO2:  [95 %-100 %] 98 %  Temp (24hrs), Av 8 °F (36 6 °C), Min:96 2 °F (35 7 °C), Max:98 7 °F (37 1 °C)  Current: Temperature: (!) 96 2 °F (35 7 °C)    Intake/Output Summary (Last 24 hours) at 18 1223  Last data filed at 18 0900   Gross per 24 hour   Intake              480 ml   Output             1525 ml   Net            -1045 ml     Body mass index is 33 84 kg/m²  Physical Exam      General Appearance:  Awake,alert, cooperative  Not in distress  Pallor / Icterus/ Cyanosis negative  Oropharynx no thrush  Tongue protrudes in midline  Head:  Normocephalic, atraumatic  No titubations  Eyes:  No eye redness or discharge bilaterally  Neck: Supple, no raised JVP  Back:   Symmetric, no kypho-scoliosis  Lungs:   B/L Clear to auscultation, no wheezing or rhonchi  Normal broncho-alveolar air entry  No pleural rubs     Heart:   Regular S1S2, A2P2 normal, no murmur or gallop  Abdomen:   Soft, non-tender,no rebound, bowel sounds+  Musculoskeletal: Extremities no cyanosis or edema  Surgical site on the operated right hip has clean dressing  No discharge or drainage  Mild diminution and limited range of motion   Psych: Normal Affect / No hallucinations or delusions  Neurologic:             Skin:  Endocrine:  Vascular: Awake and alert  Mentation intact  Speech is intact  Facial symmetry is maintained  Good shoulder shrug and hand grasp  Muscle strength is 5/5 in all muscle groups except on operated right lower limb which is limited due to recent surgery  Light touch and temperature sensation is maintained  No rashes /purpura  No open wounds  No thyromegaly / no lid lag  Homans sign is negative  B/L Calf are supple and non tender         Labs, Imaging, & Other studies:    All pertinent labs and imaging studies were personally reviewed    Results from last 7 days  Lab Units 07/11/18  0441   WBC Thousand/uL 13 18*   HEMOGLOBIN g/dL 11 6   PLATELETS Thousands/uL 126*       Results from last 7 days  Lab Units 07/12/18  0537 07/11/18  0441   SODIUM mmol/L 137 136   POTASSIUM mmol/L 3 8 3 5   CHLORIDE mmol/L 102 102   CO2 mmol/L 28 26   ANION GAP mmol/L 7 8   BUN mg/dL 11 13   CREATININE mg/dL 0 53* 0 62   EGFR ml/min/1 73sq m 102 97   GLUCOSE RANDOM mg/dL 120 125   CALCIUM mg/dL 8 3 8 4           Current Meds:  Current Facility-Administered Medications   Medication Dose Route Frequency Provider Last Rate Last Dose    acetaminophen (TYLENOL) tablet 650 mg  650 mg Oral Q6H PRN Adrian Jay PA-C   650 mg at 07/11/18 1450    ascorbic acid (VITAMIN C) tablet 250 mg  250 mg Oral Daily Cody Alarcon MD   250 mg at 07/12/18 0834    aspirin chewable tablet 81 mg  81 mg Oral BID Adrian Jay PA-C   81 mg at 07/12/18 9729    bisacodyl (DULCOLAX) rectal suppository 10 mg  10 mg Rectal Daily PRN Adrian Jay PA-C        calcium carbonate (TUMS) chewable tablet 1,000 mg  1,000 mg Oral Daily PRN Stalin Codington, PA-C        celecoxib (CeleBREX) capsule 200 mg  200 mg Oral Daily Stalin Codington, PA-C   200 mg at 07/12/18 7505    docusate sodium (COLACE) capsule 100 mg  100 mg Oral BID Stalin Codington, PA-C   100 mg at 07/12/18 0834    ketorolac (TORADOL) injection 15 mg  15 mg Intravenous Q6H PRN Stalin [place], PA-C   15 mg at 07/10/18 2114    magnesium oxide (MAG-OX) tablet 200 mg  200 mg Oral Daily Lyudmila Banuelos MD   200 mg at 07/12/18 3122    methocarbamol (ROBAXIN) tablet 500 mg  500 mg Oral Q6H PRN Stalin Codington, PA-C   500 mg at 07/11/18 2044    morphine injection 2 mg  2 mg Intravenous Q2H PRN Stalin [place], PA-C        multivitamin-minerals (CENTRUM) tablet 1 tablet  1 tablet Oral Daily Stalin Codington, PA-C   1 tablet at 07/12/18 0834    naloxone (NARCAN) injection 0 04 mg  0 04 mg Intravenous Q1MIN PRN Stalin [place], PA-C        ondansetron [facility]) injection 4 mg  4 mg Intravenous Q8H PRN Stalin [place], PA-C        oxyCODONE (ROXICODONE) immediate release tablet 10 mg  10 mg Oral Q4H PRN Stalin Codington, PA-C   10 mg at 07/12/18 4246    oxyCODONE (ROXICODONE) IR tablet 5 mg  5 mg Oral Q4H PRN Stalin Codington, PA-C   5 mg at 07/11/18 1450    traMADol (ULTRAM) tablet 50 mg  50 mg Oral Q6H PRN Stalin Codington, PA-C        valsartan (DIOVAN) tablet 40 mg  40 mg Oral Daily Lyudmila Banuelos MD   40 mg at 07/12/18 0833    venlafaxine (EFFEXOR-XR) 24 hr capsule 150 mg  150 mg Oral Daily Lyudmila Banuelos MD   150 mg at 07/12/18 1282     Home Meds:  Prescriptions Prior to Admission   Medication    Ascorbic Acid (VITAMIN C-ANNA HIPS ER) 1000 MG TBCR    b complex vitamins capsule    Cholecalciferol (VITAMIN D PO)    ibuprofen (ADVIL) 200 mg tablet    losartan (COZAAR) 50 mg tablet    Magnesium 200 MG TABS    venlafaxine (EFFEXOR-XR) 150 mg 24 hr capsule    Multiple Vitamin (MULTIVITAMINS PO)       Continuous Infusions:       Invasive Devices Peripheral Intravenous Line            Peripheral IV 07/10/18 Left Hand 2 days                VTE Pharmacologic Prophylaxis: ASA as per Primary Ortho Team   VTE Mechanical Prophylaxis: sequential compression device    Portions of the record may have been created with voice recognition software  Occasional wrong word or "sound a like" substitutions may have occurred due to the inherent limitations of voice recognition software  Read the chart carefully and recognize, using context, where substitutions have occurred

## 2018-07-12 NOTE — PHYSICAL THERAPY NOTE
Physical Therapy Progress Note     07/12/18 1135   Pain Assessment   Pain Assessment 0-10   Pain Score 5   Pain Type Surgical pain   Pain Location Hip   Pain Orientation Right   Pain Frequency Constant/continuous   Pain Onset Ongoing   Clinical Progression Not changed   Effect of Pain on Daily Activities Did not change with activity   Hospital Pain Intervention(s) Repositioned; Ambulation/increased activity; Rest;Emotional support   Response to Interventions Tolerated   Precautions   Total Hip Replacement ADduction; Internal rotation; Flexion   Restrictions/Precautions   Weight Bearing Precautions Per Order Yes   RLE Weight Bearing Per Order WBAT   Other Precautions WBS;THR;Fall Risk;Pain   General   Chart Reviewed Yes   Response to Previous Treatment Patient with no complaints from previous session  Family/Caregiver Present No   Cognition   Overall Cognitive Status WFL   Arousal/Participation Alert; Responsive; Cooperative   Attention Within functional limits   Orientation Level Oriented X4   Memory Within functional limits   Following Commands Follows all commands and directions without difficulty   Subjective   Subjective "I've been having pain in my R knee "   Bed Mobility   Sit to Supine 4  Minimal assistance   Additional items Assist x 1;Leg ; Increased time required;Verbal cues;LE management   Additional Comments Improvement noted from getting in L side of bed in order to increase weight bearing through L leg  Transfers   Sit to Stand 4  Minimal assistance   Additional items Assist x 1; Armrests; Increased time required;Verbal cues   Stand to Sit 4  Minimal assistance   Additional items Assist x 1; Increased time required;Verbal cues   Ambulation/Elevation   Gait pattern Decreased foot clearance; Short stride; Step to;Excessively slow;Decreased R stance  (initially step to   Progresses to step through w/o cues)   Gait Assistance 4  Minimal assist   Additional items Assist x 1;Verbal cues   Assistive Device Bariatric Rolling walker   Distance 71'x2   Balance   Static Sitting Good   Dynamic Sitting Fair +   Static Standing Fair   Ambulatory Poor +   Higher level balance Side stepping   Endurance Deficit   Endurance Deficit Yes   Endurance Deficit Description Pain and fatigue   Activity Tolerance   Activity Tolerance Patient limited by fatigue;Patient limited by pain   Nurse Shanae Oakes RN   Exercises   THR Supine;15 reps;AAROM;AROM; Right  (AAROM for hip abduction/adduction and SLR)   Assessment   Prognosis Good   Problem List Decreased strength;Decreased range of motion;Decreased endurance; Impaired balance;Decreased mobility;Orthopedic restrictions;Pain   Assessment Pt found seated in bed side chair this AM and willing to participate in PT  Pt continues to demonstrate improvement with ambulation technique by progressing to step-through gait pattern without verbal cues  Pt also able to progress with ambulation distances to 71'x2 with Min A x1  Pt continues to require Min A x1 for all transfers and requires less verbal cues while abiding to all THP's during all aspects of mobility  Pt demonstrates greater ease with bed mobility sit>supine from L side of bed and was able to use sheet more sufficiently for RLE management  Pt able to progress THR HEP to 15 reps this AM and continues to require assistance with hip abd /add and slr  Pt was repositioned in supine with SCD's activated and call bell in reach  Offered ice pack however pt  Declined  Continue to recommend rehab at this time inorder to progress mobility and maximize functional mobility and I for safe d/c to home alone      Barriers to Discharge Inaccessible home environment;Decreased caregiver support   Barriers to Discharge Comments lives alone, 1 FÉLIX and 1 FOS to 2nd level   Goals   Patient Goals to go to rehab   STG Expiration Date 07/20/18   Treatment Day 4   Plan   Treatment/Interventions Functional transfer training;TEX strengthening/ROM; Elevations; Therapeutic exercise; Endurance training;Equipment eval/education; Bed mobility;Gait training;Patient/family training;Spoke to nursing   Progress Progressing toward goals   PT Frequency 7x/wk; Twice a day   Recommendation   Recommendation Short-term skilled PT   PT - OK to Discharge Yes  (to rehab when medically cleared)     Jigar Hubbard PTA Student    Treatment time 11:28-12:06

## 2018-07-12 NOTE — PLAN OF CARE
Problem: OCCUPATIONAL THERAPY ADULT  Goal: Performs self-care activities at highest level of function for planned discharge setting  See evaluation for individualized goals  Treatment Interventions: ADL retraining, Functional transfer training, Endurance training, Patient/family training, Equipment evaluation/education, Compensatory technique education, Continued evaluation, Energy conservation, Activityengagement          See flowsheet documentation for full assessment, interventions and recommendations  Outcome: Progressing  Limitation: Decreased ADL status, Decreased endurance, Decreased self-care trans, Decreased high-level ADLs  Prognosis: Fair  Assessment: Pt seen for OT treatment session this PM focusing on functional activity tolerance, bed mobility, ADLs with  AE, and functional mobility/transfers  Pt alert and cooperative throughout session  Pt able to state 3/3 THPs at start of session w/o cues from therapist  Pt lying supine at start of session, requiring Min A for supine>sit 2* assist for [place]  management  Transfers (sit<>stand, RW<>BSC) completed with Min A-Supervision 2* with cues required for safe placement of hands and operative leg and safe technique  Min A required for steadying/balance with use of RW  Toileting completed with Min A  Supervision only for perineal hygiene while seated, however CGA required for balance when standing for clothing management up/down  Light grooming tasks completed with Supervision while standing at sink to wash/dry hands  Pt returned to bedside chair for dressing tasks  Educated pt on use of [facility] AE for LB ADLs through verbal instructions and demonstration with pt demonstrating good carryover of techniques  LB dressing completed with overall Min A  Pt able to don/doff socks and thread BLEs into underwear with Supervision only w/ use of sock aid, dressing stick, and reacher   Min A, however, required for steadying/balance when standing to pull underwear over hips 2* decreased dynamic standing balance demonstrated  Pt seated OOB in chair at end of session with call bell and phone within reach  All needs met and pt reports no further questions for OT at this time  Continue to recommend STR vs Home OT at this time, w/ pt preferring STR  OT to continue to follow pt on caseload  OT Discharge Recommendation: Short Term Rehab (vs Home OT;  Pt prefers STR)  OT - OK to Discharge: Yes (when medically cleared to STR)

## 2018-07-12 NOTE — SOCIAL WORK
Patient accepted at [facility]; Ann-Marie Human from [place] submitted for authorization  CM awaiting insurance determination

## 2018-07-12 NOTE — CASE MANAGEMENT
Continued Stay Review    Date: 7/12/18    Vital Signs: /55 (BP Location: Left arm)   Pulse 74   Temp (!) 96 2 °F (35 7 °C) (Temporal)   Resp 16   Ht 5' 2" (1 575 m)   Wt 83 9 kg (185 lb)   SpO2 98%   BMI 33 84 kg/m²     Medications:   Scheduled Meds:   Current Facility-Administered Medications:  acetaminophen 650 mg Oral Q6H PRN   ascorbic acid 250 mg Oral Daily   aspirin 81 mg Oral BID   bisacodyl 10 mg Rectal Daily PRN   calcium carbonate 1,000 mg Oral Daily PRN   celecoxib 200 mg Oral Daily   docusate sodium 100 mg Oral BID   magnesium oxide 200 mg Oral Daily   methocarbamol 500 mg Oral Q6H PRN   morphine injection 2 mg Intravenous Q2H PRN   multivitamin-minerals 1 tablet Oral Daily   naloxone 0 04 mg Intravenous Q1MIN PRN   ondansetron 4 mg Intravenous Q8H PRN   oxyCODONE 10 mg Oral Q4H PRN   oxyCODONE 5 mg Oral Q4H PRN   traMADol 50 mg Oral Q6H PRN   valsartan 40 mg Oral Daily   venlafaxine 150 mg Oral Daily       Abnormal Labs/Diagnostic Results:  None recent  Age/Sex: 59 y o  female     Assessment/Plan:     Discharge Plan: Rehab, pending insurance determination

## 2018-07-13 ENCOUNTER — HOSPITAL ENCOUNTER (INPATIENT)
Facility: HOSPITAL | Age: 63
LOS: 6 days | Discharge: HOME HEALTH | DRG: 561 | End: 2018-07-19
Attending: FAMILY MEDICINE | Admitting: FAMILY MEDICINE
Payer: COMMERCIAL

## 2018-07-13 VITALS
RESPIRATION RATE: 18 BRPM | HEART RATE: 80 BPM | DIASTOLIC BLOOD PRESSURE: 61 MMHG | TEMPERATURE: 96 F | BODY MASS INDEX: 34.04 KG/M2 | WEIGHT: 185 LBS | SYSTOLIC BLOOD PRESSURE: 97 MMHG | OXYGEN SATURATION: 98 % | HEIGHT: 62 IN

## 2018-07-13 DIAGNOSIS — Z96.641 HX OF TOTAL HIP ARTHROPLASTY, RIGHT: ICD-10-CM

## 2018-07-13 DIAGNOSIS — K59.00 CONSTIPATION: Primary | ICD-10-CM

## 2018-07-13 DIAGNOSIS — M16.10 PRIMARY LOCALIZED OSTEOARTHRITIS OF PELVIC REGION AND THIGH: ICD-10-CM

## 2018-07-13 PROBLEM — M54.9 CHRONIC BACK PAIN: Status: ACTIVE | Noted: 2018-07-13

## 2018-07-13 PROBLEM — M54.9 CHRONIC BACK PAIN: Chronic | Status: ACTIVE | Noted: 2018-07-13

## 2018-07-13 PROBLEM — G89.29 CHRONIC BACK PAIN: Status: ACTIVE | Noted: 2018-07-13

## 2018-07-13 PROBLEM — E66.9 CLASS 1 OBESITY IN ADULT: Chronic | Status: ACTIVE | Noted: 2018-07-13

## 2018-07-13 PROBLEM — E66.811 CLASS 1 OBESITY IN ADULT: Chronic | Status: ACTIVE | Noted: 2018-07-13

## 2018-07-13 PROBLEM — E66.9 CLASS 1 OBESITY IN ADULT: Status: ACTIVE | Noted: 2018-07-13

## 2018-07-13 PROBLEM — G89.29 CHRONIC BACK PAIN: Chronic | Status: ACTIVE | Noted: 2018-07-13

## 2018-07-13 PROBLEM — D72.829 LEUKOCYTOSIS: Status: ACTIVE | Noted: 2018-07-13

## 2018-07-13 PROBLEM — E66.811 CLASS 1 OBESITY IN ADULT: Status: ACTIVE | Noted: 2018-07-13

## 2018-07-13 LAB
ANION GAP SERPL CALCULATED.3IONS-SCNC: 5 MMOL/L (ref 4–13)
BUN SERPL-MCNC: 9 MG/DL (ref 5–25)
CALCIUM SERPL-MCNC: 8.8 MG/DL (ref 8.3–10.1)
CHLORIDE SERPL-SCNC: 103 MMOL/L (ref 100–108)
CO2 SERPL-SCNC: 30 MMOL/L (ref 21–32)
CREAT SERPL-MCNC: 0.61 MG/DL (ref 0.6–1.3)
GFR SERPL CREATININE-BSD FRML MDRD: 97 ML/MIN/1.73SQ M
GLUCOSE SERPL-MCNC: 113 MG/DL (ref 65–140)
POTASSIUM SERPL-SCNC: 4.1 MMOL/L (ref 3.5–5.3)
SODIUM SERPL-SCNC: 138 MMOL/L (ref 136–145)

## 2018-07-13 PROCEDURE — 97530 THERAPEUTIC ACTIVITIES: CPT

## 2018-07-13 PROCEDURE — 97116 GAIT TRAINING THERAPY: CPT

## 2018-07-13 PROCEDURE — 97110 THERAPEUTIC EXERCISES: CPT

## 2018-07-13 PROCEDURE — 80048 BASIC METABOLIC PNL TOTAL CA: CPT | Performed by: PHYSICIAN ASSISTANT

## 2018-07-13 PROCEDURE — 99223 1ST HOSP IP/OBS HIGH 75: CPT | Performed by: HOSPITALIST

## 2018-07-13 RX ORDER — SENNOSIDES 8.6 MG
1 TABLET ORAL DAILY
Status: DISCONTINUED | OUTPATIENT
Start: 2018-07-14 | End: 2018-07-19 | Stop reason: HOSPADM

## 2018-07-13 RX ORDER — VALSARTAN 40 MG/1
80 TABLET ORAL DAILY
Status: DISCONTINUED | OUTPATIENT
Start: 2018-07-14 | End: 2018-07-19 | Stop reason: HOSPADM

## 2018-07-13 RX ORDER — VENLAFAXINE HYDROCHLORIDE 150 MG/1
150 CAPSULE, EXTENDED RELEASE ORAL DAILY
Status: DISCONTINUED | OUTPATIENT
Start: 2018-07-14 | End: 2018-07-19 | Stop reason: HOSPADM

## 2018-07-13 RX ORDER — OXYCODONE HYDROCHLORIDE 5 MG/1
10 TABLET ORAL EVERY 6 HOURS PRN
Status: DISPENSED | OUTPATIENT
Start: 2018-07-13 | End: 2018-07-16

## 2018-07-13 RX ORDER — MELATONIN
4000 DAILY
Status: DISCONTINUED | OUTPATIENT
Start: 2018-07-14 | End: 2018-07-19 | Stop reason: HOSPADM

## 2018-07-13 RX ORDER — ASPIRIN 81 MG/1
81 TABLET, CHEWABLE ORAL 2 TIMES DAILY
Status: DISCONTINUED | OUTPATIENT
Start: 2018-07-13 | End: 2018-07-19 | Stop reason: HOSPADM

## 2018-07-13 RX ORDER — TRAMADOL HYDROCHLORIDE 50 MG/1
50 TABLET ORAL EVERY 6 HOURS PRN
Status: DISCONTINUED | OUTPATIENT
Start: 2018-07-13 | End: 2018-07-19 | Stop reason: HOSPADM

## 2018-07-13 RX ORDER — CELECOXIB 200 MG/1
200 CAPSULE ORAL DAILY
Status: DISCONTINUED | OUTPATIENT
Start: 2018-07-14 | End: 2018-07-19 | Stop reason: HOSPADM

## 2018-07-13 RX ORDER — DOCUSATE SODIUM 100 MG/1
100 CAPSULE, LIQUID FILLED ORAL 2 TIMES DAILY
Status: DISCONTINUED | OUTPATIENT
Start: 2018-07-13 | End: 2018-07-19 | Stop reason: HOSPADM

## 2018-07-13 RX ORDER — MAGNESIUM 200 MG
200 TABLET ORAL DAILY
Status: DISCONTINUED | OUTPATIENT
Start: 2018-07-14 | End: 2018-07-14 | Stop reason: CLARIF

## 2018-07-13 RX ORDER — ACETAMINOPHEN 325 MG/1
650 TABLET ORAL EVERY 6 HOURS PRN
Status: DISCONTINUED | OUTPATIENT
Start: 2018-07-13 | End: 2018-07-19 | Stop reason: HOSPADM

## 2018-07-13 RX ADMIN — OXYCODONE HYDROCHLORIDE 10 MG: 10 TABLET ORAL at 04:50

## 2018-07-13 RX ADMIN — ASPIRIN 81 MG 81 MG: 81 TABLET ORAL at 17:34

## 2018-07-13 RX ADMIN — METHOCARBAMOL 500 MG: 500 TABLET ORAL at 17:34

## 2018-07-13 RX ADMIN — DOCUSATE SODIUM 100 MG: 100 CAPSULE, LIQUID FILLED ORAL at 22:08

## 2018-07-13 RX ADMIN — DOCUSATE SODIUM 100 MG: 100 CAPSULE, LIQUID FILLED ORAL at 17:34

## 2018-07-13 RX ADMIN — Medication 1 TABLET: at 08:57

## 2018-07-13 RX ADMIN — ASPIRIN 81 MG 81 MG: 81 TABLET ORAL at 08:58

## 2018-07-13 RX ADMIN — OXYCODONE HYDROCHLORIDE 10 MG: 5 TABLET ORAL at 22:08

## 2018-07-13 RX ADMIN — OXYCODONE HYDROCHLORIDE 10 MG: 10 TABLET ORAL at 17:34

## 2018-07-13 RX ADMIN — TRAMADOL HYDROCHLORIDE 50 MG: 50 TABLET, FILM COATED ORAL at 08:57

## 2018-07-13 RX ADMIN — OXYCODONE HYDROCHLORIDE 5 MG: 5 TABLET ORAL at 00:22

## 2018-07-13 RX ADMIN — Medication 200 MG: at 08:58

## 2018-07-13 RX ADMIN — DOCUSATE SODIUM 100 MG: 100 CAPSULE, LIQUID FILLED ORAL at 08:57

## 2018-07-13 RX ADMIN — VALSARTAN 40 MG: 80 TABLET ORAL at 08:57

## 2018-07-13 RX ADMIN — CELECOXIB 200 MG: 200 CAPSULE ORAL at 08:56

## 2018-07-13 RX ADMIN — OXYCODONE HYDROCHLORIDE AND ACETAMINOPHEN 250 MG: 500 TABLET ORAL at 08:58

## 2018-07-13 RX ADMIN — VENLAFAXINE HYDROCHLORIDE 150 MG: 150 CAPSULE, EXTENDED RELEASE ORAL at 08:57

## 2018-07-13 RX ADMIN — ASPIRIN 81 MG 81 MG: 81 TABLET ORAL at 22:08

## 2018-07-13 RX ADMIN — METHOCARBAMOL 500 MG: 500 TABLET ORAL at 08:57

## 2018-07-13 RX ADMIN — OXYCODONE HYDROCHLORIDE 10 MG: 10 TABLET ORAL at 11:11

## 2018-07-13 NOTE — PROGRESS NOTES
Progress Note - Internal Medicine   Matt Rodriguez Re 59 y o  female MRN: [id number]  Unit/Bed#: E2 -01 Encounter: [id number]      Impression :    POD # 3-  elective right total hip replacement by Dr Martha Obrien  Advanced end-stage osteoarthritis right hip, failed conservative treatments  Obesity with BMI of 33 84  Benign essential hypertension- well controlled without meds !   Preoperative mild thrombocytopenia, platelet count 325 on 06/19/2018 and 126 today  Ambulatory dysfunction  Anxiety disorder, stable  History of irritable bowel syndrome  Chronic lower back pain due to scoliosis and DJD  B12 deficiency/vitamin-D deficiency  Severe multilevel degenerative changes complicated by left convex thoracolumbar rotoscoliosis and kyphosis     Severe left L4-5 foraminal stenosis  Mild hypokalemia, potassium 3 5 --->> 3 8 ---> 4 1      Recommendation:    Discussed with case management, patient has been denied transferred to [facility] by her insurance company  Patient is looking into other options  Medically she is stable and can be released from our perspective she will need to continue postoperative care as recommended by her primary orthopedic service  We discussed with her about her low potassium and close monitoring of her outpatient conditions through her primary care physician  She will need B12 and vitamin-D supplements and follow-up regarding her IBS and low platelet count  Long-term weight loss would be beneficial continue physical therapy  Hold her Ace inhibitors if her blood pressure is controlled  Subjective:     Patient seen and examined today  EMR and overnight events reviewed  No new complaints resting in bed denies any chest pain palpitation diaphoresis syncope gray out bleeding from her surgical site no dysuria no frequency mood is good awaiting disposition  All other systems reviewed and are negative         OBJECTIVE:     Vitals:     HR:  [77-86] 86  Resp:  [16-18] 18  BP: (102-123)/(52-77) 118/77  SpO2:  [96 %-99 %] 99 %  Temp (24hrs), Av 6 °F (37 °C), Min:97 8 °F (36 6 °C), Max:99 9 °F (37 7 °C)  Current: Temperature: 97 8 °F (36 6 °C)    Intake/Output Summary (Last 24 hours) at 18 1134  Last data filed at 18 0416   Gross per 24 hour   Intake                0 ml   Output             1400 ml   Net            -1400 ml     Body mass index is 33 84 kg/m²  Physical Exam    Resting comfortably not in any distress  Pallor JVP cyanosis negative  Peripheral edema negative  Bilateral lungs clear no rales or crackles  S1-S2 normal no murmurs  Abdomen is soft nontender  Right hip laterally has clean dressing with discharge drainage dehiscence  No redness  Bilateral Homans sign is negative distal pulses are intact  Skin is dry and warm  Mood is pleasant and cheerful      Labs, Imaging, & Other studies:    All pertinent labs and imaging studies were personally reviewed    Results from last 7 days  Lab Units 18  0441   WBC Thousand/uL 13 18*   HEMOGLOBIN g/dL 11 6   PLATELETS Thousands/uL 126*       Results from last 7 days  Lab Units 18  0439 18  0537 18  0441   SODIUM mmol/L 138 137 136   POTASSIUM mmol/L 4 1 3 8 3 5   CHLORIDE mmol/L 103 102 102   CO2 mmol/L 30 28 26   ANION GAP mmol/L 5 7 8   BUN mg/dL 9 11 13   CREATININE mg/dL 0 61 0 53* 0 62   EGFR ml/min/1 73sq m 97 102 97   GLUCOSE RANDOM mg/dL 113 120 125   CALCIUM mg/dL 8 8 8 3 8 4           Current Meds:  Current Facility-Administered Medications   Medication Dose Route Frequency Provider Last Rate Last Dose    acetaminophen (TYLENOL) tablet 650 mg  650 mg Oral Q6H PRN DAI Diane-C   650 mg at 18 1450    ascorbic acid (VITAMIN C) tablet 250 mg  250 mg Oral Daily Taras Villanueva MD   250 mg at 18 0858    aspirin chewable tablet 81 mg  81 mg Oral BID DIA Diane-C   81 mg at 18 0858    bisacodyl (DULCOLAX) rectal suppository 10 mg  10 mg Rectal Daily PRN Arielle Cheadle, PA-C        calcium carbonate (TUMS) chewable tablet 1,000 mg  1,000 mg Oral Daily PRN Arielle Cheadle, PA-C        celecoxib (CeleBREX) capsule 200 mg  200 mg Oral Daily Arielle Cheadle, PA-C   200 mg at 07/13/18 0856    docusate sodium (COLACE) capsule 100 mg  100 mg Oral BID Arielle Ciprianole, PA-C   100 mg at 07/13/18 0857    magnesium oxide (MAG-OX) tablet 200 mg  200 mg Oral Daily Teresa Rees MD   200 mg at 07/13/18 0858    methocarbamol (ROBAXIN) tablet 500 mg  500 mg Oral Q6H PRN Arielle Chepascualle, PA-C   500 mg at 07/13/18 0857    morphine injection 2 mg  2 mg Intravenous Q2H PRN Arielle Cheadle, PA-C        multivitamin-minerals (CENTRUM) tablet 1 tablet  1 tablet Oral Daily Arielle Cheadle, PA-C   1 tablet at 07/13/18 0857    naloxone (NARCAN) injection 0 04 mg  0 04 mg Intravenous Q1MIN PRN Arielle Cheadle, PA-C        ondansetron [facility]) injection 4 mg  4 mg Intravenous Q8H PRN Arielle Cheadle, PA-C        oxyCODONE (ROXICODONE) immediate release tablet 10 mg  10 mg Oral Q4H PRN Arielle Cheadle, PA-C   10 mg at 07/13/18 1111    oxyCODONE (ROXICODONE) IR tablet 5 mg  5 mg Oral Q4H PRN Arielle Cheadle, PA-C   5 mg at 07/13/18 0022    traMADol (ULTRAM) tablet 50 mg  50 mg Oral Q6H PRN Arielle Cheadle, PA-C   50 mg at 07/13/18 0857    valsartan (DIOVAN) tablet 40 mg  40 mg Oral Daily Teresa Rees MD   40 mg at 07/13/18 0857    venlafaxine (EFFEXOR-XR) 24 hr capsule 150 mg  150 mg Oral Daily Teresa Rees MD   150 mg at 07/13/18 0857     Home Meds:  Prescriptions Prior to Admission   Medication    Ascorbic Acid (VITAMIN C-ANNA HIPS ER) 1000 MG TBCR    b complex vitamins capsule    Cholecalciferol (VITAMIN D PO)    ibuprofen (ADVIL) 200 mg tablet    losartan (COZAAR) 50 mg tablet    Magnesium 200 MG TABS    venlafaxine (EFFEXOR-XR) 150 mg 24 hr capsule    Multiple Vitamin (MULTIVITAMINS PO)       Continuous Infusions:       Invasive Devices     Peripheral Intravenous Line            Peripheral IV 07/10/18 Left Hand 3 days                VTE Pharmacologic Prophylaxis:  as per Primary Ortho Team   VTE Mechanical Prophylaxis: foot pump applied    Portions of the record may have been created with voice recognition software  Occasional wrong word or "sound a like" substitutions may have occurred due to the inherent limitations of voice recognition software  Read the chart carefully and recognize, using context, where substitutions have occurred

## 2018-07-13 NOTE — PHYSICAL THERAPY NOTE
Physical Therapy Progress Note   07/12/18 3970   Pain Assessment   Pain Assessment 0-10   Pain Score 3   Pain Type Surgical pain   Pain Location Hip   Pain Orientation Right   Pain Frequency Constant/continuous   Pain Onset Ongoing   Clinical Progression Gradually improving   Effect of Pain on Daily Activities Did not increase with activity   Hospital Pain Intervention(s) Repositioned; Ambulation/increased activity; Emotional support;Rest;Cold applied   Response to Interventions Tolerated   Precautions   Total Hip Replacement ADduction; Internal rotation; Flexion   Restrictions/Precautions   Weight Bearing Precautions Per Order Yes   RLE Weight Bearing Per Order WBAT   Other Precautions WBS;THR;Fall Risk;Pain   General   Chart Reviewed Yes   Response to Previous Treatment Patient with no complaints from previous session  Family/Caregiver Present No   Cognition   Overall Cognitive Status WFL   Arousal/Participation Alert; Responsive; Cooperative   Attention Within functional limits   Orientation Level Oriented X4   Memory Within functional limits   Following Commands Follows all commands and directions without difficulty   Comments Pt able to recall 3/3 THP's   Subjective   Subjective Pt willing to participate in PT   Bed Mobility   Sit to Supine 4  Minimal assistance   Additional items Assist x 1;HOB elevated; Increased time required;Verbal cues;LE management;Leg    Additional Comments Continues to require LE management and assistance with leg    Transfers   Sit to Stand 4  Minimal assistance   Additional items Assist x 1; Armrests; Increased time required;Verbal cues   Stand to Sit 4  Minimal assistance   Additional items Assist x 1; Armrests; Increased time required;Verbal cues   Ambulation/Elevation   Gait pattern Decreased foot clearance; Short stride; Step to;Excessively slow;Decreased R stance  (Initially step-to, progresses to step-through with no cues)   Gait Assistance 4  Minimal assist   Additional items Assist x 1;Verbal cues   Assistive Device Bariatric Rolling walker   Distance 83' x2   Balance   Static Sitting Good   Dynamic Sitting Fair +   Static Standing Fair   Dynamic Standing Fair -   Ambulatory Poor +   Higher level balance Side stepping  (5 side steps performed  )   Higher level balance rep range (To L)   Endurance Deficit   Endurance Deficit Yes   Endurance Deficit Description Pain and fatigue   Activity Tolerance   Activity Tolerance Patient limited by fatigue;Patient limited by pain   Nurse [address], RN   Exercises   Glute Sets Sitting   Hip Flexion 15 reps;AROM; Bilateral   Knee AROM Long Arc Quad Sitting;15 reps;AROM; Bilateral   Ankle Pumps Sitting;15 reps;AROM; Bilateral   THR Supine;15 reps;AAROM;AROM; Right  (AAROM for hip abd/add and SLR)   Assessment   Prognosis Good   Problem List Decreased strength;Decreased range of motion;Decreased endurance; Impaired balance;Decreased mobility;Orthopedic restrictions;Pain   Assessment Pt continues to require Min A x1 for transfers and ambulation and c/o stiffness when taking first few steps  Pt is able to progress to step-through gait pattern without verbal cueing, but requires minimal cueing for proper pace when turning with RW  Pt was able to ambulate greater distances compared to last session to 80' x2 with RW  Pt continues to demonstrate improvement with ease and less time required to perform bed mobility from sit>supine with use of leg   Pt continues to require Min A x1 for LE management  Pt able to recall 3/3 THP's this session and continues to adhere to precautions during all aspects of mobility  Continues to perform 15 reps of supine THP HEP and continues to demonstrate difficulty in performing SLR's and hip abd/add  requiring assistance to perform  Pt was repositioned in supine with call bell in reach  Continue to recommend short term skilled PT at this time to maximize functional mobility and independence for safe d/c to home alone  Barriers to Discharge Inaccessible home environment;Decreased caregiver support   Barriers to Discharge Comments Lives alone, 1 FÉLIX, 1 FOS to 2nd level   Goals   Patient Goals To go to rehab   STG Expiration Date 07/20/18   Treatment Day 5   Plan   Treatment/Interventions Functional transfer training;LE strengthening/ROM; Elevations; Therapeutic exercise; Endurance training;Patient/family training;Equipment eval/education; Bed mobility;Gait training;Spoke to nursing;OT   Progress Progressing toward goals   PT Frequency 7x/wk; Twice a day   Recommendation   Recommendation Short-term skilled PT   PT - OK to Discharge Yes   Additional Comments To rehab     Lauren Oden, PTA Student

## 2018-07-13 NOTE — PLAN OF CARE
Problem: PHYSICAL THERAPY ADULT  Goal: Performs mobility at highest level of function for planned discharge setting  See evaluation for individualized goals  Treatment/Interventions: Functional transfer training, LE strengthening/ROM, Elevations, Therapeutic exercise, Endurance training, Patient/family training, Equipment eval/education, Bed mobility, Gait training, Compensatory technique education, Spoke to nursing  Equipment Recommended: Other (Comment) (has RW at home )       See flowsheet documentation for full assessment, interventions and recommendations  Outcome: Progressing  Prognosis: Good  Problem List: Decreased strength, Decreased range of motion, Decreased endurance, Impaired balance, Decreased mobility, Orthopedic restrictions, Pain  Assessment: Pt found seated in bedside chair and willing to participate in PT  Pt continues to require Min A x1 for ambulation  Pt was unable as far this session fatigue, pt was able to  distances of 107' x2 with RW  Improvement with fluency of gait noted this session with step-through gait pattern and pt states she feels less stiff when taking first steps  Pt continues to require Min A x1 for LE management while performing supine<>sit  Pt continues to perform 20 reps of supine HEP and requires AAROM for SLR's and hip abd/add  Pt required verbal cueing for proper exercise techniques, as well as safety with performing transfers with RW  Pt was returned to bedside chair with call bell in reach  Offered ice pack and donning of SCD's, however pt declined  Continue to recommend short term skilled pt at this time to maximize independence for safe d/c to home alone  Barriers to Discharge: Inaccessible home environment  Barriers to Discharge Comments: lives alone, 1 FÉLIX, 1 FOS to 2nd level  Recommendation: Short-term skilled PT     PT - OK to Discharge: Yes    See flowsheet documentation for full assessment

## 2018-07-13 NOTE — PLAN OF CARE
Problem: PHYSICAL THERAPY ADULT  Goal: Performs mobility at highest level of function for planned discharge setting  See evaluation for individualized goals  Treatment/Interventions: Functional transfer training, LE strengthening/ROM, Elevations, Therapeutic exercise, Endurance training, Patient/family training, Equipment eval/education, Bed mobility, Gait training, Compensatory technique education, Spoke to nursing  Equipment Recommended: Other (Comment) (has RW at home )       See flowsheet documentation for full assessment, interventions and recommendations  Outcome: Progressing  Prognosis: Good  Problem List: Decreased strength, Decreased range of motion, Decreased endurance, Impaired balance, Decreased mobility, Orthopedic restrictions, Pain  Assessment: Pt continues to require Min A x1 for transfers and ambulation and c/o stiffness when taking first few steps  Pt is able to progress to step-through gait pattern without verbal cueing, but requires minimal cueing for proper pace when turning with RW  Pt was able to ambulate greater distances compared to last session to 80' x2 with RW  Pt continues to demonstrate improvement with ease and less time required to perform bed mobility from sit>supine with use of leg   Pt continues to require Min A x1 for LE management  Pt able to recall 3/3 THP's this session and continues to adhere to precautions during all aspects of mobility  Continues to perform 15 reps of supine THP HEP and continues to demonstrate difficulty in performing SLR's and hip abd/add  requiring assistance to perform  Pt was repositioned in supine with call bell in reach  Continue to recommend short term skilled PT at this time to maximize functional mobility and independence for safe d/c to home alone    Barriers to Discharge: Inaccessible home environment, Decreased caregiver support  Barriers to Discharge Comments: Lives alone, 1 FÉLIX, 1 FOS to 2nd level  Recommendation: Short-term skilled PT     PT - OK to Discharge: Yes    See flowsheet documentation for full assessment

## 2018-07-13 NOTE — SOCIAL WORK
MAYO obtained authorization from [facility] for patient to go to [facility] today  Patient approved for a SNF level 1 for an initial 7 days, auth # [id number]; last covered day is 7/19/18 with an update due on 7/19/18 to Sarbjit Letters at [phone]  CM provided [facility] with authorization information  Patient is arranged for a [facility] transport with Sherri HUBER made RN; patient and UC aware of same  No other needs at present  CM to follow as needed

## 2018-07-13 NOTE — SOCIAL WORK
Patient accepted at [facility] HANY HUBER submitted for insurance authorization and is awaiting insurance determination

## 2018-07-13 NOTE — SOCIAL WORK
CM received VM from Norma Walker at [facility]  that insurance denied patient for acute rehab  CM informed patient of same; patient is agreeable to referral to [facility]; referral submitted  CM following

## 2018-07-13 NOTE — PHYSICAL THERAPY NOTE
Physical Therapy Progress Note   07/13/18 1143   Pain Assessment   Pain Assessment 0-10   Pain Score 4   Pain Type Surgical pain   Pain Location Hip   Pain Orientation Right   Pain Frequency Constant/continuous   Pain Onset Ongoing   Effect of Pain on Daily Activities Did not increase with activity   Hospital Pain Intervention(s) Ambulation/increased activity; Emotional support; Rest   Response to Interventions Tolerated   Precautions   Total Hip Replacement ADduction; Internal rotation; Flexion   Restrictions/Precautions   Weight Bearing Precautions Per Order Yes   RLE Weight Bearing Per Order WBAT   Other Precautions WBS;THR;Fall Risk;Pain   General   Chart Reviewed Yes   Response to Previous Treatment Patient with no complaints from previous session  Family/Caregiver Present No   Cognition   Overall Cognitive Status WFL   Arousal/Participation Alert; Responsive; Cooperative   Attention Within functional limits   Orientation Level Oriented X4   Memory Within functional limits   Following Commands Follows all commands and directions without difficulty   Subjective   Subjective "I feel like it was easier to get in and out of bed over night when I had to use the bathroom "   Bed Mobility   Supine to Sit 4  Minimal assistance   Additional items Assist x 1; Increased time required;Verbal cues;LE management   Sit to Supine 4  Minimal assistance   Additional items Assist x 1;HOB elevated; Increased time required;Verbal cues;LE management   Additional Comments Denies use for leg  with use of sheet  Improvement noted with ease of bed mobility   Transfers   Sit to Stand 4  Minimal assistance   Additional items Assist x 1; Armrests; Increased time required;Verbal cues   Stand to Sit 4  Minimal assistance   Additional items Assist x 1; Increased time required;Verbal cues   Toilet transfer 6  Modified independent   Additional items Assist x 1; Armrests; Increased time required;Commode   Ambulation/Elevation   Gait pattern Decreased R stance; Short stride; Step to;Excessively slow  (progresses to step-through pattern without cueing)   Gait Assistance 4  Minimal assist   Additional items Assist x 1;Verbal cues   Assistive Device Bariatric Rolling walker   Distance 15' x2, 105 x1, 115' x1   Stair Management Assistance Not tested   Balance   Static Sitting Good   Dynamic Sitting Fair +   Static Standing Fair   Dynamic Standing Fair -   Ambulatory Poor +   Endurance Deficit   Endurance Deficit Yes   Endurance Deficit Description fatigue and pain   Activity Tolerance   Activity Tolerance Patient limited by fatigue;Patient limited by pain   Nurse [address] [place] [address],    Exercises   Quad Sets Supine;20 reps;AROM; Bilateral   Heelslides Supine;20 reps;AROM; Right   Glute Sets Supine;20 reps;AROM; Bilateral   Hip Flexion Supine;20 reps;AAROM; Right  (straight leg raise)   Hip Abduction Supine;20 reps;AAROM; Right   Hip Adduction Supine;20 reps;AAROM; Right  (to midline)   Knee AROM Short Arc Quad Supine;20 reps;AROM; Right   Knee AROM Long Arc Quad Sitting;20 reps;AROM; Right   Ankle Pumps Supine;20 reps;AROM; Right   Assessment   Prognosis Good   Problem List Decreased strength;Decreased range of motion;Decreased endurance; Impaired balance;Decreased mobility;Orthopedic restrictions;Pain   Assessment Pt continues to require Min A x1 for transfers with minimal verbal cueing for handplcement, however pt is able to perform toilet transfer with Mod I from commode seat/ higher surfaces  Pt was able to ambulate greater distances compared to last session to 15' x2, 105' x1, and 115' x1 with RW and Min A x1  Pt is able to progress to step-through gait pattern with improved fluency of gait noted  Pt adheres to hip precautions with all aspects of mobility  Greater ease with bed mobility noted this session, however pt continues to require Min A x1 for LE management  When transferring in and out of bed   Pt was also able to progress with supine HEP to 20 reps and continues to require assistance with hip abduction/adduction, and SLR's  Pt was repositioned in bedside chair with call bell in reach and denied use of ice to R hip  Continue to recommend short term skilled  PT to maximize independence for safe d/c to home alone  Barriers to Discharge Inaccessible home environment   Barriers to Discharge Comments lives alone, 1 FÉLIX, 1 FOS to 2nd level   Goals   Patient Goals To go to rehab   STG Expiration Date 07/20/18   Treatment Day 6   Plan   Treatment/Interventions Functional transfer training;LE strengthening/ROM; Elevations; Therapeutic exercise; Endurance training;Patient/family training;Equipment eval/education; Bed mobility;Gait training;Spoke to nursing   Progress Progressing toward goals   PT Frequency 7x/wk; Twice a day   Recommendation   Recommendation Short-term skilled PT   PT - OK to Discharge Yes   Additional Comments To rehab     Gale Dutta, PTA Student

## 2018-07-13 NOTE — PLAN OF CARE
Problem: PHYSICAL THERAPY ADULT  Goal: Performs mobility at highest level of function for planned discharge setting  See evaluation for individualized goals  Treatment/Interventions: Functional transfer training, LE strengthening/ROM, Elevations, Therapeutic exercise, Endurance training, Patient/family training, Equipment eval/education, Bed mobility, Gait training, Compensatory technique education, Spoke to nursing  Equipment Recommended: Other (Comment) (has RW at home )       See flowsheet documentation for full assessment, interventions and recommendations  Outcome: Progressing  Prognosis: Good  Problem List: Decreased strength, Decreased range of motion, Decreased endurance, Impaired balance, Decreased mobility, Orthopedic restrictions, Pain  Assessment: Pt found seated in bedside chair and willing to participate in PT  Pt continues to require Min A x1 for ambulation distances of 107' x2 with RW  Improvement with fluency of gait noted this session with step-through gait pattern and pt states she feels less stiff when taking first steps  Pt continues to require Min A x1 for LE management while performing supine<>sit  Pt continues to perform 20 reps of supine HEP and requires AAROM for SLR's and hip abd/add  Pt required verbal cueing for proper exercise technique, as well as safety with performing transfers with RW  Pt was returned to bedside chair with call bell in reach  Offered ice pack and donning of SCD's, however pt declined  Continue to recommend short term skilled pt at this time to maximize independence for safe d/c to home  Barriers to Discharge: Inaccessible home environment  Barriers to Discharge Comments: lives alone, 1 FÉLIX, 1 FOS to 2nd level  Recommendation: Short-term skilled PT     PT - OK to Discharge: Yes    See flowsheet documentation for full assessment

## 2018-07-13 NOTE — PHYSICAL THERAPY NOTE
Physical Therapy Progress Note     07/13/18 1541   Pain Assessment   Pain Assessment 0-10   Pain Score 4   Pain Type Surgical pain   Pain Location Hip   Pain Orientation Right   Pain Frequency Constant/continuous   Pain Onset Ongoing   Clinical Progression Not changed   Effect of Pain on Daily Activities Did not increase with activity   Hospital Pain Intervention(s) Repositioned; Ambulation/increased activity; Emotional support; Rest   Response to Interventions Tolerated   Precautions   Total Hip Replacement ADduction; Internal rotation; Flexion   Restrictions/Precautions   Weight Bearing Precautions Per Order Yes   RLE Weight Bearing Per Order WBAT   Other Precautions WBS;THR;Fall Risk;Pain   General   Chart Reviewed Yes   Response to Previous Treatment Patient with no complaints from previous session  Family/Caregiver Present No   Cognition   Overall Cognitive Status WFL   Arousal/Participation Alert; Responsive; Cooperative   Attention Within functional limits   Orientation Level Oriented X4   Memory Within functional limits   Following Commands Follows multistep commands without difficulty   Subjective   Subjective Pt willing to participate in PT  Bed Mobility   Supine to Sit 4  Minimal assistance   Additional items Assist x 1; Increased time required;Verbal cues;LE management   Sit to Supine 4  Minimal assistance   Additional items Assist x 1; Increased time required;Verbal cues;LE management   Additional Comments Requires assist of 1 for  R LE management   Transfers   Sit to Stand 4  Minimal assistance   Additional items Assist x 1; Armrests; Increased time required;Verbal cues   Stand to Sit 4  Minimal assistance   Additional items Assist x 1; Armrests; Increased time required;Verbal cues   Ambulation/Elevation   Gait pattern Decreased R stance; Short stride; Excessively slow  (step-through gait pattern)   Gait Assistance 4  Minimal assist   Additional items Assist x 1   Assistive Device Bariatric Rolling walker Distance 107' x2   Stair Management Assistance Not tested   Balance   Static Sitting Good   Dynamic Sitting Fair +   Static Standing Fair   Dynamic Standing Fair -   Ambulatory Poor +   Endurance Deficit   Endurance Deficit Yes   Endurance Deficit Description fatigue and pain   Activity Tolerance   Activity Tolerance Patient limited by fatigue;Patient limited by pain   Exercises   THR Supine;20 reps;AAROM;AROM; Right  (AAROM for hip abd/add, SLR  arom LAQ's in sitting)   Assessment   Prognosis Good   Problem List Decreased strength;Decreased range of motion;Decreased endurance; Impaired balance;Decreased mobility;Orthopedic restrictions;Pain   Assessment Pt found seated in bedside chair and willing to participate in PT  Pt continues to require Min A x1 for ambulation  Pt was unable as far this session fatigue, pt was able to  distances of 107' x2 with RW  Improvement with fluency of gait noted this session with step-through gait pattern and pt states she feels less stiff when taking first steps  Pt continues to require Min A x1 for LE management while performing supine<>sit  Pt continues to perform 20 reps of supine HEP and requires AAROM for SLR's and hip abd/add  Pt required verbal cueing for proper exercise techniques, as well as safety with performing transfers with RW  Pt was returned to bedside chair with call bell in reach  Offered ice pack and donning of SCD's, however pt declined  Continue to recommend short term skilled pt at this time to maximize independence for safe d/c to home alone  Barriers to Discharge Inaccessible home environment   Barriers to Discharge Comments lives alone, 1 FÉLIX, 1 FOS to 2nd level   Goals   Patient Goals to go to rehab   STG Expiration Date 07/20/18   Treatment Day 7   Plan   Treatment/Interventions Functional transfer training;LE strengthening/ROM; Elevations; Therapeutic exercise; Endurance training;Patient/family training;Equipment eval/education; Bed mobility;Gait training Progress Progressing toward goals   PT Frequency 7x/wk; Twice a day   Recommendation   Recommendation Short-term skilled PT   PT - OK to Discharge Yes   Additional Comments To rehab     Marek Adjutant, BRIDGER Student    Actual treatment time 15:41-16:11

## 2018-07-14 LAB
ANION GAP SERPL CALCULATED.3IONS-SCNC: 8 MMOL/L (ref 5–14)
BASOPHILS # BLD AUTO: 0 THOUSANDS/ΜL (ref 0–0.1)
BASOPHILS NFR BLD AUTO: 0 % (ref 0–1)
BUN SERPL-MCNC: 13 MG/DL (ref 5–25)
CALCIUM SERPL-MCNC: 8.4 MG/DL (ref 8.4–10.2)
CHLORIDE SERPL-SCNC: 98 MMOL/L (ref 97–108)
CO2 SERPL-SCNC: 30 MMOL/L (ref 22–30)
CREAT SERPL-MCNC: 0.47 MG/DL (ref 0.6–1.2)
EOSINOPHIL # BLD AUTO: 0.1 THOUSAND/ΜL (ref 0–0.4)
EOSINOPHIL NFR BLD AUTO: 2 % (ref 0–6)
ERYTHROCYTE [DISTWIDTH] IN BLOOD BY AUTOMATED COUNT: 14.7 %
GFR SERPL CREATININE-BSD FRML MDRD: 106 ML/MIN/1.73SQ M
GLUCOSE SERPL-MCNC: 92 MG/DL (ref 70–99)
HCT VFR BLD AUTO: 30.5 % (ref 36–46)
HGB BLD-MCNC: 10.3 G/DL (ref 12–16)
HYPERCHROMIA BLD QL SMEAR: PRESENT
LYMPHOCYTES # BLD AUTO: 1.9 THOUSANDS/ΜL (ref 0.5–4)
LYMPHOCYTES NFR BLD AUTO: 26 % (ref 20–50)
MCH RBC QN AUTO: 28.9 PG (ref 26–34)
MCHC RBC AUTO-ENTMCNC: 33.9 G/DL (ref 31–36)
MCV RBC AUTO: 85 FL (ref 80–100)
MONOCYTES # BLD AUTO: 0.7 THOUSAND/ΜL (ref 0.2–0.9)
MONOCYTES NFR BLD AUTO: 10 % (ref 1–10)
NEUTROPHILS # BLD AUTO: 4.4 THOUSANDS/ΜL (ref 1.8–7.8)
NEUTS SEG NFR BLD AUTO: 62 % (ref 45–65)
PLATELET # BLD AUTO: 133 THOUSANDS/UL (ref 150–450)
PLATELET BLD QL SMEAR: ABNORMAL
PMV BLD AUTO: 10.3 FL (ref 8.9–12.7)
POTASSIUM SERPL-SCNC: 4 MMOL/L (ref 3.6–5)
RBC # BLD AUTO: 3.57 MILLION/UL (ref 4–5.2)
RBC MORPH BLD: ABNORMAL
SODIUM SERPL-SCNC: 136 MMOL/L (ref 137–147)
WBC # BLD AUTO: 7.2 THOUSAND/UL (ref 4.5–11)

## 2018-07-14 PROCEDURE — G8978 MOBILITY CURRENT STATUS: HCPCS

## 2018-07-14 PROCEDURE — 97163 PT EVAL HIGH COMPLEX 45 MIN: CPT

## 2018-07-14 PROCEDURE — G8979 MOBILITY GOAL STATUS: HCPCS

## 2018-07-14 PROCEDURE — 97530 THERAPEUTIC ACTIVITIES: CPT

## 2018-07-14 PROCEDURE — 80048 BASIC METABOLIC PNL TOTAL CA: CPT | Performed by: HOSPITALIST

## 2018-07-14 PROCEDURE — 85025 COMPLETE CBC W/AUTO DIFF WBC: CPT | Performed by: HOSPITALIST

## 2018-07-14 RX ADMIN — TRAMADOL HYDROCHLORIDE 50 MG: 50 TABLET, COATED ORAL at 20:03

## 2018-07-14 RX ADMIN — SENNOSIDES 8.6 MG: 8.6 TABLET, FILM COATED ORAL at 08:46

## 2018-07-14 RX ADMIN — VENLAFAXINE HYDROCHLORIDE 150 MG: 150 CAPSULE, EXTENDED RELEASE ORAL at 08:46

## 2018-07-14 RX ADMIN — ASPIRIN 81 MG 81 MG: 81 TABLET ORAL at 08:46

## 2018-07-14 RX ADMIN — ASPIRIN 81 MG 81 MG: 81 TABLET ORAL at 17:34

## 2018-07-14 RX ADMIN — CELECOXIB 200 MG: 200 CAPSULE ORAL at 08:49

## 2018-07-14 RX ADMIN — MAGNESIUM OXIDE TAB 400 MG (241.3 MG ELEMENTAL MG) 200 MG: 400 (241.3 MG) TAB at 12:39

## 2018-07-14 RX ADMIN — VITAMIN D, TAB 1000IU (100/BT) 4000 UNITS: 25 TAB at 08:45

## 2018-07-14 RX ADMIN — VALSARTAN 80 MG: 40 TABLET, FILM COATED ORAL at 08:45

## 2018-07-14 RX ADMIN — DOCUSATE SODIUM 100 MG: 100 CAPSULE, LIQUID FILLED ORAL at 17:35

## 2018-07-14 RX ADMIN — B-COMPLEX W/ C & FOLIC ACID TAB 1 TABLET: TAB at 08:45

## 2018-07-14 RX ADMIN — Medication 1 TABLET: at 08:46

## 2018-07-14 RX ADMIN — DOCUSATE SODIUM 100 MG: 100 CAPSULE, LIQUID FILLED ORAL at 08:57

## 2018-07-14 RX ADMIN — ENOXAPARIN SODIUM 40 MG: 40 INJECTION SUBCUTANEOUS at 08:44

## 2018-07-14 NOTE — ASSESSMENT & PLAN NOTE
Patient allergic to ACE inhibitor will continue ARB with holding parameters(nurse instructed to hold BP Rx if systolic blood pressure <891)  Monitor blood pressure

## 2018-07-14 NOTE — PLAN OF CARE
Problem: PHYSICAL THERAPY ADULT  Goal: Performs mobility at highest level of function for planned discharge setting  See evaluation for individualized goals  Treatment/Interventions: ADL retraining, Functional transfer training, Elevations, Therapeutic exercise, Endurance training, Patient/family training, Equipment eval/education, Bed mobility, Gait training, Spoke to nursing          See flowsheet documentation for full assessment, interventions and recommendations  Outcome: Progressing  Prognosis: Fair  Problem List: Decreased strength, Decreased range of motion, Decreased endurance, Impaired balance, Decreased mobility, Decreased safety awareness, Pain, Orthopedic restrictions  Assessment: In summary, guiding factors including patient history, examination of body system(s), clinical presentation and clinical decision making were considered  Patient presents with comorbid conditions that impact function, context of current functional limitations as compared to the prior level of function, lacking physical support, recent hospital admission and with living environment deficits  Patient also presents with c/o right hip pain, impaired safety awareness, right hip strength/ROM, bed mobility, transfers, endurance for activity, standing balance and gait abilities  Clinical presentation is unstable at this time  The assigned level of complexity is: high  Patient would benefit from continued PT treatment to address deficits as defined above and restore or maximize level of functional mobility with consistency in order to return to home with FÉLIX, return to multi-level home and increase independence in home alone environment while maintaining right hip precautions  Barriers to Discharge: Other (Comment) (Lives alone in 2nd level bed/bathrooms with steps to enter )                See flowsheet documentation for full assessment

## 2018-07-14 NOTE — H&P
H&P- Lakeisha Rubi 1955, 59 y o  female MRN: [id number]    Unit/Bed#: -01 Encounter: [id number]    Primary Care Provider: Darcy Epperson DO   Date and time admitted to hospital: 7/13/2018  8:16 PM        * Hx of total hip arthroplasty, right   Assessment & Plan    Patient was transferred from [facility] after she underwent a right total hip arthroplasty by Patsy Casillas on Jul 10, 4445  she will continue with rehab program  Pain management  DVT prophylaxis        Essential hypertension   Assessment & Plan    Patient allergic to ACE inhibitor will continue ARB with holding parameters(nurse instructed to hold BP Rx if systolic blood pressure <408)  Monitor blood pressure        Irritable bowel syndrome   Assessment & Plan    Monitor for diarrhea or constipation        Chronic back pain   Assessment & Plan    Continue current pain management protocol        Leukocytosis   Assessment & Plan    Most likely postoperative, will repeat CBC in a m  If persistent leukocytosis consider imaging study to rule out postop atelectasis        Class 1 obesity in adult   Assessment & Plan    Encourage exercises and healthy diet            VTE Prophylaxis: Enoxaparin (Lovenox)  / sequential compression device   Code Status: full  POLST: There is no POLST form on file for this patient (pre-hospital)      Anticipated Length of Stay:  Patient will be admitted on an Inpatient basis with an anticipated length of stay of  > 2 midnights  Justification for Hospital Stay:  Rehab course    Total Time for Visit, including Counseling / Coordination of Care: 45 minutes  Greater than 50% of this total time spent on direct patient counseling and coordination of care      Chief Complaint:  Mild right hip pain    History of Present Illness:    Lakeisha Rubi is a 59 y o   very pleasant female with past medical history of left SARAH few years ago, hypertension, IBS  who  presented to [facility] rehab after she  underwent a right SARAH by Dr Duarte on   She has uncomplicated postop  Potassium was corrected, remained stable  Mild leukocytosis postoperatively noted  Upon my assessment patient denies any plain on no fever chills cough sputum production headache chest pain abdominal pain diarrhea nausea vomiting  Patient will continue rehab, PT  Ordered  She remains hemodynamically stable and afebrile      Review of Systems:    Review of Systems   Musculoskeletal:        Mild right hip postop pain   All other systems reviewed and are negative  Past Medical and Surgical History:     Past Medical History:   Diagnosis Date    ACE-inhibitor cough     Last assessed 12/04/15    Anxiety     Arthritis     Chronic pain disorder     Depression     History of transfusion     no adverse reaction    Hypertension     Wears glasses        Past Surgical History:   Procedure Laterality Date     SECTION      CHOLECYSTECTOMY      COLONOSCOPY      JOINT REPLACEMENT Left     hip    [place] TOTAL HIP ARTHROPLASTY Right 7/10/2018    Procedure: ARTHROPLASTY HIP TOTAL;  Surgeon: Maria L Michaels MD;  Location: [facility];  Service: Orthopedics    TONSILLECTOMY AND ADENOIDECTOMY      TUBAL LIGATION      VAGINAL HYSTERECTOMY Bilateral 2007    nd ooperectomy       Meds/Allergies:    Prior to Admission medications    Medication Sig Start Date End Date Taking?  Authorizing Provider   acetaminophen (TYLENOL) 325 mg tablet Take 2 tablets (650 mg total) by mouth every 4 (four) hours as needed for headaches or fever 18  Yes Hieu Kraft PA-C   Ascorbic Acid (VITAMIN C-ANNA HIPS ER) 1000 MG TBCR Take 1 tablet by mouth daily 16  Yes Historical Provider, MD   aspirin 81 mg chewable tablet Chew 1 tablet (81 mg total) 2 (two) times a day 18  Yes Hieu Kraft PA-C   b complex vitamins capsule Take 1 capsule by mouth daily   Yes Historical Provider, MD   celecoxib (CeleBREX) 200 mg capsule Take 1 capsule (200 mg total) by mouth daily 7/13/18  Yes Rosita Lindsey PA-C   Cholecalciferol (VITAMIN D PO) Take 4,000 Units by mouth daily   Yes Historical Provider, MD   losartan (COZAAR) 50 mg tablet Take 1 tablet (50 mg total) by mouth daily 3/6/18  Yes Joan Lieberman DO   Magnesium 200 MG TABS Take 200 mg by mouth daily   Yes Historical Provider, MD   Multiple Vitamin (MULTIVITAMINS PO) Take by mouth   Yes Historical Provider, MD   oxyCODONE (ROXICODONE) 5 mg immediate release tablet Take 1-2 tablets (5-10 mg total) by mouth every 4 (four) hours as needed for severe pain Max Daily Amount: 60 mg 7/10/18  Yes Rosita Lindsey PA-C   traMADol (ULTRAM) 50 mg tablet Take 1 tablet (50 mg total) by mouth every 6 (six) hours as needed for moderate pain 7/10/18  Yes Rosita Lindsey PA-C   venlafaxine (EFFEXOR-XR) 150 mg 24 hr capsule TAKE ONE CAPSULE BY MOUTH EVERY DAY 3/26/18  Yes Joan Lieberman DO   ibuprofen (ADVIL) 200 mg tablet Take 200 mg by mouth every 6 (six) hours as needed for mild pain  7/13/18  Historical Provider, MD     I have reviewed home medications with a medical source (PCP, Pharmacy, other)  Allergies: Allergies   Allergen Reactions    Lisinopril Cough    Ace Inhibitors Cough     Category:  Adverse Reaction;        Social History:     Marital Status:    Occupation:  [place]  Patient Pre-hospital Living Situation: home  Patient Pre-hospital Level of Mobility: reg  Patient Pre-hospital Diet Restrictions: reg  Substance Use History:   History   Alcohol Use    1 8 oz/week    3 Glasses of wine per week     Comment: on the weekend; not much     History   Smoking Status    Former Smoker    Quit date: 1990   Smokeless Tobacco    Never Used     History   Drug Use No       Family History:    non-contributory    Physical Exam:     Vitals:   Blood Pressure: 99/55 (07/13/18 2053)  Pulse: 77 (07/13/18 2053)  Temperature: 97 7 °F (36 5 °C) (07/13/18 2053)  Respirations: 18 (07/13/18 2053)  Height: 5' 2" (157 5 cm) (5 f 2 i) (07/13/18 2053)  Weight - Scale: 92 4 kg (203 lb 11 3 oz) (07/13/18 2053)    Physical Exam   Constitutional: She appears well-developed  HENT:   Head: Normocephalic  Eyes: Pupils are equal, round, and reactive to light  Neck: Normal range of motion  Cardiovascular: Regular rhythm  Pulmonary/Chest: No respiratory distress  Abdominal: She exhibits no distension  There is no tenderness  Musculoskeletal: She exhibits tenderness  She exhibits no edema  Postop site tenderness   Neurological: She is alert  Skin: Skin is warm  Psychiatric: She has a normal mood and affect  Additional Data:     Lab Results: I have personally reviewed pertinent reports  Results from last 7 days  Lab Units 07/11/18  0441   WBC Thousand/uL 13 18*   HEMOGLOBIN g/dL 11 6   HEMATOCRIT % 36 0   PLATELETS Thousands/uL 126*       Results from last 7 days  Lab Units 07/13/18  0439   SODIUM mmol/L 138   POTASSIUM mmol/L 4 1   CHLORIDE mmol/L 103   CO2 mmol/L 30   BUN mg/dL 9   CREATININE mg/dL 0 61   CALCIUM mg/dL 8 8   GLUCOSE RANDOM mg/dL 113                   Imaging: I have personally reviewed pertinent reports  No orders to display       [employer] / [facility] Records Reviewed: Yes     ** Please Note: This note has been constructed using a voice recognition system   **

## 2018-07-14 NOTE — ASSESSMENT & PLAN NOTE
Most likely postoperative, will repeat CBC in a m    If persistent leukocytosis consider imaging study to rule out postop atelectasis  Patient was encouraged to continue using of  incentive spirometer

## 2018-07-14 NOTE — PHYSICAL THERAPY NOTE
PHYSICAL THERAPY EVALUATION - [facility]    Time In: 14:00   Time Out: 14:20  Total Time: 20 min  MRN: [id number]    Patient is a 59 y o  female originally admitted to [address] [place] [address] on 7/10/18 for an elective right SARAH, posterior lateral approach  Patient transferred to [address] on 2018 with order in place for PT Evaluation and Treatment  Patient evaluated by PT today with admitting diagnosis of:  Hx of total hip arthroplasty, right [G57 [id number]]; PT treatment diagnosis: Difficulty in walking R26 2  Please refer to H & P for further details  Past Medical History:   Diagnosis Date    ACE-inhibitor cough     Last assessed 12/04/15    Anxiety     Arthritis     Chronic pain disorder     Depression     History of transfusion     no adverse reaction    Hypertension     Wears glasses        Past Surgical History:   Procedure Laterality Date     SECTION      CHOLECYSTECTOMY      COLONOSCOPY      JOINT REPLACEMENT Left     hip    [place] TOTAL HIP ARTHROPLASTY Right 7/10/2018    Procedure: ARTHROPLASTY HIP TOTAL;  Surgeon: Celia Castellano MD;  Location: [facility];  Service: Orthopedics    TONSILLECTOMY AND ADENOIDECTOMY      TUBAL LIGATION      VAGINAL HYSTERECTOMY Bilateral 2007    nd ooperectomy       Comorbidities affecting patient's physical performance at time of assessment include: HTN and anxiety  Personal factors affecting the patient at time of IE include: lives in [address] [place] house, anxiety, visual impairments and 1 step to enter home  Please find objective findings from PT assessment regarding body systems outlined below:     18 1440   Note Type   Note type Eval/Treat   Pain Assessment   Pain Assessment 0-10   Pain Score 2   Pain Type Surgical pain   Pain Location Hip   Pain Orientation Right   Patient's Stated Pain Goal No pain   Hospital Pain Intervention(s) Medication (See MAR); Repositioned  (Declines ice pack at this time)   Home Living   Type of Home House; Other (Comment)  (2 SH with 1 high FÉLIX without HR (post available on left))   Home Layout Two level;Bed/bath upstairs;Stairs to enter without rails; Other (Comment)  (FF of steps up to 2nd level with right HR)   Bathroom Shower/Tub Tub/shower unit   [employer] Raised   Bathroom Equipment Grab bars in shower;Commode; Other (Comment)  (Tub seat, grab bar next to toilet)   Bathroom Accessibility Accessible   Home Equipment Quad cane; Other (Comment)  (NBQC)   Prior Function   Level of [place] Independent with ADLs and functional mobility   Lives With Alone   ADL Assistance Independent   IADLs Independent   Falls in the last 6 months 0   Vocational Full time employment   Comments ; (+) drives   Restrictions/Precautions   Weight Bearing Precautions Per Order Yes   RLE Weight Bearing Per Order WBAT   Other Precautions THR; Fall Risk;Pain   General   Family/Caregiver Present No  (States her son who lives in [facility] is coming to visit her 7/20/18)   Cognition   Overall Cognitive Status WFL   Arousal/Participation Alert   Attention Within functional limits   Orientation Level Oriented X4   Following Commands Follows all commands and directions without difficulty   RLE Assessment   RLE Assessment X   RLE Overall AROM   R Hip Flexion AROM/strength NT   R Knee Extension WFL; strength 4+/5   R Ankle Dorsiflexion WFL; strength 5/5   LLE Assessment   LLE Assessment WFL   LLE Overall AROM   L Hip Flexion WFL; strength 4/5   L Knee Extension WFL; strength 5/5   L Ankle Dorsiflexion WFL; strength 5/5   Coordination   Movements are Fluid and Coordinated 0   Coordination and Movement Description Limited right hip coordination due to being post-op   Sensation WFL   Bed Mobility   Supine to Sit 5  Supervision   Additional items Verbal cues; Increased time required;Comment  (VCs to maintain right hip precautions)   Transfers   Sit to Stand 5  Supervision   Additional items Increased time required Stand to Sit 5  Supervision   Additional items Increased time required   Ambulation/Elevation   Gait pattern Short stride  (Decreased natacha)   Gait Assistance 5  Supervision   Additional items Other (Comment)  (For safety)   Assistive Device Rolling walker   Distance 60 ft, 35 ft   Stair Management Assistance 5  Supervision  (Close, for safety)   Additional items Verbal cues; Increased time required  (VCs for sequencing)   Stair Management Technique No rails; Other (Comment)  (With RW; 1 + 1 curb-like steps)   Balance   Static Sitting Good   Static Standing Fair   Dynamic Standing Fair -   Endurance Deficit   Endurance Deficit Yes   Endurance Deficit Description (Limited amb distance & tolerance for increased exercise reps)   Activity Tolerance   Activity Tolerance Patient limited by fatigue;Patient limited by pain   Assessment   Prognosis Fair   Problem List Decreased strength;Decreased range of motion;Decreased endurance; Impaired balance;Decreased mobility; Decreased safety awareness;Pain;Orthopedic restrictions   Assessment In summary, guiding factors including patient history, examination of body system(s), clinical presentation and clinical decision making were considered  Patient presents with comorbid conditions that impact function, context of current functional limitations as compared to the prior level of function, lacking physical support, recent hospital admission and with living environment deficits  Patient also presents with c/o right hip pain, impaired safety awareness, right hip strength/ROM, bed mobility, transfers, endurance for activity, standing balance and gait abilities  Clinical presentation is unstable at this time  The assigned level of complexity is: high   Patient would benefit from continued PT treatment to address deficits as defined above and restore or maximize level of functional mobility with consistency in order to return to home with FÉLIX, return to multi-level home and increase independence in home alone environment while maintaining right hip precautions  Barriers to Discharge Other (Comment)  (Lives alone in 2nd level bed/bathrooms with steps to enter )   Goals   Patient Goals "Become independent again"  Short Term Goal #1 STGs = LTGs   LTG Expiration Date 07/24/18   Long Term Goal #1 1  Patient will perform all bed mobility with modified independence maintaining right hip precautions in order to get in/out of bed  2  Patient will perform all functional transfers with modified independence in order to  improve ability to perform upright tasks at home and increase independence in home alone environment  3  Patient will ambulate with modified independence WBAT on right LE x at least 200 ft with least restrictive assistive device in order to safely access all necessary areas of home  4  Patient will ascend/descend 1 step without HR (post support on left available) with least restrictive assistive device and full flight of steps with right handrail with least restrictive assistive device WBAT on right LE with modified independence to enter/exit home and access 2nd floor (bedroom/bathroom level of home)  5  Patient will be independent with HEP  Treatment Day 1   Plan   Treatment/Interventions ADL retraining;Functional transfer training;Elevations; Therapeutic exercise; Endurance training;Patient/family training;Equipment eval/education; Bed mobility;Gait training;Spoke to nursing   PT Frequency Once a day; Other (Comment)  (6x/wk)   Barthel Index   Feeding 10   Bathing 0   Grooming Score 0   Dressing Score 5   Bladder Score 10   Bowels Score 10   Toilet Use Score 5   Transfers (Bed/Chair) Score 10   Mobility (Level Surface) Score 10   Stairs Score 5   Barthel Index Score 65       PHYSICAL THERAPY TREATMENT NOTE    Time In: 14:20    Time Out: 14:40  Total Time: 20 min  MRN: [id number]    S: "I'm hoping to be here no more than 5 days", patient stated  Reviewed POC      O: Therapeutic Activities:   Reviewed hip precautions; patient recalled 3/3  Supine with HOB elevated exercises - bilateral ankle pumps x 15, bilateral quad sets x 15, gluteal sets x 15, right heel slides x 10, right hip abduction AAROM x 10  Sit < > stand with supervision for safety; increased time/effort to complete  Ambulation with RW with supervision WBAT on right LE 60 ft, then x 200 ft; ambulates with decreased natacha  Vitals: Seated post-ambulation and left UE BP = 138/88, Heart Rate = 90 bpm, SpO2 = 98%  on RA     A: Increased ambulation distance  P: Continue PT to improve functional mobility to modified independent level to return home  Patient transferred to recliner chair at bedside with LEs elevated; patient positioned with all needs, including call bell, within reach      Minal Karri Snider, PT, DPT

## 2018-07-14 NOTE — ASSESSMENT & PLAN NOTE
Patient was transferred from [facility] after she underwent a right total hip arthroplasty by Josette Mosher on Jul 10, 9384  she will continue with rehab program  Pain management  DVT prophylaxis

## 2018-07-15 PROCEDURE — 97535 SELF CARE MNGMENT TRAINING: CPT

## 2018-07-15 PROCEDURE — 97166 OT EVAL MOD COMPLEX 45 MIN: CPT

## 2018-07-15 RX ADMIN — MAGNESIUM OXIDE TAB 400 MG (241.3 MG ELEMENTAL MG): 400 (241.3 MG) TAB at 08:42

## 2018-07-15 RX ADMIN — OXYCODONE HYDROCHLORIDE 10 MG: 5 TABLET ORAL at 00:14

## 2018-07-15 RX ADMIN — DOCUSATE SODIUM 100 MG: 100 CAPSULE, LIQUID FILLED ORAL at 08:45

## 2018-07-15 RX ADMIN — ENOXAPARIN SODIUM 40 MG: 40 INJECTION SUBCUTANEOUS at 08:42

## 2018-07-15 RX ADMIN — B-COMPLEX W/ C & FOLIC ACID TAB 1 TABLET: TAB at 08:44

## 2018-07-15 RX ADMIN — Medication 1 TABLET: at 08:43

## 2018-07-15 RX ADMIN — DOCUSATE SODIUM 100 MG: 100 CAPSULE, LIQUID FILLED ORAL at 17:31

## 2018-07-15 RX ADMIN — ASPIRIN 81 MG 81 MG: 81 TABLET ORAL at 17:31

## 2018-07-15 RX ADMIN — SENNOSIDES 8.6 MG: 8.6 TABLET, FILM COATED ORAL at 08:40

## 2018-07-15 RX ADMIN — OXYCODONE HYDROCHLORIDE 10 MG: 5 TABLET ORAL at 21:51

## 2018-07-15 RX ADMIN — VITAMIN D, TAB 1000IU (100/BT) 4000 UNITS: 25 TAB at 08:40

## 2018-07-15 RX ADMIN — ASPIRIN 81 MG 81 MG: 81 TABLET ORAL at 08:41

## 2018-07-15 RX ADMIN — VALSARTAN 80 MG: 40 TABLET, FILM COATED ORAL at 08:39

## 2018-07-15 RX ADMIN — CELECOXIB 200 MG: 200 CAPSULE ORAL at 08:46

## 2018-07-15 RX ADMIN — VENLAFAXINE HYDROCHLORIDE 150 MG: 150 CAPSULE, EXTENDED RELEASE ORAL at 08:39

## 2018-07-15 NOTE — OCCUPATIONAL THERAPY NOTE
Occupational Therapy Evaluation      Velasquez Rubi    7/15/2018    Patient Active Problem List   Diagnosis    Essential hypertension    Cervical radiculopathy    Irritable bowel syndrome    Nontoxic single thyroid nodule    Vitamin D deficiency    Vitamin B12 deficiency    Menopausal symptoms    Anxiety    Primary localized osteoarthritis of pelvic region and thigh    Hx of total hip arthroplasty, right    Chronic back pain    Leukocytosis    Class 1 obesity in adult       Past Medical History:   Diagnosis Date    ACE-inhibitor cough     Last assessed 12/04/15    Anxiety     Arthritis     Chronic pain disorder     Depression     History of transfusion     no adverse reaction    Hypertension     Wears glasses        Past Surgical History:   Procedure Laterality Date     SECTION      CHOLECYSTECTOMY      COLONOSCOPY      JOINT REPLACEMENT Left     hip    [place] TOTAL HIP ARTHROPLASTY Right 7/10/2018    Procedure: ARTHROPLASTY HIP TOTAL;  Surgeon: Claudetta Rhodes, MD;  Location: [facility];  Service: Orthopedics    TONSILLECTOMY AND ADENOIDECTOMY      TUBAL LIGATION      VAGINAL HYSTERECTOMY Bilateral 2007    nd ooperectomy      07/15/18 1010   Note Type   Note type Eval/Treat   Restrictions/Precautions   Weight Bearing Precautions Per Order Yes   RLE Weight Bearing Per Order WBAT   Other Precautions THR; Fall Risk;Pain   Pain Assessment   Pain Assessment 0-10   Pain Score 4   Pain Type Surgical pain   Pain Location Hip   Pain Orientation Right   Home Living   Type of Home [facility]  ([facility] no rail)   Home Layout Two level;Bed/bath upstairs  (NO bathroom on first floor)   Bathroom Shower/Tub Tub/shower unit   Bathroom Toilet Raised   Bathroom Equipment Grab bars in shower;Commode;Tub transfer bench  (pt confirmed to have transfer bench)    [address] Quad cane; Reacher  (leg , just ordered long handled soap sponge)   Additional Comments Pt reports lives alone but has neighbors able to assist if needed   Prior Function   Level of Elsie Independent with ADLs and functional mobility   Lives With Alone   Receives Help From Family   ADL Assistance Independent   IADLs Independent   Falls in the last 6 months 0   Vocational Full time employment  ()   Comments (+) , works as a   Lifestyle   Reciprocal Relationships lives alone with local family and friends   Intrinsic Gratification reports no hobbies  "Used to bowl" but "due to joint problems" has since stopped   Psychosocial   Psychosocial (WDL) WDL   ADL   Eating Assistance 7  Independent   Grooming Assistance 5  Supervision/Setup   UB Bathing Assistance 6  Modified Independent   LB Bathing Assistance 3  Moderate Assistance   UB Dressing Assistance 6  Modified independent   LB Dressing Assistance 3  Moderate Assistance   Toileting Assistance  5  Supervision/Setup   Bed Mobility   Supine to Sit 4  Minimal assistance   Additional items Verbal cues;LE management   Sit to Supine 4  Minimal assistance   Additional items Verbal cues;LE management   Transfers   Sit to Stand 6  Modified independent   Additional items Increased time required   Stand to Sit 6  Modified independent   Additional items Increased time required   Toilet transfer 6  Modified independent   Additional Comments good hand placement with transfers   Functional Mobility   Functional Mobility 5  Supervision   Additional Comments pt reports has been taking self to/from restroom without assistance since admission to [facility]   Additional items Rolling walker   Balance   Static Sitting Good   Dynamic Sitting Good   Static Standing Fair +   Dynamic Standing Fair   Activity Tolerance   Activity Tolerance Patient limited by pain   Nurse Made Aware RN cleared pt for OT evaluation  Pt presents seated in chair, agreeable to evaluation   At the end of session, pt returned to chair with needs/call bell in reach   RUE Assessment RUE Assessment WFL   LUE Assessment   LUE Assessment WFL   Hand Function   Gross Motor Coordination Functional   Fine Motor Coordination Functional   Sensation   Light Touch No apparent deficits   Vision-Basic Assessment   Current Vision Wears glasses all the time   Vision - Complex Assessment   Ocular Range of Motion WFL   Acuity Able to read employee name badge without difficulty   Cognition   Overall Cognitive Status [facility]   Arousal/Participation Alert; Cooperative   Attention Within functional limits   Orientation Level Oriented X4   Memory Within functional limits   Following Commands Follows all commands and directions without difficulty   Comments Pt states 3/3 hip precautions with no cues but with inc'd time needed to recall   Assessment   Limitation Decreased ADL status; Decreased endurance;Decreased high-level ADLs; Decreased self-care trans   Prognosis Good   Assessment Pt is a 62yo/F adm to [address] [place] [address] for rehab following admission to [facility] for elective THR Posterolateral approach on 7/10/18  Pt WBAT on RLE and with hip precautions  Pt's chart reviewed prior to initiating evaluation; please refer above for details of PMH  PTA, pt living alone in a [facility] with NO restroom on first floor  Pt with family and friends in the area, however pt reports "everyone has their own lives" and therefore limited help available upon d/c  PTA, pt completely (I) with all self cares and functional mobility, works full time and drives  Currently, pt is at a mod (A) level for LB self cares, (S)/mod (I) for functional mobility and transfers  Pt below baseline level of independence 2' the following deficits impacting occupational performance: weakness, decreased strength, decreased balance, decreased tolerance and increased pain   Pt would benefit from skilled OT services to facilitate pt being able to safety engage in areas of occupational including sinkside grooming & hygiene, bathing, dressing, toileting, functional mobility/ADL transfers, laundry, house maintenance, cleaning, social participation activities, and meal prep  Goals   Patient Goals "To be independent again "    LTG Time Frame 7-10   Long Term Goal #1 (goals listed below and to be met within 1 week)   Plan   Treatment Interventions ADL retraining;Functional transfer training; Endurance training;Patient/family training;Equipment evaluation/education; Compensatory technique education;Continued evaluation; Energy conservation; Activityengagement   Goal Expiration Date 07/25/18   Treatment Day 1   OT Frequency (6x for 1 week)   Additional Treatment Session   Start Time 1030   End Time 1100   Treatment Assessment Pt seen for addition OT tx session following evaluation for education re: AE for LB self cares as well as ADL transfer training  Pt demonstrated functional mobility within room using RW with inc'd time needed to transfers from chair to/from restroom  Pt used restroom mod (I) with inc'd time  Handwashing sinkside mod (I) as well  Min vc's for safety with restroom door management  Upon returning to chair, OT had pt trial AE for LB dressing  Pt doffed socks using reacher, however pt have inc'd performance with dressing stick- IF pt feels necessary (pt noted does not wear socks in summer)  Pt with sandals with backing and attempted to don using long handled shoe horn  Pt unsuccessul with multiple attempts, however backing of shoe very flexible and no tongue of shoe making area very small for pt's foot to go in to shoe  Recommended to pt either a sneaker to adequately use shoe horn or slip on shoes to trial WITH PT to assess safety 2' slip on shoes generally not recommended  Pt admits likely to wear a slip on shoe or no shoes upon d/c  Noted pt without KRYSTLE stockings on and pt notes has not worn since d/c from hospital  OT inquired and RN reports pt SHOULD be wearing   OT assisted pt in donning on this date as pt unable and OT educated (verbally) on use of sock aide for donning SCD's (I)ly  Pt verbalized understanding  OT also educated on OT POC, activities that pt may be doing during [facility] staff and purpose of activities (ie: kitchen mobility, light meal prep, light cleaning/bed making)  Pt states, "I never understood why I did those things after my last hip" and pt appreciative of OT's education re: purpose  Pt made aware to alert OT staff if unsure of puropsef of any given activity  At the end of session, pt left with all needs/call bell in reach     Recommendation   OT Discharge Recommendation (continued OT services)     GOALS:      STG=LTG  Pt will achieve the following goals within 1 weeks    *G&H standing sinkside with (I)} and with Good balance for inc'd independence with self cares    *ADL transfers with mod (I) for inc'd independence with ADLs/purposeful tasks    *LB ADL at mod (I) level using AE prn for inc'd independence with self cares    *Toileting with mod (I) for clothing management and hygiene for return to PLOF with personal care    *Increase static stand balance to Good and dyn stand balance to Fair+/Good- for inc'd safety with standing purposeful tasks    *Increase stand tolerance x7-10m m for inc'd tolerance with standing purposeful tasks    *Activity tolerance- Good- for inc'd tolerance with purposeful tasks    *Participate in 10m UE therex to increase overall stamina/activity tolerance for purposeful tasks    *Tub/shower transfer using Tub seat/transfer bench with mod (I) for inc'd safety and independence with bathing    *Kitchen mobility- mod (I) for inc'd independence and safety negotiating kitchen environment    *Light meal prep- mod (I) to increase independence with preparing self nourishment    *Bed mobility- mod (I) for inc'd independence to manage own comfort and initiate EOB & OOB purposeful tasks    *Pt will carry out light homemaking tasks (laundry, bed making, etc) mod (I) with good safety    * Pt will safely carry out ADLs as well as ADL transfers without cues to maintain THR precautions 100% of the time    *Pt will recall 3/3 hip precautions for inc'd safety with all purposeful tasks      Clifford Lopez, OT

## 2018-07-15 NOTE — PLAN OF CARE
Problem: OCCUPATIONAL THERAPY ADULT  Goal: Performs self-care activities at highest level of function for planned discharge setting  See evaluation for individualized goals  Treatment Interventions: ADL retraining, Functional transfer training, Endurance training, Patient/family training, Equipment evaluation/education, Compensatory technique education, Continued evaluation, Energy conservation, Activityengagement          See flowsheet documentation for full assessment, interventions and recommendations  Outcome: Progressing  Limitation: Decreased ADL status, Decreased endurance, Decreased high-level ADLs, Decreased self-care trans  Prognosis: Good  Assessment: Pt is a 62yo/F adm to [address] [place] [address] for rehab following admission to [facility] for elective THR Posterolateral approach on 7/10/18  Pt WBAT on RLE and with hip precautions  Pt's chart reviewed prior to initiating evaluation; please refer above for details of PMH  PTA, pt living alone in a [facility] with NO restroom on first floor  Pt with family and friends in the area, however pt reports "everyone has their own lives" and therefore limited help available upon d/c  PTA, pt completely (I) with all self cares and functional mobility, works full time and drives  Currently, pt is at a mod (A) level for LB self cares, (S)/mod (I) for functional mobility and transfers  Pt below baseline level of independence 2' the following deficits impacting occupational performance: weakness, decreased strength, decreased balance, decreased tolerance and increased pain  Pt would benefit from skilled OT services to facilitate pt being able to safety engage in areas of occupational including sinkside grooming & hygiene, bathing, dressing, toileting, functional mobility/ADL transfers, laundry, house maintenance, cleaning, social participation activities, and meal prep       OT Discharge Recommendation:  (continued OT services)

## 2018-07-16 ENCOUNTER — TRANSITIONAL CARE MANAGEMENT (OUTPATIENT)
Dept: FAMILY MEDICINE CLINIC | Facility: CLINIC | Age: 63
End: 2018-07-16

## 2018-07-16 PROCEDURE — 97110 THERAPEUTIC EXERCISES: CPT

## 2018-07-16 PROCEDURE — 97116 GAIT TRAINING THERAPY: CPT

## 2018-07-16 PROCEDURE — 97530 THERAPEUTIC ACTIVITIES: CPT

## 2018-07-16 PROCEDURE — 97535 SELF CARE MNGMENT TRAINING: CPT

## 2018-07-16 RX ADMIN — VALSARTAN 80 MG: 40 TABLET, FILM COATED ORAL at 08:53

## 2018-07-16 RX ADMIN — CELECOXIB 200 MG: 200 CAPSULE ORAL at 08:50

## 2018-07-16 RX ADMIN — OXYCODONE HYDROCHLORIDE 10 MG: 5 TABLET ORAL at 10:08

## 2018-07-16 RX ADMIN — B-COMPLEX W/ C & FOLIC ACID TAB 1 TABLET: TAB at 08:56

## 2018-07-16 RX ADMIN — ENOXAPARIN SODIUM 40 MG: 40 INJECTION SUBCUTANEOUS at 08:49

## 2018-07-16 RX ADMIN — OXYCODONE HYDROCHLORIDE 10 MG: 5 TABLET ORAL at 18:32

## 2018-07-16 RX ADMIN — ASPIRIN 81 MG 81 MG: 81 TABLET ORAL at 08:53

## 2018-07-16 RX ADMIN — ASPIRIN 81 MG 81 MG: 81 TABLET ORAL at 17:17

## 2018-07-16 RX ADMIN — Medication 1 TABLET: at 08:52

## 2018-07-16 RX ADMIN — VENLAFAXINE HYDROCHLORIDE 150 MG: 150 CAPSULE, EXTENDED RELEASE ORAL at 08:56

## 2018-07-16 RX ADMIN — TUBERCULIN PURIFIED PROTEIN DERIVATIVE 5 UNITS: 5 INJECTION, SOLUTION INTRADERMAL at 12:28

## 2018-07-16 RX ADMIN — SENNOSIDES 8.6 MG: 8.6 TABLET, FILM COATED ORAL at 08:52

## 2018-07-16 RX ADMIN — MAGNESIUM OXIDE TAB 400 MG (241.3 MG ELEMENTAL MG) 200 MG: 400 (241.3 MG) TAB at 02:00

## 2018-07-16 RX ADMIN — DOCUSATE SODIUM 100 MG: 100 CAPSULE, LIQUID FILLED ORAL at 08:50

## 2018-07-16 RX ADMIN — VITAMIN D, TAB 1000IU (100/BT) 4000 UNITS: 25 TAB at 08:51

## 2018-07-16 RX ADMIN — DOCUSATE SODIUM 100 MG: 100 CAPSULE, LIQUID FILLED ORAL at 17:17

## 2018-07-16 NOTE — NURSING NOTE
Before the PPD was given, the pt was assessed for allergies to PPD in past, eggs and gelatin  The pt stated, "I have no allergies to eggs or gelatin and never had a problem in the past with PPD"

## 2018-07-16 NOTE — PLAN OF CARE
Problem: PHYSICAL THERAPY ADULT  Goal: Performs mobility at highest level of function for planned discharge setting  See evaluation for individualized goals  Treatment/Interventions: ADL retraining, Functional transfer training, Elevations, Therapeutic exercise, Endurance training, Patient/family training, Equipment eval/education, Bed mobility, Gait training, Spoke to nursing          See flowsheet documentation for full assessment, interventions and recommendations  Outcome: Progressing  Prognosis: Good  Problem List: Decreased strength, Decreased range of motion, Decreased endurance, Impaired balance, Decreased mobility, Decreased safety awareness, Pain, Orthopedic restrictions  Assessment: Pt is doing very well with amb - and steps - issued I sign for use ot BR and in de leon - pt will call for assit ( S)  For in and OOB  Pt used leglifter for  Sup <-> sit with  Min difficulty  Issued HEP with review of all ex and performance of standing ex  Pt educated in  [address] [place] [address],[address] to the L when possible , but if going to the R to ensure ER of R hip prior to moving the walker  Barriers to Discharge: Decreased caregiver support                See flowsheet documentation for full assessment

## 2018-07-16 NOTE — PLAN OF CARE
Problem: PHYSICAL THERAPY ADULT  Goal: Performs mobility at highest level of function for planned discharge setting  See evaluation for individualized goals  Treatment/Interventions: ADL retraining, Functional transfer training, Elevations, Therapeutic exercise, Endurance training, Patient/family training, Equipment eval/education, Bed mobility, Gait training, Spoke to nursing          See flowsheet documentation for full assessment, interventions and recommendations  Outcome: Progressing  Prognosis: Excellent  Problem List: Decreased strength, Decreased range of motion, Decreased endurance, Impaired balance, Decreased mobility, Impaired vision, Decreased skin integrity, Orthopedic restrictions, Pain  Assessment: Improvement assessed with activity tolerance ( ambulated 304 feet this PM), sit baalnce and stand balance  Pt able to get in and OOB on either side at home  Therefore practiced bed mobility on both sides of bed with MOD I ( easier to get into bed on right side and OOB on left side using right  leg  for all transfers)  Pt requested to practice a full flight of steps  Pt automatically ascended 1st 4 steps with RLE ( affected side)  This PT recommended pt ascend with LLE ( unaffected side)  Pt laughed and couldn't believe she  ascended " with the wrong leg"  Despite this, pt appeared very safe and steady on steps  Pt sequenced perfectly with descention  Vitals stable t/o entire PT tx session: Seated at rest: /57 ( no c/o dizziness with lower BP), HR 82, SPO2 (RA) 99%, after ambulating with a RW for 304 feet ( seated): SPO2 (RA) 99%, HR 84, after elevation training on full flight of 12 steps (seated): SPO2 (RA) 98%, HR 90  Barriers to Discharge:  (Environment deficits, lives alone, decreased social support)                See flowsheet documentation for full assessment

## 2018-07-16 NOTE — OCCUPATIONAL THERAPY NOTE
Occupational Therapy Treatment Note    Name:  Lake Rubi   MRN:   [id number]  Age:     59 y o  Patient Active Problem List   Diagnosis    Essential hypertension    Cervical radiculopathy    Irritable bowel syndrome    Nontoxic single thyroid nodule    Vitamin D deficiency    Vitamin B12 deficiency    Menopausal symptoms    Anxiety    Primary localized osteoarthritis of pelvic region and thigh    Hx of total hip arthroplasty, right    Chronic back pain    Leukocytosis    Class 1 obesity in adult     Hx of total hip arthroplasty, right [[id number]]      Subjective/Goals: "I've been doing pretty good"-- does continue with pain rating 5/10 from right hip through groin and knee  Pain medication provided via nursing  Vitals: 124/71 BP, 87 HR    OT total treatment time: 65 minutes (825-930)    Additional goals & Comments:  · Loaner equipment in room for ADL's: reacher, sock aide, leg   Will need sock aide and OTR on eval provided patient with shoe horn  · Patient reports ordering equipment for home:  Long handled sponge & leg   · Reports needing: walker   · Friend to purchase a walker tray-- TAYLOR educated patient to ensure tray fits on walker provided  · Commode over toilet at home for raised seat and arm rests  · NOTE:  Patient reports son coming 7/21 and staying for 3 days    Would like d/c by Friday 7/20/18 07/16/18 0930   Restrictions/Precautions   Weight Bearing Precautions Per Order Yes   RLE Weight Bearing Per Order WBAT   Other Precautions Fall Risk;THR   Pain Assessment   Pain Assessment 0-10   Pain Score 5   Pain Type Surgical pain   Pain Location Hip;Leg   Pain Orientation Right   Pain Descriptors Aching   Pain Frequency Intermittent   Pain Onset Ongoing   Clinical Progression Gradually improving   Patient's Stated Pain Goal No pain   Hospital Pain Intervention(s) Medication (See MAR)   ADL   Where Assessed Chair   Equipment Provided Reacher;Sock aid;Long-handled shoe horn;Leg    Grooming Assistance 6  Modified Independent   Grooming Deficit Supervision/safety;Standing with assistive device; Setup   Grooming Comments sinkside   UB Bathing Assistance 7  Independent   LB Bathing Assistance 6  Modified Independent   LB Bathing Deficit Right lower leg including foot; Left lower leg including foot   LB Bathing Comments no long handled sponge to complete below knees to maintain THP   UB Dressing Assistance 7  Independent   LB Dressing Assistance 5  Supervision/Setup   LB Dressing Deficit Setup;Verbal cueing;Supervision/safety; Increased time to complete;Use of adaptive equipment   LB Dressing Comments problem solving for AE use with TEDs and soft shoes-- used sock aide to start soft shoes over toes and then shoe horn to manage back  Patient with good problem solving and adherance to precautions  Toileting Assistance  6  Modified independent   Toileting Deficit Increased time to complete;Supervison/safety   Toileting Comments good balance noted; ed on caution to advoid twisting with hygiene and flushing of toilet   Kitchen Mobility   Kitchen Mobility Comments discussed goal patient reports plan to use a walker upon d/c and that girlfriend to get patient a walker tray for transport   Functional Standing Tolerance   Time 4-5 minutes   Activity functional mobility throughout room   Comments no LOB and good safety noted   Bed Mobility   Additional Comments provided with leg -- bed mobility not assesed this date   Transfers   Sit to Stand 6  Modified independent   Additional items Increased time required;Armrests   Stand to Sit 6  Modified independent   Additional items Armrests; Increased time required   Stand pivot 6  Modified independent   Additional items Armrests; Increased time required   Toilet transfer 6  Modified independent   Additional items Armrests; Increased time required   Functional Mobility   Functional Mobility 6  Modified independent   Additional Comments (patient taking self to/from bathroom since admitted to unit)   Additional items Rolling walker   Toilet Transfers   Toilet Transfer From Rolling walker   Toilet Transfer Type To and from   Toilet Transfer to Raised toilet seat with rails   Toilet Transfer Technique Ambulating   Toilet Transfers Modified independence   Tub Transfers   Tub Transfers Comments patient reports having a shower bench not chair   Therapeutic Excerise-Strength   UE Strength (2# bilateral weight in all planes 2x15 with good tolerance)   Cognition   Overall Cognitive Status [facility]   Arousal/Participation Alert; Responsive;Arousable; Cooperative   Attention Within functional limits   Orientation Level Oriented X4   Memory Within functional limits   Following Commands Follows all commands and directions without difficulty   Comments Good recall of 3/3 THP's   Additional Activities   Additional Activities Comments Balance: Good static; Fair+ Dynamic   Activity Tolerance   Activity Tolerance Patient tolerated treatment well  (Fair/fair+)   Assessment   Assessment Patient seen this date for an ADL for OT  Patient in bathroom upon arrival completing her bathing sinkside with good safety noted and no LOB  At present patient does not have a long handled sponge for LE bathing/management however reports that she ordered once via [employer] along with a leg   She reports currently owning 2 reachers, commode and tub bench from previous hip surgery 7 years ago  Leg , sock aide and shoe horn remain in room for continued use while on unit and patient reports needing a walker  Patient able to recall THP and follow throughout session this date without issue  Patient presents with good safety and problem solving  Continued OT recommended at this time with goals as set by OTR  At end of session patient remains in room with all needs within reach       Plan   Treatment Interventions ADL retraining;Functional transfer training;UE strengthening/ROM; Activityengagement; Energy conservation   Goal Expiration Date 07/25/18   Treatment Day 2   Recommendation   OT Discharge Recommendation Home OT   OT - OK to Discharge No         STG=LTG  Pt will achieve the following goals within 1 weeks     *G&H standing sinkside with (I)} and with Good balance for inc'd independence with self cares **completed this date without LOB**     *ADL transfers with mod (I) for inc'd independence with ADLs/purposeful tasks **MI this date with good safety**     *LB ADL at mod (I) level using AE prn for inc'd independence with self cares  **Supervision with increased time and AE education**     *Toileting with mod (I) for clothing management and hygiene for return to PLOF with personal care **MI this date**     *Increase static stand balance to Good and dyn stand balance to Fair+/Good- for inc'd safety with standing purposeful tasks  **Static: Good; Dynamic Fair+**     *Increase stand tolerance x7-10m m for inc'd tolerance with standing purposeful tasks **4-5 minutes**     *Activity tolerance- Good- for inc'd tolerance with purposeful tasks **Fair/Fair+**     *Participate in 10m UE therex to increase overall stamina/activity tolerance for purposeful tasks **Completed this date**     *Tub/shower transfer using Tub seat/transfer bench with mod (I) for inc'd safety and independence with bathing **reports owning BENCH**     *Kitchen mobility- mod (I) for inc'd independence and safety negotiating kitchen environment     *Light meal prep- mod (I) to increase independence with preparing self nourishment     *Bed mobility- mod (I) for inc'd independence to manage own comfort and initiate EOB & OOB purposeful tasks     *Pt will carry out light homemaking tasks (laundry, bed making, etc) mod (I) with good safety **Reports will have assist with laundry upon d/c until she feels confident in doing steps to basement**     * Pt will safely carry out ADLs as well as ADL transfers without cues to maintain THR precautions 100% of the time  **100% follow with ADL**     *Pt will recall 3/3 hip precautions for inc'd safety with all purposeful tasks  **recalled without issue**    Ladnry Parcel  7/16/2018

## 2018-07-16 NOTE — PLAN OF CARE
Problem: OCCUPATIONAL THERAPY ADULT  Goal: Performs self-care activities at highest level of function for planned discharge setting  See evaluation for individualized goals  Treatment Interventions: ADL retraining, Functional transfer training, Endurance training, Patient/family training, Equipment evaluation/education, Compensatory technique education, Continued evaluation, Energy conservation, Activityengagement          See flowsheet documentation for full assessment, interventions and recommendations  Outcome: Progressing  Limitation: Decreased ADL status, Decreased endurance, Decreased high-level ADLs, Decreased self-care trans  Prognosis: Good  Assessment: Patient seen this date for an ADL for OT  Patient in bathroom upon arrival completing her bathing sinkside with good safety noted and no LOB  At present patient does not have a long handled sponge for LE bathing/management however reports that she ordered once via [employer] along with a leg   She reports currently owning 2 reachers, commode and tub bench from previous hip surgery 7 years ago  Leg , sock aide and shoe horn remain in room for continued use while on unit and patient reports needing a walker  Patient able to recall THP and follow throughout session this date without issue  Patient presents with good safety and problem solving  Continued OT recommended at this time with goals as set by OTR  At end of session patient remains in room with all needs within reach         OT Discharge Recommendation: Home OT  OT - OK to Discharge: No

## 2018-07-16 NOTE — SOCIAL WORK
SW met with patient to review admission packet and signed consents  Patient selected [facility] (hx of using 7 years ago)  Patient reports her brother Naomi Cole #[phone] is her POA for healthcare  Patient declines a [address] at this time with herself or family  SW discussed psychosocial assessment  Patient admitted for continued rehabilitation services s/p right total hip arthroplasty by Dr Mary Ann Llanes  Patient resides alone in a house with 1 FÉLIX and full flight of steps to bedroom/bathroom with R HR  Patient confirmed she owns a RW (purchased 7 years ago), a tub transfer bench, BSC and quad cane  Patient would like another RW for her second floor upon discharge  Patient confirmed that her PCP is Zulma No and prefers to schedule her own follow-up appointments  Prior to admission patient was independent with ADLs  Patient drives and is currently employed  Patient does not use any in-home or community resources at this time  Patient reports mental health treatment for anxiety by her PCP and denies substance abuse hx  Patient's pharmacy preference is [facility] on [address]  Patient has no further questions or concerns at this time

## 2018-07-16 NOTE — PHYSICAL THERAPY NOTE
07/16/18 1102   Pain Assessment   Pain Assessment 0-10   Pain Score 4   Pain Type Surgical pain   Pain Location (R hip, groin,thigh to knee)   Pain Descriptors Aching;Heaviness   Pain Frequency Constant/continuous   Pain Onset Ongoing   Patient's Stated Pain Goal No pain   Hospital Pain Intervention(s) Medication (See MAR); Cold applied; Ambulation/increased activity   Diversional Activities (conversation)   Response to Interventions (no increase pain with activity)   Restrictions/Precautions   RLE Weight Bearing Per Order WBAT   Other Precautions THR   General   Chart Reviewed Yes   Family/Caregiver Present No   Cognition   Overall Cognitive Status WFL   Arousal/Participation Alert; Cooperative   Attention Within functional limits   Orientation Level Oriented X4   Following Commands Follows all commands and directions without difficulty   Subjective   Subjective i ahve been taking myself to the BR   Bed Mobility   Supine to Sit 6  Modified independent   Additional items Leg    Sit to Supine 6  Modified independent   Additional items Leg   (practiced x 2)   Transfers   Sit to Stand 6  Modified independent   Additional items (with precautions)   Stand to Sit 6  Modified independent   Additional items (occ cues to not bend forward too much)   Stand pivot 6  Modified independent   Additional items (cues for caution when turning to the R ( for IR precaution))   Additional Comments to BR  to manage door open / close   Ambulation/Elevation   Gait pattern Antalgic;Decreased R stance; Inconsistent natacha   Gait Assistance 6  Modified independent   Assistive Device Rolling walker   Distance 62', 120' and 182'   Stair Management Assistance 5  Supervision   Stair Management Technique One rail R;Step to pattern  (with QC)   Number of Stairs 12   Balance   Ambulatory Fair   Exercises   Hip Flexion Standing;20 reps  (march with RW)   Hip Extension Standing;20 reps  (wiht RW)   Knee PROM Flexion Standing;20 reps  (B x 20 reps active)   THR (review of precautions with 3/3 recall- issued HEP)   Assessment   Prognosis Good   Assessment Pt is doing very well with amb - and steps - issued I sign for use ot BR and in de leon - pt will call for assit ( S)  For in and OOB  Pt used leglifter for  Sup <-> sit with  Min difficulty  Issued HEP with review of all ex and performance of standing ex  Pt educated in  [address] [place] [address],[address] to the L when possible , but if going to the R to ensure ER of R hip prior to moving the walker      Barriers to Discharge Decreased caregiver support   Goals   Patient Goals to get better at in and OOB   STG Expiration Date 07/20/18   LTG Expiration Date 07/24/18   Treatment Day 2   Plan   Treatment/Interventions (cont as per POC)   PT Frequency (6x/week)   Recommendation   Equipment Recommended (RW)   Additional Comments CP to hip fter session

## 2018-07-17 LAB
ALBUMIN SERPL BCP-MCNC: 3.8 G/DL (ref 3–5.2)
ALP SERPL-CCNC: 73 U/L (ref 43–122)
ALT SERPL W P-5'-P-CCNC: 32 U/L (ref 9–52)
AST SERPL W P-5'-P-CCNC: 23 U/L (ref 14–36)
BILIRUB DIRECT SERPL-MCNC: 0.1 MG/DL
BILIRUB SERPL-MCNC: 0.9 MG/DL
PROT SERPL-MCNC: 7 G/DL (ref 5.9–8.4)

## 2018-07-17 PROCEDURE — 97116 GAIT TRAINING THERAPY: CPT

## 2018-07-17 PROCEDURE — 97110 THERAPEUTIC EXERCISES: CPT

## 2018-07-17 PROCEDURE — 97535 SELF CARE MNGMENT TRAINING: CPT

## 2018-07-17 PROCEDURE — 97530 THERAPEUTIC ACTIVITIES: CPT

## 2018-07-17 PROCEDURE — 99308 SBSQ NF CARE LOW MDM 20: CPT | Performed by: FAMILY MEDICINE

## 2018-07-17 PROCEDURE — 80076 HEPATIC FUNCTION PANEL: CPT | Performed by: NURSE PRACTITIONER

## 2018-07-17 RX ADMIN — DOCUSATE SODIUM 100 MG: 100 CAPSULE, LIQUID FILLED ORAL at 17:28

## 2018-07-17 RX ADMIN — DOCUSATE SODIUM 100 MG: 100 CAPSULE, LIQUID FILLED ORAL at 08:19

## 2018-07-17 RX ADMIN — VENLAFAXINE HYDROCHLORIDE 150 MG: 150 CAPSULE, EXTENDED RELEASE ORAL at 08:19

## 2018-07-17 RX ADMIN — VALSARTAN 80 MG: 40 TABLET, FILM COATED ORAL at 08:18

## 2018-07-17 RX ADMIN — ASPIRIN 81 MG 81 MG: 81 TABLET ORAL at 08:19

## 2018-07-17 RX ADMIN — ASPIRIN 81 MG 81 MG: 81 TABLET ORAL at 17:28

## 2018-07-17 RX ADMIN — MAGNESIUM OXIDE TAB 400 MG (241.3 MG ELEMENTAL MG) 200 MG: 400 (241.3 MG) TAB at 02:00

## 2018-07-17 RX ADMIN — Medication 1 TABLET: at 08:18

## 2018-07-17 RX ADMIN — TRAMADOL HYDROCHLORIDE 50 MG: 50 TABLET, COATED ORAL at 08:51

## 2018-07-17 RX ADMIN — SENNOSIDES 8.6 MG: 8.6 TABLET, FILM COATED ORAL at 08:19

## 2018-07-17 RX ADMIN — VITAMIN D, TAB 1000IU (100/BT) 4000 UNITS: 25 TAB at 08:17

## 2018-07-17 RX ADMIN — CELECOXIB 200 MG: 200 CAPSULE ORAL at 08:17

## 2018-07-17 RX ADMIN — ENOXAPARIN SODIUM 40 MG: 40 INJECTION SUBCUTANEOUS at 08:20

## 2018-07-17 RX ADMIN — B-COMPLEX W/ C & FOLIC ACID TAB 1 TABLET: TAB at 08:17

## 2018-07-17 RX ADMIN — TRAMADOL HYDROCHLORIDE 50 MG: 50 TABLET, COATED ORAL at 00:32

## 2018-07-17 NOTE — ASSESSMENT & PLAN NOTE
Patient was transferred from [facility] after she underwent a right total hip arthroplasty by Eleno Ryan on Jul 10, 5388  She is doing well with inpatient rehab and will likely be going home as early as tomorrow with David LILLY  Lovenox d/c'd, as pt is on 81mg ASA BID, which is what is recommended by Dr Seth Kitchen  Per nursing, they spoke with Dr Theodore LALA, Nino Brink, who stated additional anticoagulation is not necessary  Pain management with prn tramadol and tylenol

## 2018-07-17 NOTE — OCCUPATIONAL THERAPY NOTE
Occupational Therapy Treatment Note    Name:  Marlana Kussmaul Da Re   MRN:   [id number]  Age:     59 y o  Patient Active Problem List   Diagnosis    Essential hypertension    Cervical radiculopathy    Irritable bowel syndrome    Nontoxic single thyroid nodule    Vitamin D deficiency    Vitamin B12 deficiency    Menopausal symptoms    Anxiety    Primary localized osteoarthritis of pelvic region and thigh    Hx of total hip arthroplasty, right    Chronic back pain    Leukocytosis    Class 1 obesity in adult     Hx of total hip arthroplasty, right [[id number]]      Subjective/Goals: "I think I'll go on Thursday"    OT total treatment time:11:20-12:13    Additional goals & Comments: Pt making good progress towards goals     07/17/18 1213   Restrictions/Precautions   Weight Bearing Precautions Per Order No   Other Precautions (THP)   Pain Assessment   Pain Assessment 0-10   Pain Score (4)   Pain Type Surgical pain   Pain Orientation (Right)   Hospital Pain Intervention(s) Medication (See MAR)   Diversional Activities (Positioning)   ADL   LB Dressing Deficit (S to doff /don TEDS and shoes w/ AE)   Toileting Comments (MI)   Kitchen Mobility   Kitchen Mobility Comments (MI w/ mobillity edu for [facility] and safe transport)   Functional Standing Tolerance   Time (10 min)   Activity Kitchen and func mob   Transfers   Sit to Stand (MI w/ RW)   Stand to Sit (MI w/ RW)   Stand pivot (MI w/RW)   Additional Comments (Static stand bal G dyn F+/G-)   Functional Mobility   Functional Mobility (MI w/ RW)   Additional Comments (good adherence to THP)   Shower Transfers   Shower Transfers Comments (MI w/ tub bench transfer 2 trials w/o leg )   Therapeutic Excerise-Strength   UE Strength (Restorator 10 min)   Cognition   Arousal/Participation Alert; Cooperative   Additional Activities   Additional Activities Comments ( 10 min ball toss in standing F/F+ Unsupp bal)   Activity Tolerance   Activity Tolerance (G-)   Assessment Assessment Pt making good progress towards goals   Plan   Treatment Interventions ADL retraining;UE strengthening/ROM; Equipment evaluation/education   Goal Expiration Date (7/25)   Treatment Day 3   Recommendation   OT Discharge Recommendation (Home w/ services)   OT - OK to Discharge No      Patient participated in Skilled OT session this date with interventions consisting of LB dressing, Kitchen mobility, tub bench transfer, UE strength/Act misa and Dyn stand bal and stand misa   Patient agreeable to OT treatment session, upon arrival patient was found seated in recliner attempting to don TEDS  In comparison to previous session, patient with improvements in Dyn bal, stand misa   Patient requiring edu for safe techniques  Patient continues to be functioning below baseline level, occupational performance remains limited secondary to factors listed above and increased risk for falls and injury  From OT standpoint, recommendation at time of d/c would be home OT  Patient to benefit from continued Occupational Therapy treatment while in the [facility] to address deficits as defined above and maximize level of functional independence with ADLs and functional mobility       STG=LTG  Pt will achieve the following goals within 1 weeks     *G&H standing sinkside with (I)} and with Good balance for inc'd independence with self cares   ~ NOT addressed this session     *ADL transfers with mod (I) for inc'd independence with ADLs/purposeful tasks     ~MOD I     *LB ADL at mod (I) level using AE prn for inc'd independence with self cares   ~ S To DOFF/DON TEDS w/ AE     *Toileting with mod (I) for clothing management and hygiene for return to PLOF with personal care ~ PER Pt MI     *Increase static stand balance to Good and dyn stand balance to Fair+/Good- for inc'd safety with standing purposeful tasks  ~ STATIC G,  DYN F+/G-     *Increase stand tolerance x7-10m m for inc'd tolerance with standing purposeful tasks ~ 10 MIN     *Activity tolerance- Good- for inc'd tolerance with purposeful tasks  ~ G-     *Participate in 10m UE therex to increase overall stamina/activity tolerance for purposeful tasks  ~ RESTORATOR 10 min     *Tub/shower transfer using Tub seat/transfer bench with mod (I) for inc'd safety and independence with bathing   ~ MI w/ TRANSFER BENCH 2 trials     *Kitchen mobility- mod (I) for inc'd independence and safety negotiating kitchen environment  ~ MI w/ RW     *Light meal prep- mod (I) to increase independence with preparing self nourishment ~ NOT ADDRESSED this session     *Bed mobility- mod (I) for inc'd independence to manage own comfort and initiate EOB & OOB purposeful tasks  ~ NOT ADDRESSED this session     *Pt will carry out light homemaking tasks (laundry, bed making, etc) mod (I) with good safety ~ NOT ADDRESSED this session     * Pt will safely carry out ADLs as well as ADL transfers without cues to maintain THR precautions 100% of the time    ~ACHIEVED THIS SESSION     *Pt will recall 3/3 hip precautions for inc'd safety with all purposeful tasks   ~~ 3/3 THIS SESSION

## 2018-07-17 NOTE — NURSING NOTE
Addedum, per nurse p request , right hip incision checked, dressing on incision visible, no acute drainage or redness  New dressing applied

## 2018-07-17 NOTE — PROGRESS NOTES
Progress Note Gerry Rubi 1955, 59 y o  female MRN: [id number]    Unit/Bed#: -01 Encounter: [id number]    Primary Care Provider: Talon Rose DO   Date and time admitted to hospital: 7/13/2018  8:16 PM        * Hx of total hip arthroplasty, right   Assessment & Plan    Patient was transferred from [facility] after she underwent a right total hip arthroplasty by Laura Gonzales on Jul 10, 1325  She is doing well with inpatient rehab and will likely be going home as early as tomorrow with [employer]  Lovenox d/c'd, as pt is on 81mg ASA BID, which is what is recommended by Dr Michael Sánchez  Per nursing, they spoke with Dr Shantell LALA, Trinity Montes, who stated additional anticoagulation is not necessary  Pain management with prn tramadol and tylenol  Class 1 obesity in adult   Assessment & Plan    Encourage exercises and healthy diet        Leukocytosis   Assessment & Plan    Most likely postoperative - resolved        Chronic back pain   Assessment & Plan    Continue current pain regimen  Irritable bowel syndrome   Assessment & Plan    Monitor for diarrhea or constipation  No complaints at this time  Essential hypertension   Assessment & Plan    Patient allergic to ACE inhibitor  Stable on valsartan  VTE Pharmacologic Prophylaxis:   Pharmacologic: Other Medication: BID aspirin  Mechanical VTE Prophylaxis in Place: Yes    Patient Centered Rounds: I have performed bedside rounds with nursing staff today  Discussions with Specialists or Other Care Team Provider: Case management, nursing  Education and Discussions with Family / Patient: Plan of care discussed with patient  Time Spent for Care: 20 minutes  More than 50% of total time spent on counseling and coordination of care as described above      Current Length of Stay: 4 day(s)    Current Patient Status: Inpatient   Certification Statement: Projected d/c by thursday    Discharge Plan: Home with services and f/u with ortho and pcp    Code Status: Level 1 - Full Code      Subjective:   Pt denies all complaints  Ambulating without difficulty  Pain well controlled  No CP, SOB, N/V/D, constipation, or dysuria  Objective:     Vitals:   Temp (24hrs), Av 4 °F (36 3 °C), Min:97 4 °F (36 3 °C), Max:97 4 °F (36 3 °C)    HR:  [77-82] 77  Resp:  [20-21] 21  BP: (123-124)/(59-74) 124/74  SpO2:  [97 %-99 %] 99 %  Body mass index is 36 25 kg/m²  Input and Output Summary (last 24 hours): Intake/Output Summary (Last 24 hours) at 18 1303  Last data filed at 18 1700   Gross per 24 hour   Intake              480 ml   Output                0 ml   Net              480 ml       Physical Exam:     Physical Exam   Constitutional: She is oriented to person, place, and time  She appears well-developed and well-nourished  HENT:   Head: Normocephalic and atraumatic  Neck: No JVD present  Cardiovascular: Normal rate, regular rhythm, normal heart sounds and intact distal pulses  Exam reveals no gallop and no friction rub  No murmur heard  Pulmonary/Chest: Effort normal and breath sounds normal  No respiratory distress  She has no wheezes  She has no rales  She exhibits no tenderness  Abdominal: Soft  Bowel sounds are normal  She exhibits no distension  There is no tenderness  There is no rebound and no guarding  Musculoskeletal: She exhibits no edema  KRYSTLE stockings in place   Neurological: She is alert and oriented to person, place, and time  Skin: Skin is warm and dry  Post op dressing clean, dry, and intact  Psychiatric: She has a normal mood and affect           Additional Data:     Labs:      Results from last 7 days  Lab Units 18  0656   WBC Thousand/uL 7 20   HEMOGLOBIN g/dL 10 3*   HEMATOCRIT % 30 5*   PLATELETS Thousands/uL 133*   NEUTROS PCT % 62   LYMPHS PCT % 26   MONOS PCT % 10   EOS PCT % 2       Results from last 7 days  Lab Units 18  0656   SODIUM mmol/L 136* POTASSIUM mmol/L 4 0   CHLORIDE mmol/L 98   CO2 mmol/L 30   BUN mg/dL 13   CREATININE mg/dL 0 47*   CALCIUM mg/dL 8 4   GLUCOSE RANDOM mg/dL 92                     * I Have Reviewed All Lab Data Listed Above  * Additional Pertinent Lab Tests Reviewed:  Most recent labs reviewed    Imaging:    Imaging Reports Reviewed Today Include: none  Imaging Personally Reviewed by Myself Includes:  none    Recent Cultures (last 7 days):           Last 24 Hours Medication List:     Current Facility-Administered Medications:  acetaminophen 650 mg Oral Q6H PRN Shellie Bingham MD   aspirin 81 mg Oral BID Shellie Bingham MD   celecoxib 200 mg Oral Daily Shellie Bingham MD   cholecalciferol 4,000 Units Oral Daily Shellie Bingham MD   docusate sodium 100 mg Oral BID Shellie Bingham MD   magnesium oxide 200 mg Oral Daily Shellie Bingham MD   multivitamin stress formula 1 tablet Oral Daily Shellie Bingham MD   multivitamin-minerals 1 tablet Oral Daily Shellie Bingham MD   senna 1 tablet Oral Daily Shellie Bingham MD   traMADol 50 mg Oral Q6H PRN Shellie Bingham MD   tuberculin 5 Units Intradermal Once SHEFALI Antunez   valsartan 80 mg Oral Daily Shellie Bingham MD   venlafaxine 150 mg Oral Daily Shellie Bingham MD        Today, Patient Was Seen By: SHEFLAI Arauz

## 2018-07-17 NOTE — SOCIAL WORK
PT/OT clearing patient for discharge and patient is expressing that she would like to be discharge home  Patient's nurse confirmed no known medical complications impacting discharge at this time  SW met with patient to discuss discharge plan  Patient is agreeable to sign [address] with LCD 7/19  Placed in bin to be scanned  Patient will contact her friend for transportation and will advise SW when time is confirmed  Patient will need a RW for discharge  Cj's liaison to deliver as soon as possible

## 2018-07-17 NOTE — PLAN OF CARE
Problem: PHYSICAL THERAPY ADULT  Goal: Performs mobility at highest level of function for planned discharge setting  See evaluation for individualized goals  Treatment/Interventions: ADL retraining, Functional transfer training, Elevations, Therapeutic exercise, Endurance training, Patient/family training, Equipment eval/education, Bed mobility, Gait training, Spoke to nursing          See flowsheet documentation for full assessment, interventions and recommendations  Prognosis: Excellent  Problem List: Decreased strength, Decreased range of motion, Pain  Assessment: Pt felt RW was loow low a taller RW wasnt available but a wide taller RW was available trialed that pt flelt more comfortable & stading more upright   Lenin flight of steps wel ascended with proper foot  Barriers to Discharge:  (Environment deficits, lives alone, decreased social support)                See flowsheet documentation for full assessment

## 2018-07-17 NOTE — PLAN OF CARE
Problem: PHYSICAL THERAPY ADULT  Goal: Performs mobility at highest level of function for planned discharge setting  See evaluation for individualized goals  Treatment/Interventions: ADL retraining, Functional transfer training, Elevations, Therapeutic exercise, Endurance training, Patient/family training, Equipment eval/education, Bed mobility, Gait training, Spoke to nursing          See flowsheet documentation for full assessment, interventions and recommendations  Outcome: Progressing  Prognosis: Excellent  Problem List: Decreased strength, Decreased range of motion, Decreased endurance, Impaired balance, Decreased mobility, Decreased coordination, Impaired vision, Decreased skin integrity, Orthopedic restrictions, Pain  Assessment: Initiated training on curb step 2 x  holding onto doorjamb on left and SPC in right hand with ascention ( to simulate 1STE home holding onto simulated left post for support) with Supervision  Plan to use NBQC on curb step tomorrow  Pt reported that she spoke with  and plans to be discharged back to home this Thursday  Her son is coming in from [place] to visit with her on Friday, 7/20/18  [facility]  Esthela Kumar) delivered a RW and this PT adjusted RW to appropriate height  Pt reported that her right lateral hip pain is improving daily and is she is trying her best not to take pain meds too often  Improvement assessed with static and dynamic balance today  Pt demonstrated ability to maintain RLE THP's 100 % of time; during all functional mobility activities  Vitals stable t/o entire PT tx session  Barriers to Discharge:  (Environment deficits, lives alone, decreased social support)  Barriers to Discharge Comments:  (Lives alone, environmental deficits)  Recommendation:  (Home PT to maximize safe mobility within her personal envt)     PT - OK to Discharge: Yes    See flowsheet documentation for full assessment

## 2018-07-17 NOTE — PHYSICAL THERAPY NOTE
07/17/18 [facility]   Pain Assessment   Pain Assessment 0-10   Pain Score 4   Pain Type Surgical pain   Pain Location Hip   Pain Orientation Right   Hospital Pain Intervention(s) Medication (See MAR)   Precautions   Total Hip Replacement (hip precautions)   Restrictions/Precautions   Weight Bearing Precautions Per Order Yes   RLE Weight Bearing Per Order WBAT   Other Precautions (hip precautions)   General   Chart Reviewed Yes   Family/Caregiver Present No   Cognition   Overall Cognitive Status WFL   Attention Within functional limits   Following Commands Follows all commands and directions without difficulty   Subjective   Subjective ("I feel ready to go")   Transfers   Sit to Stand 6  Modified independent   Stand to Sit 6  Modified independent   Stand pivot 6  Modified independent   Additional Comments /58  HR 70   Ambulation/Elevation   Gait Assistance 6  Modified independent   Assistive Device Rolling walker   Distance 775' 144' 108'   Stair Management Assistance 6  Modified independent   Additional items (Mod I in S environment)   Stair Management Technique One rail R   Number of Stairs (12 full flight)   Curbs (up/down 2 curb steps with RW dist S VC for sequencing )   Activity Tolerance   Activity Tolerance Patient tolerated treatment well   Exercises   Hip Flexion (standing 20)   Hip Extension (standing 20 )   Knee AROM Long Arc Quad (R 20 L 20 2#)   Assessment   Prognosis Excellent   Problem List Decreased strength;Decreased range of motion;Pain   Assessment Pt felt RW was loow low a taller RW wasnt available but a wide taller RW was available trialed that pt flelt more comfortable & stading more upright   Lenin flight of steps wel ascended with proper foot  Goals   Patient Goals (Return home by Saturday)   STG Expiration Date 07/24/18   LTG Expiration Date 07/24/18   Treatment Day 3   Plan   Treatment/Interventions Elevations; Therapeutic exercise;Gait training   PT Frequency Twice a day   Recommendation Equipment Recommended Walker                                                                                     PHYSICAL THERAPY NOTE          Patient Name: Sharita Rodriguez Brittney  [id number] Date: 7/17/2018   Rx time 55 min  Agatha Mello PTA

## 2018-07-17 NOTE — PHYSICAL THERAPY NOTE
Physical Therapy Treatment Note ( 1416 to 1455)       07/17/18 0043   Pain Assessment   Pain Assessment 0-10   Pain Score 3   Pain Type Surgical pain   Pain Location (Right lateral thigh)   Pain Frequency Constant/continuous   Clinical Progression Gradually improving   Hospital Pain Intervention(s) Medication (See MAR); Cold applied;Repositioned; Ambulation/increased activity   Response to Interventions (Pt feels her pain is slowly improving)   Precautions   Total Hip Replacement (Right THR precaution)   Restrictions/Precautions   RLE Weight Bearing Per Order (WBAT RLE)   General   Chart Reviewed Yes   Response to Previous Treatment Patient with no complaints from previous session  Family/Caregiver Present No   Cognition   Overall Cognitive Status Impaired   Arousal/Participation Alert; Cooperative   Memory Within functional limits   Subjective   Subjective (" I'm going home this Thursday")   Transfers   Sit to Stand 6  Modified independent   Stand to Sit 6  Modified independent   Stand pivot 6  Modified independent   Additional items (SPT with RW; appears safe and steady)   Ambulation/Elevation   Gait pattern Inconsistent natacha; Foward flexed; Short stride  (Ambualted with newly issued [employer] RW today)   Gait Assistance (Amb with a RW for 315 feet; then 90 feet with MOD I)   Balance   Static Sitting Normal   Static Standing (Good (-) with RW)   Dynamic Standing (Fair+ with RW)   Higher level balance (Fair+ with RW)   Exercises   Glute Sets (Bilateral x 3 minutes ( 3 second hold))   Hip Flexion (Bilateral hip flexion AROM: 10 x 3)   Hip Abduction Standing  (15 x 2)   Hip Adduction (With ball x 3 minutes (3 second hold))   Knee AROM (Bilateral knee flexion ( stand): 10 x 3)   Ankle Pumps (Stand ankle pumps: 20 x)   Squat (15 x 2)   Assessment   Prognosis Excellent   Problem List Decreased strength;Decreased range of motion;Decreased endurance; Impaired balance;Decreased mobility; Decreased coordination; Impaired vision;Decreased skin integrity;Orthopedic restrictions;Pain   Assessment Initiated training on curb step 2 x  holding onto doorjamb on left and SPC in right hand with ascention ( to simulate 1STE home holding onto simulated left post for support) with Supervision  Plan to use NBQC on curb step tomorrow  Pt reported that she spoke with  and plans to be discharged back to home this Thursday  Her son is coming in from [place] to visit with her on Friday, 7/20/18  [employer]  Tommy Bhatti) delivered a RW and this PT adjusted RW to appropriate height  Pt reported that her right lateral hip pain is improving daily and is she is trying her best not to take pain meds too often  Improvement assessed with static and dynamic balance today  Pt demonstrated ability to maintain RLE THP's 100 % of time; during all functional mobility activites  Vitals stable t/o entire PT tx session  Barriers to Discharge Comments (Lives alone, environmental deficits)   Goals   Patient Goals (" I plan to go home on Thursday")   STG Expiration Date (7/19/2018 as planned on PT Eval, pt going home on 7/19/18)   LTG Expiration Date (7/24/2018 as planned on PT Eval, pt going home on 7/19/18)   Treatment Day (DAy 3  ( PM PT tx session)))   Plan   Treatment/Interventions Functional transfer training;LE strengthening/ROM; Elevations; Therapeutic exercise; Endurance training;Patient/family training;Equipment eval/education; Bed mobility;Gait training; Compensatory technique education;Spoke to nursing;Spoke to case management;OT   Progress Improving as expected   PT Frequency (Planned on PT Eval for 6x/week  )   Recommendation   Recommendation (Home PT to maximize safe mobility within her personal envt)   PT - OK to Discharge Yes   Additional Comments (Pt plans to go home 7/19/18)     Vitals:  Seated at rest in recliner chair: SPO2 (RA) 98%, HR 86  After ambulating with a RW for 315 feet ( seated): SPO2 (RA) 99%,   After curb step training ( seated): SPO2 (RA) 99%, HR 98    Karin Garcia, PT

## 2018-07-17 NOTE — PLAN OF CARE
Problem: Potential for Falls  Goal: Patient will remain free of falls  INTERVENTIONS:  - Assess patient frequently for physical needs  -  Identify cognitive and physical deficits and behaviors that affect risk of falls    -  [place] fall precautions as indicated by assessment   - Educate patient/family on patient safety including physical limitations  - Instruct patient to call for assistance with activity based on assessment  - Modify environment to reduce risk of injury  - Consider OT/PT consult to assist with strengthening/mobility   Outcome: Progressing

## 2018-07-17 NOTE — PLAN OF CARE
Problem: OCCUPATIONAL THERAPY ADULT  Goal: Performs self-care activities at highest level of function for planned discharge setting  See evaluation for individualized goals  Treatment Interventions: ADL retraining, Functional transfer training, Endurance training, Patient/family training, Equipment evaluation/education, Compensatory technique education, Continued evaluation, Energy conservation, Activityengagement          See flowsheet documentation for full assessment, interventions and recommendations      Outcome: Progressing  Limitation: Decreased ADL status, Decreased endurance, Decreased high-level ADLs, Decreased self-care trans  Prognosis: Good  Assessment: Pt making good progress towards goals     OT Discharge Recommendation:  (Home w/ services)  OT - OK to Discharge: No      Comments: Bárbara Howell

## 2018-07-18 PROCEDURE — 97530 THERAPEUTIC ACTIVITIES: CPT

## 2018-07-18 PROCEDURE — 97110 THERAPEUTIC EXERCISES: CPT

## 2018-07-18 PROCEDURE — 97535 SELF CARE MNGMENT TRAINING: CPT

## 2018-07-18 PROCEDURE — 97116 GAIT TRAINING THERAPY: CPT

## 2018-07-18 RX ADMIN — ASPIRIN 81 MG 81 MG: 81 TABLET ORAL at 08:15

## 2018-07-18 RX ADMIN — TRAMADOL HYDROCHLORIDE 50 MG: 50 TABLET, COATED ORAL at 20:46

## 2018-07-18 RX ADMIN — B-COMPLEX W/ C & FOLIC ACID TAB 1 TABLET: TAB at 08:14

## 2018-07-18 RX ADMIN — Medication 1 TABLET: at 08:14

## 2018-07-18 RX ADMIN — ASPIRIN 81 MG 81 MG: 81 TABLET ORAL at 17:17

## 2018-07-18 RX ADMIN — SENNOSIDES 8.6 MG: 8.6 TABLET, FILM COATED ORAL at 08:15

## 2018-07-18 RX ADMIN — VITAMIN D, TAB 1000IU (100/BT) 4000 UNITS: 25 TAB at 08:14

## 2018-07-18 RX ADMIN — TRAMADOL HYDROCHLORIDE 50 MG: 50 TABLET, COATED ORAL at 08:36

## 2018-07-18 RX ADMIN — VALSARTAN 80 MG: 40 TABLET, FILM COATED ORAL at 08:14

## 2018-07-18 RX ADMIN — DOCUSATE SODIUM 100 MG: 100 CAPSULE, LIQUID FILLED ORAL at 17:17

## 2018-07-18 RX ADMIN — VENLAFAXINE HYDROCHLORIDE 150 MG: 150 CAPSULE, EXTENDED RELEASE ORAL at 08:15

## 2018-07-18 RX ADMIN — MAGNESIUM OXIDE TAB 400 MG (241.3 MG ELEMENTAL MG) 200 MG: 400 (241.3 MG) TAB at 02:00

## 2018-07-18 RX ADMIN — CELECOXIB 200 MG: 200 CAPSULE ORAL at 08:16

## 2018-07-18 RX ADMIN — DOCUSATE SODIUM 100 MG: 100 CAPSULE, LIQUID FILLED ORAL at 08:16

## 2018-07-18 NOTE — PROGRESS NOTES
RECREATIONAL THERAPY PARTICIPATION LOG      ACTIVITY:    GAMES:        BINGO:        MUSIC STIM:        ARTS & CRAFTS:        EXERCISE:        CLUBS & MEETING:        SOCIALS:        SPIRITUAL:        INDEPENDENT:        1:1:  Resident was greeted by Recreational Therapy Staff member for an introduction to our Program   Resident was being visited by a family member during this time  Resident and family member explained that she is not very social   Resident would prefer not to participate  Resident's Right will be respected  Independent Recreational Therapy supplies were offered; however, resident chose to decline  Resident was invited to increase her self-esteem and enhance her leisure health and well-being  BECKI Jones  C

## 2018-07-18 NOTE — PLAN OF CARE
Problem: OCCUPATIONAL THERAPY ADULT  Goal: Performs self-care activities at highest level of function for planned discharge setting  See evaluation for individualized goals  Treatment Interventions: ADL retraining, Functional transfer training, Endurance training, Patient/family training, Equipment evaluation/education, Compensatory technique education, Continued evaluation, Energy conservation, Activityengagement          See flowsheet documentation for full assessment, interventions and recommendations  Outcome: Progressing  Limitation: Decreased ADL status, Decreased endurance, Decreased high-level ADLs, Decreased self-care trans  Prognosis: Good  Assessment: Patient seen this date with a review of all therapy goals set by OTR  Patient presently achieved all goals with home making modified to dusting/bed making and straightening up of items (no laundry) with good tolerance and adherance to THP-- TAYLOR did educate further on occurances to be mindful of as she returns home to her routines with THP  Patient accepting of education  Patient ordered her equipment from [address] and therefore will have all equipment needed upon d/c  Patient plans for d/c home 7/19/18  At end of session patient returned to room with ice to hip and all needs within reach  OT Discharge Recommendation: Home OT  OT - OK to Discharge:  Yes

## 2018-07-18 NOTE — PHYSICAL THERAPY NOTE
07/18/18 0929   Incision 07/10/18 Hip Right   Date First Assessed/Time First Assessed: 07/10/18 7911   Location: Hip  Wound Location Orientation: Right  Wound Description (Comments): PRINEO DRESSING, 4X4,LONG TEGADERM, PILLOW BETWEEN LEGS   Incision Description Intact;Dry   Cassidy-wound Assessment Dry; Intact   Drainage Amount None   Dressing Dry dressing   Dressing Status Clean;Dry   Precautions   Total Hip Replacement (hip precautions)   Restrictions/Precautions   Weight Bearing Precautions Per Order No   RLE Weight Bearing Per Order WBAT   General   Chart Reviewed Yes   Family/Caregiver Present No   Cognition   Overall Cognitive Status WFL   Attention Within functional limits   Transfers   Sit to Stand 7  Independent   Stand to Sit 7  Independent   Stand pivot 7  Independent   Ambulation/Elevation   Gait Assistance 6  Modified independent   Assistive Device Rolling walker   Distance (55' 277' 627')   Stair Management Assistance 6  Modified independent   Additional items (Eleonora in S env)   Stair Management Technique One rail R   Number of Stairs (12)   Ramp Technique Forward   Ramp Assistance 6  Modified independent   Additional items (with RW)   Curbs (Amb up/down 2 curb steps with RW Mod I in S env)   Balance   Higher level balance (Amb on/off elevator RW Mod I )   Activity Tolerance   Activity Tolerance Patient tolerated treatment well   Exercises   Hip Flexion (L 20 1# standing 20 )   Hip Abduction (20 blue band )   Hip Extension Standing;20 reps   Knee Flexion Stretch Standing;20 reps   Knee AROM Long Arc Quad (L 20 2# R 20)   Ankle Pumps Standing;20 reps   Assessment   Prognosis Excellent   Assessment Misa flight of steps & curb step very well Mod I S env  Amb on/off elevator & ramp misa well    Goals   Patient Goals (return home)   STG Expiration Date 07/24/18   LTG Expiration Date 07/24/18   Treatment Day 4   Plan   Treatment/Interventions Elevations; Therapeutic exercise;Gait training   Progress Progressing toward goals   PT Frequency Twice a day   Recommendation   Equipment Recommended Walker   PT - OK to Discharge Yes                                                                                     PHYSICAL THERAPY NOTE          Patient Name: Lakeisha Brown Da Re  SEEMA'N Date: 7/18/2018   Rx time 48 min  Lizzy Knowles, PTA

## 2018-07-18 NOTE — PLAN OF CARE
Problem: PHYSICAL THERAPY ADULT  Goal: Performs mobility at highest level of function for planned discharge setting  See evaluation for individualized goals  Treatment/Interventions: ADL retraining, Functional transfer training, Elevations, Therapeutic exercise, Endurance training, Patient/family training, Equipment eval/education, Bed mobility, Gait training, Spoke to nursing          See flowsheet documentation for full assessment, interventions and recommendations  Outcome: Progressing  Prognosis: Excellent  Problem List: Decreased strength, Decreased range of motion, Decreased endurance, Impaired balance, Decreased mobility, Decreased coordination, Impaired vision, Decreased skin integrity, Orthopedic restrictions, Pain  Assessment: Faster paced amb this afternoon  Barriers to Discharge:  (Environment deficits, lives alone, decreased social support)  Barriers to Discharge Comments:  (Lives alone, environmental deficits)  Recommendation:  (Home PT to maximize safe mobility within her personal envt)     PT - OK to Discharge: Yes    See flowsheet documentation for full assessment

## 2018-07-18 NOTE — OCCUPATIONAL THERAPY NOTE
Occupational Therapy Treatment Note    Name:  Sherman Rubi   MRN:   [id number]  Age:     59 y o  Patient Active Problem List   Diagnosis    Essential hypertension    Cervical radiculopathy    Irritable bowel syndrome    Nontoxic single thyroid nodule    Vitamin D deficiency    Vitamin B12 deficiency    Menopausal symptoms    Anxiety    Primary localized osteoarthritis of pelvic region and thigh    Hx of total hip arthroplasty, right    Chronic back pain    Leukocytosis    Class 1 obesity in adult     Hx of total hip arthroplasty, right [Z96 [id number]]      Subjective/Goals:  "I think I'm good for home"    OT total treatment time: (104-1102) 54 minutes    Additional goals & Comments:  · Patient has equipment: commode, tub bench, grab bars and NBQC  · Patient ordered:  Leg , reacher and long handled sponge and plans to order sock aide and walker tray this afternoon from [employer] for home  · Patient states that she uses only the stove and microwave at home typically NOT oven therefore TAYLOR educated patient verbally THP follow thorough if she decides to use oven  · Patient reports that she has a slight fear of the steps to basement for laundry and therefore reports girlfriend to assist with laundry upon d/c  TAYLOR did educate patient on caution with laundry and follow of THP  With TAYLOR educating on home therapy to further assess-- patient agreeable  07/18/18 1102   Restrictions/Precautions   Weight Bearing Precautions Per Order No   RLE Weight Bearing Per Order WBAT   Pain Assessment   Pain Assessment 0-10   Pain Score 4   Pain Type Surgical pain   Pain Location Hip   Pain Orientation Right   Pain Descriptors Aching   Pain Frequency Constant/continuous   Pain Onset Ongoing   Clinical Progression Gradually improving   Patient's Stated Pain Goal No pain   Hospital Pain Intervention(s) Medication (See MAR); Cold applied   ADL   Where Assessed Chair   Equipment Provided Reacher;Sock aid;Long-handled shoe horn   Grooming Assistance 7  Independent   Grooming Comments sinkside   UB Bathing Assistance 7  Independent   LB Bathing Assistance 6  Modified Independent   LB Bathing Comments patient obtained a long handled sponge for home   UB Dressing Assistance 7  Independent   LB Dressing Assistance 6  Modified independent   LB Dressing Comments patient using AE with good follow of THP-- patient reports plan to order sock aide for home given unit presently out of equipment   Toileting Assistance  6  Modified independent   Light Housekeeping   Light Housekeeping Level (bedmaking)   Light Housekeeping Level of Assistance Modified independent   Light Housekeeping overall good tolerance and adherance to THP-- patient education on areas of caution to further prevent risk of breaking precautions  Patient also reports that she will be having a girlfriend assist with laundry upon d/c stating she is nervous with steps    TAYLOR encouraged patient to have home therapy assess laundry- patient agreeable   Kitchen Mobility   Kitchen-Mobility Level Walker   Kitchen Activity Retrieve items;Transport items   Kitchen Mobility Comments MI-- patient to obtain walker tray   Functional Standing Tolerance   Time 10+ minutes    Activity ADL, fxnl mobility, light HM   Comments no LOB, good tolerance   Bed Mobility   Supine to Sit 6  Modified independent   Additional items (leg )   Sit to Supine 6  Modified independent   Additional items (leg )   Transfers   Sit to Stand 7  Independent   Stand to Sit 7  Independent   Stand pivot 6  Modified independent   Toilet transfer 6  Modified independent   Additional items Armrests   Functional Mobility   Functional Mobility 6  Modified independent   Additional items Rolling walker   Toilet Transfers   Toilet Transfer From Rolling walker   Toilet Transfer Type To and from   Toilet Transfer to Raised toilet seat with rails   Toilet Transfer Technique Ambulating   Toilet Transfers Modified independence   Tub Transfers   Tub Transfer From [employer] Type To and From   Tub Transfer to Transfer tub bench   Tub Transfer Technique Ambulating   Tub Transfers Modified independence   Therapeutic Excerise-Strength   UE Strength (2# bilateral weight all planes 2x15, 10 min UE bike stance)   Cognition   Overall Cognitive Status [facility]   Arousal/Participation Alert; Responsive;Arousable; Cooperative   Attention Within functional limits   Orientation Level Oriented X4   Memory Within functional limits   Following Commands Follows all commands and directions without difficulty   Comments (Good THP recall 3/3 with good follow thorugh with activity)   Additional Activities   Additional Activities Comments Balance:  Static Good, Dynamic fair+/good-   Activity Tolerance   Activity Tolerance Patient tolerated treatment well  (Good-)   Assessment   Assessment Patient seen this date with a review of all therapy goals set by OTR  Patient presently achieved all goals with home making modified to dusting/bed making and straightening up of items (no laundry) with good tolerance and adherance to THP-- TAYLOR did educate further on occurances to be mindful of as she returns home to her routines with THP  Patient accepting of education  Patient ordered her equipment from [facility] and therefore will have all equipment needed upon d/c  Patient plans for d/c home 7/19/18  At end of session patient returned to room with ice to hip and all needs within reach  Plan   Treatment Interventions ADL retraining;Functional transfer training;UE strengthening/ROM; Activityengagement; Energy conservation   Goal Expiration Date 07/22/18   Treatment Day 4   OT Frequency (6x/week)   Recommendation   OT Discharge Recommendation Home OT   OT - OK to Discharge Yes   Barthel Index   Feeding 10   Bathing 5   Grooming Score 5   Dressing Score 10   Bladder Score 10   Bowels Score 10   Toilet Use Score 10   Transfers (Bed/Chair) Score 15 Mobility (Level Surface) Score 15   Stairs Score 10   Barthel Index Score 100     STG=LTG  Pt will achieve the following goals within 1 weeks     *G&H standing sinkside with (I)} and with Good balance for inc'd independence with self cares       *ADL transfers with mod (I) for inc'd independence with ADLs/purposeful tasks         *LB ADL at mod (I) level using AE prn for inc'd independence with self cares        *Toileting with mod (I) for clothing management and hygiene for return to PLOF with personal care     *Increase static stand balance to Good and dyn stand balance to Fair+/Good- for inc'd safety with standing purposeful tasks       *Increase stand tolerance x7-10m m for inc'd tolerance with standing purposeful tasks ~ 10 MIN     *Activity tolerance- Good- for inc'd tolerance with purposeful tasks  ~ G-     *Participate in 10m UE therex to increase overall stamina/activity tolerance for purposeful tasks       *Tub/shower transfer using Tub seat/transfer bench with mod (I) for inc'd safety and independence with bathing        *Kitchen mobility- mod (I) for inc'd independence and safety negotiating kitchen environment       *Light meal prep- mod (I) to increase independence with preparing self nourishment     *Bed mobility- mod (I) for inc'd independence to manage own comfort and initiate EOB & OOB purposeful tasks       *Pt will carry out light homemaking tasks (laundry, bed making, etc) mod (I) with good safety **reports no laundry upon d/c**     * Pt will safely carry out ADLs as well as ADL transfers without cues to maintain THR precautions 100% of the time       *Pt will recall 3/3 hip precautions for inc'd safety with all purposeful tasks      All goals achieved with d/c planned for 7/19/18    Pastora Garcia  7/18/2018

## 2018-07-18 NOTE — PLAN OF CARE
Problem: PHYSICAL THERAPY ADULT  Goal: Performs mobility at highest level of function for planned discharge setting  See evaluation for individualized goals  Treatment/Interventions: ADL retraining, Functional transfer training, Elevations, Therapeutic exercise, Endurance training, Patient/family training, Equipment eval/education, Bed mobility, Gait training, Spoke to nursing          See flowsheet documentation for full assessment, interventions and recommendations  Outcome: Progressing  Prognosis: Excellent  Problem List: Decreased strength, Decreased range of motion, Decreased endurance, Impaired balance, Decreased mobility, Decreased coordination, Impaired vision, Decreased skin integrity, Orthopedic restrictions, Pain  Assessment: Misa flight of steps & curb step very well Mod I S env  Amb on/off elevator & ramp misa well   Barriers to Discharge:  (Environment deficits, lives alone, decreased social support)  Barriers to Discharge Comments:  (Lives alone, environmental deficits)  Recommendation:  (Home PT to maximize safe mobility within her personal envt)     PT - OK to Discharge: Yes    See flowsheet documentation for full assessment

## 2018-07-18 NOTE — PHYSICAL THERAPY NOTE
07/18/18 1352   Pain Assessment   Pain Assessment 0-10   Pain Location Hip   Pain Orientation Right   Precautions   Total Hip Replacement (hip precautions)   Restrictions/Precautions   Weight Bearing Precautions Per Order No   RLE Weight Bearing Per Order WBAT   General   Chart Reviewed Yes   Family/Caregiver Present No   Bed Mobility   Supine to Sit 6  Modified independent   Additional items (with leg  )   Sit to Supine 6  Modified independent   Transfers   Sit to Stand 7  Independent   Stand to Sit 7  Independent   Stand pivot 7  Independent   Ambulation/Elevation   Gait Assistance 6  Modified independent   Additional items (faster pace)   Assistive Device Rolling walker   Activity Tolerance   Activity Tolerance Patient tolerated treatment well   Exercises   Quad Sets Supine;20 reps   Heelslides Supine;20 reps   Glute Sets Supine;20 reps   Hip Flexion (SLR 20 with leg  )   Hip Abduction Supine;20 reps   Knee AROM Short Arc Quad Supine;20 reps   Bridging Supine;20 reps   Neuro re-ed Also performe P-ball press 20 ABD with band 20    Assessment   Prognosis Excellent   Assessment Faster paced amb this afternoon   Goals   STG Expiration Date 07/24/18   LTG Expiration Date 07/24/18   Treatment Day 4   Plan   Treatment/Interventions Elevations; Therapeutic exercise;Gait training   PT Frequency Twice a day   Recommendation   Equipment Recommended Walker   PT - OK to Discharge Yes                                                                                     PHYSICAL THERAPY NOTE          Patient Name: Dot Lim Michael Re  [id number] Date: 7/18/2018   Rx time 37 min  Wayne Lizama PTA

## 2018-07-19 VITALS
OXYGEN SATURATION: 99 % | TEMPERATURE: 97.2 F | DIASTOLIC BLOOD PRESSURE: 68 MMHG | HEART RATE: 70 BPM | RESPIRATION RATE: 19 BRPM | SYSTOLIC BLOOD PRESSURE: 125 MMHG | HEIGHT: 62 IN | BODY MASS INDEX: 36.47 KG/M2 | WEIGHT: 198.19 LBS

## 2018-07-19 PROBLEM — D72.829 LEUKOCYTOSIS: Status: RESOLVED | Noted: 2018-07-13 | Resolved: 2018-07-19

## 2018-07-19 PROCEDURE — 99315 NF DSCHRG MGMT 30 MIN/LESS: CPT | Performed by: NURSE PRACTITIONER

## 2018-07-19 PROCEDURE — 97116 GAIT TRAINING THERAPY: CPT

## 2018-07-19 PROCEDURE — 97530 THERAPEUTIC ACTIVITIES: CPT

## 2018-07-19 RX ORDER — SENNOSIDES 8.6 MG
1 TABLET ORAL 2 TIMES DAILY PRN
Qty: 60 EACH | Refills: 0 | Status: SHIPPED | OUTPATIENT
Start: 2018-07-19 | End: 2019-07-26 | Stop reason: ALTCHOICE

## 2018-07-19 RX ORDER — CELECOXIB 200 MG/1
200 CAPSULE ORAL DAILY
Qty: 30 CAPSULE | Refills: 0 | Status: SHIPPED | OUTPATIENT
Start: 2018-07-19 | End: 2019-07-26 | Stop reason: ALTCHOICE

## 2018-07-19 RX ORDER — TRAMADOL HYDROCHLORIDE 50 MG/1
50 TABLET ORAL EVERY 6 HOURS PRN
Qty: 30 TABLET | Refills: 0 | Status: SHIPPED | OUTPATIENT
Start: 2018-07-19 | End: 2018-07-29

## 2018-07-19 RX ORDER — ASPIRIN 81 MG/1
81 TABLET, CHEWABLE ORAL 2 TIMES DAILY
Qty: 60 TABLET | Refills: 0 | Status: SHIPPED | OUTPATIENT
Start: 2018-07-19 | End: 2019-07-26 | Stop reason: ALTCHOICE

## 2018-07-19 RX ORDER — DOCUSATE SODIUM 100 MG/1
100 CAPSULE, LIQUID FILLED ORAL 2 TIMES DAILY PRN
Qty: 60 CAPSULE | Refills: 0 | Status: SHIPPED | OUTPATIENT
Start: 2018-07-19 | End: 2019-07-26 | Stop reason: ALTCHOICE

## 2018-07-19 RX ADMIN — TRAMADOL HYDROCHLORIDE 50 MG: 50 TABLET, COATED ORAL at 14:55

## 2018-07-19 RX ADMIN — CELECOXIB 200 MG: 200 CAPSULE ORAL at 08:39

## 2018-07-19 RX ADMIN — VENLAFAXINE HYDROCHLORIDE 150 MG: 150 CAPSULE, EXTENDED RELEASE ORAL at 08:17

## 2018-07-19 RX ADMIN — VITAMIN D, TAB 1000IU (100/BT) 4000 UNITS: 25 TAB at 08:15

## 2018-07-19 RX ADMIN — SENNOSIDES 8.6 MG: 8.6 TABLET, FILM COATED ORAL at 08:40

## 2018-07-19 RX ADMIN — ASPIRIN 81 MG 81 MG: 81 TABLET ORAL at 08:17

## 2018-07-19 RX ADMIN — B-COMPLEX W/ C & FOLIC ACID TAB 1 TABLET: TAB at 08:16

## 2018-07-19 RX ADMIN — ASPIRIN 81 MG 81 MG: 81 TABLET ORAL at 17:11

## 2018-07-19 RX ADMIN — MAGNESIUM OXIDE TAB 400 MG (241.3 MG ELEMENTAL MG) 200 MG: 400 (241.3 MG) TAB at 08:15

## 2018-07-19 RX ADMIN — DOCUSATE SODIUM 100 MG: 100 CAPSULE, LIQUID FILLED ORAL at 17:11

## 2018-07-19 RX ADMIN — DOCUSATE SODIUM 100 MG: 100 CAPSULE, LIQUID FILLED ORAL at 08:40

## 2018-07-19 RX ADMIN — Medication 1 TABLET: at 08:15

## 2018-07-19 RX ADMIN — VALSARTAN 80 MG: 40 TABLET, FILM COATED ORAL at 08:16

## 2018-07-19 NOTE — PLAN OF CARE
Problem: OCCUPATIONAL THERAPY ADULT  Goal: Performs self-care activities at highest level of function for planned discharge setting  See evaluation for individualized goals  Treatment Interventions: ADL retraining, Functional transfer training, Endurance training, Patient/family training, Equipment evaluation/education, Compensatory technique education, Continued evaluation, Energy conservation, Activityengagement          See flowsheet documentation for full assessment, interventions and recommendations  Outcome: Completed Date Met: 07/19/18  Prognosis: Good  Assessment: Time: 10:30-10:45  Pt sitting in chair upon entry  Educated pt on OT wrapping things up for discharge home today; agreed  Reports 3/10 R hip and knee pain but declined meds  OT offered ice pack; agreed  SPO2 98% RA HR 73  Reviewed LB AE in room and pt reports the following: OT provided shoe horn but she ordered from [employer]- sock aid, walker tray, and suction cup gastelum for hand held shower head  States she has all additional necessary equipment from her prior sx  Discussed toilet heights, which she has a higher toilet, but also have BSC that she will place over the top  OT inquired about home services and pt wasn't aware of anything  OT spoke with , whom states pt will have [facility]  OT informed pt and discussed process  Pt agreed  Reviewed all goals as per POC and achievement of all goals  Pt states she feels confident in returning home and being able to manage  No further concerns noted  Remains in chair with ice intact  Discharged from OT  OT Discharge Recommendation: Home OT  OT - OK to Discharge: Yes    OCCUPATIONAL THERAPY DISCHARGE SUMMARY    DISPOSITION: Home today  AE/DME: RW & Shoe horn  Pt ordered sock aid, walker tray, and suction cup gastelum for shower head from [employer]  DISCHARGE SUMMARY: Pt with scheduled discharge home today  Participated in a total of 5/5 OT sessions  Pt achieved all goals as per POC   Pt will have VNA services to ensure safety and maximize function in own environment  Remains with total hip precautions and maintains well  Son to visit from out of state tomorrow 7/20/18  Safe to return home  No further concerns noted  RECOMMENDATIONS: VNA services

## 2018-07-19 NOTE — OCCUPATIONAL THERAPY NOTE
[address] Treatment Note & Discharge Summary     Patient Name: Lady Rodriguez Re  KFCGU'C Date: 7/19/2018  Problem List  Patient Active Problem List   Diagnosis    Essential hypertension    Cervical radiculopathy    Irritable bowel syndrome    Nontoxic single thyroid nodule    Vitamin D deficiency    Vitamin B12 deficiency    Menopausal symptoms    Anxiety    Primary localized osteoarthritis of pelvic region and thigh    Hx of total hip arthroplasty, right    Chronic back pain    Leukocytosis    Class 1 obesity in adult      Subjective: "I feel good about going home"       07/19/18 1045   Restrictions/Precautions   Other Precautions (THP's)   Pain Assessment   Pain Assessment 0-10   Pain Score 3   Pain Type Surgical pain   Pain Location Hip;Knee   Pain Orientation Right   Hospital Pain Intervention(s) Cold applied; Emotional support   Response to Interventions (Improvement)   Assessment   Assessment Time: 10:30-10:45  Pt sitting in chair upon entry  Educated pt on OT wrapping things up for discharge home today; agreed  Reports 3/10 R hip and knee pain but declined meds  OT offered ice pack; agreed  SPO2 98% RA HR 73  Reviewed LB AE in room and pt reports the following: OT provided shoe horn but she ordered from [employer]- sock aid, walker tray, and suction cup gastelum for hand held shower head  States she has all additional necessary equipment from her prior sx  Discussed toilet heights, which she has a higher toilet, but also have BSC that she will place over the top  OT inquired about home services and pt wasn't aware of anything  OT spoke with SS, whom states pt will have [facility]  OT informed pt and discussed process  Pt agreed  Reviewed all goals as per POC and achievement of all goals  Pt states she feels confident in returning home and being able to manage  No further concerns noted  Remains in chair with ice intact  Discharged from OT      Recommendation   OT Discharge Recommendation Home OT   Equipment Recommended (RW)   OT - OK to Discharge Yes       OCCUPATIONAL THERAPY DISCHARGE SUMMARY    DISPOSITION: Home today  AE/DME: RW & Shoe horn  Pt ordered sock aid, walker tray, and suction cup gastelum for shower head from [employer]  DISCHARGE SUMMARY: Pt with scheduled discharge home today  Participated in a total of 5/5 OT sessions  Pt achieved all goals as per POC  Pt will have VNA services to ensure safety and maximize function in own environment  Remains with total hip precautions and maintains well  Son to visit from out of state tomorrow 7/20/18  Safe to return home  No further concerns noted  RECOMMENDATIONS: VNA services       Lavern Thomason OT

## 2018-07-19 NOTE — PROGRESS NOTES
Discharge- Phillip Rodriguez Re 1955, 59 y o  female MRN: [id number]    Unit/Bed#: -01 Encounter: [id number]    Primary Care Provider: Kamila Rodriguez DO   Date and time admitted to hospital: 7/13/2018  8:16 PM        * Hx of total hip arthroplasty, right   Assessment & Plan    Patient was transferred from [facility] after she underwent a right total hip arthroplasty by Janneth Byrd on Jul 10, 3294  She is doing well with inpatient rehab and will be going home today with [facility]  Lovenox d/c'd, as pt is on 81mg ASA BID, which is what is recommended by Dr Ruiz Granados  Per nursing, they spoke with Dr Analy Jolly PA, Gilford Purpura, who stated additional anticoagulation is not necessary  Pain management with prn tramadol and tylenol  Called Dr Analy Jolly office to obtain clarification on wound care orders for discharge  Office to fax wound care orders and RN made aware to include in d/c instructions to send with patient  RN changed dressing, but left acticoat intact  She has dsd with tegaderm in place  RN reported no s/s of infection  No redness around wound site  No drainage  Dressing is clean and dry  Minimal tenderness  No fevers or leukocytosis  Class 1 obesity in adult   Assessment & Plan    Encourage exercises and healthy diet        Chronic back pain   Assessment & Plan    Continue current pain regimen  She currently denies back pain  Irritable bowel syndrome   Assessment & Plan    Has been asymptomatic        Essential hypertension   Assessment & Plan    Patient allergic to ACE inhibitor  Stable on valsartan          Leukocytosis-resolved as of 7/19/2018   Assessment & Plan    Most likely postoperative - resolved              Discharging Physician / Practitioner: SHEFALI Pickard  PCP: Kamila Rodriguez DO  Admission Date:   Admission Orders     Ordered        07/13/18 2058  Inpatient Admission  Once             Discharge Date: 07/19/18    Resolved Problems  Date Reviewed: 7/19/2018          Resolved    Leukocytosis 7/19/2018     Resolved by  SHEFALI Jackson          Outpatient Tests Requested:  · Per PCP and Dr Sarah Bingham    Complications:  Uncomplicated course    Reason for Admission: deconditioning    Hospital Course:     Alberto Rubi is a 59 y o  female patient who originally presented to the hospital on 7/13/2018 due to deconditioning after having right total hip arthroplasty by Dr Sarah Bingham on July 10, 6447  She received maximum benefit from inpatient rehab and will be discharged home with services, and f/u with PCP and Dr Sarah Bingham  Please see above list of diagnoses and related plan for additional information  Condition at Discharge: good     Discharge Day Visit / Exam:     Subjective:  Pt denies all complaints  No CP, SOB, N/V/D, constipation, dysuria, dizziness, or light headedness  Denies pain at this time  Controlled on current regimen  Vitals: Blood Pressure: 119/68 (07/19/18 0700)  Pulse: 71 (07/19/18 0700)  Temperature: (!) 96 7 °F (35 9 °C) (07/19/18 0700)  Temp Source: Temporal (07/19/18 0700)  Respirations: 22 (07/19/18 0700)  Height: 5' 2" (157 5 cm) (07/16/18 1309)  Weight - Scale: 89 9 kg (198 lb 3 1 oz) (07/16/18 0600)  SpO2: 99 % (07/19/18 0700)  Exam:   Physical Exam   Constitutional: She is oriented to person, place, and time  She appears well-developed and well-nourished  No distress  HENT:   Head: Normocephalic and atraumatic  Eyes: Right eye exhibits no discharge  Left eye exhibits no discharge  Neck: No JVD present  Cardiovascular: Normal rate, regular rhythm, normal heart sounds and intact distal pulses  Exam reveals no gallop and no friction rub  No murmur heard  Pulmonary/Chest: Effort normal and breath sounds normal  No respiratory distress  She has no wheezes  She has no rales  She exhibits no tenderness  Abdominal: Soft  Bowel sounds are normal  She exhibits no distension  There is no tenderness   There is no rebound and no guarding  Musculoskeletal: She exhibits no edema  Neurological: She is alert and oriented to person, place, and time  Skin: Skin is warm and dry  She is not diaphoretic  No erythema  Right  Hip dressing clean and dry  No erythema or drainage  Psychiatric: She has a normal mood and affect  Discussion with Family: Plan of care discussed with patient  Discharge instructions/Information to patient and family:   See after visit summary for information provided to patient and family  Provisions for Follow-Up Care:  See after visit summary for information related to follow-up care and any pertinent home health orders  Disposition:     Home with VNA Services (Reminder: Complete face to face encounter)    For Discharges to [facility]F:   · Not Applicable to this Patient - Not Applicable to this Patient    Planned Readmission: no     Discharge Statement:  I spent 30 minutes discharging the patient  This time was spent on the day of discharge  I had direct contact with the patient on the day of discharge  Greater than 50% of the total time was spent examining patient, answering all patient questions, arranging and discussing plan of care with patient as well as directly providing post-discharge instructions  Additional time then spent on discharge activities  Discharge Medications:  See after visit summary for reconciled discharge medications provided to patient and family

## 2018-07-19 NOTE — DISCHARGE INSTRUCTIONS
Abrasion   WHAT YOU NEED TO KNOW:   An abrasion is a scrape on your skin  It happens when your skin rubs against a rough surface  Some examples of an abrasion include rug burn, a skinned elbow, or road rash  Abrasions can be many shapes and sizes  The wound may hurt, bleed, bruise, or swell  DISCHARGE INSTRUCTIONS:   Return to the emergency department if:   · The bleeding does not stop after 10 minutes of firm pressure  · You cannot rinse one or more foreign objects out of your wound  · You have red streaks on your skin coming from your wound  Contact your healthcare provider if:   · You have a fever or chills  · Your abrasion is red, warm, swollen, or draining pus  · You have questions or concerns about your condition or care  Care for your abrasion:   · Wash your hands and dry them with a clean towel  · Press a clean cloth against your wound to stop any bleeding  · Rinse your wound with a lot of clean water  Do not use harsh soap, alcohol, or iodine solutions  · Use a clean, wet cloth to remove any objects, such as small pieces of rocks or dirt  · Rub antibiotic ointment on your wound  This may help prevent infection and help your wound heal     · Cover the wound with a non-stick bandage  Change the bandage daily, and if gets wet or dirty  Follow up with your healthcare provider as directed:  Write down your questions so you remember to ask them during your visits  © 2017 [address] [employer] Information is for End User's use only and may not be sold, redistributed or otherwise used for commercial purposes  All illustrations and images included in CareNotes® are the copyrighted property of [employer] , [employer]  or Leonel Benavides  The above information is an  only  It is not intended as medical advice for individual conditions or treatments   Talk to your doctor, nurse or pharmacist before following any medical regimen to see if it is safe and effective for you   Wound Care - Per Dr Lola Espino    Hypertension   WHAT YOU NEED TO KNOW:   What is hypertension? Hypertension is high blood pressure (BP)  Your BP is the force of your blood moving against the walls of your arteries  Normal BP is less than 120/80  Prehypertension is between 120/80 and 139/89  Hypertension is 140/90 or higher  Hypertension causes your BP to get so high that your heart has to work much harder than normal  This can damage your heart  You can control hypertension with a healthy lifestyle or medicines  A controlled blood pressure helps protect your organs, such as your heart, lungs, brain, and kidneys  What causes hypertension? The cause of hypertension may not be known  This type of hypertension is called essential or primary hypertension  Hypertension can sometimes be caused by other medical conditions, such as kidney disease  This type of hypertension is called secondary hypertension  What increases my risk for hypertension? Age older than 54 years (men)    Age older than 72 years (women)    A family history of hypertension or heart disease     Obesity or lack of exercise     Too many high-sodium foods    Stress     Use of tobacco, alcohol, or illegal drugs     A medical condition, such as diabetes, kidney disease, thyroid disease, or adrenal gland disorder     Certain medicines, such as steroids or birth control pills  What are the signs and symptoms of hypertension? You may have no signs or symptoms, or you may have any of the following:  Headache     Blurred vision     Chest pain     Dizziness or weakness     Trouble breathing    Nosebleeds  How is hypertension diagnosed? Your healthcare provider will ask about your symptoms and the medicines you take  He or she will also ask if you have a family history of high blood pressure and about any health conditions you have  He or she will also check your blood pressure and weight and examine your heart, lungs, and eyes   You may need any of the following tests:  Blood tests  may help healthcare providers find the cause of your hypertension  Blood tests can also help find other health problems caused by hypertension  Urine tests  will be done to check your kidney function  Kidney problems can increase your risk for hypertension  How is hypertension treated? Your healthcare provider will recommend lifestyle changes to lower your BP  You may also need the following medicines:  Medicine  may be used to help lower your BP  You may need more than one type of medicine  Take the medicine exactly as directed  Diuretics  help decrease extra fluid that collects in your body  This will help lower your BP  You may urinate more often while you take this medicine  Cholesterol medicine  helps lower your cholesterol level  A low cholesterol level helps prevent heart disease and makes it easier to control your blood pressure  What can I do to manage hypertension? Talk with your healthcare provider about these and other ways to manage hypertension:  Check your BP at home  Sit and rest for 5 minutes before you take your BP  Extend your arm and support it on a flat surface  Your arm should be at the same level as your heart  Follow the directions that came with your BP monitor  If possible, take at least 2 BP readings each time  Take your BP at least twice a day at the same times each day, such as morning and evening  Keep a record of your BP readings and bring it to your follow-up visits  Ask your healthcare provider what your BP should be  Limit sodium (salt) as directed  Too much sodium can affect your fluid balance  Check labels to find low-sodium or no-salt-added foods  Some low-sodium foods use potassium salts for flavor  Too much potassium can also cause health problems  Your healthcare provider will tell you how much sodium and potassium are safe for you to have in a day   He or she may recommend that you limit sodium to 2,300 mg a day            Follow the meal plan recommended by your healthcare provider  A dietitian or your provider can give you more information on low-sodium plans or the DASH (Dietary Approaches to Stop Hypertension) eating plan  The DASH plan is low in sodium, unhealthy fats, and total fat  It is high in potassium, calcium, and fiber  Exercise to maintain a healthy weight  Exercise at least 30 minutes per day, on most days of the week  This will help decrease your blood pressure  Ask your healthcare provider about the best exercise plan for you  Decrease stress  This may help lower your BP  Learn ways to relax, such as deep breathing or listening to music  Limit alcohol  Women should limit alcohol to 1 drink a day  Men should limit alcohol to 2 drinks a day  A drink of alcohol is 12 ounces of beer, 5 ounces of wine, or 1½ ounces of liquor  Do not smoke  Nicotine and other chemicals in cigarettes and cigars can increase your BP and also cause lung damage  Ask your healthcare provider for information if you currently smoke and need help to quit  E-cigarettes or smokeless tobacco still contain nicotine  Talk to your healthcare provider before you use these products  Manage any other health conditions you have  Health conditions such as diabetes can increase your risk for hypertension  Follow your healthcare provider's instructions and take all your medicines as directed  Call 911 for any of the following: You have discomfort in your chest that feels like squeezing, pressure, fullness, or pain  You become confused or have difficulty speaking  You suddenly feel lightheaded or have trouble breathing  You have pain or discomfort in your back, neck, jaw, stomach, or arm  When should I seek immediate care? You have a severe headache or vision loss  You have weakness in an arm or leg  When should I contact my healthcare provider? You feel faint, dizzy, confused, or drowsy      You have been taking your BP medicine and your BP is still higher than your healthcare provider says it should be  You have questions or concerns about your condition or care  CARE AGREEMENT:   You have the right to help plan your care  Learn about your health condition and how it may be treated  Discuss treatment options with your caregivers to decide what care you want to receive  You always have the right to refuse treatment  The above information is an  only  It is not intended as medical advice for individual conditions or treatments  Talk to your doctor, nurse or pharmacist before following any medical regimen to see if it is safe and effective for you  © 2017 [address] [employer] Information is for End User's use only and may not be sold, redistributed or otherwise used for commercial purposes  All illustrations and images included in CareNotes® are the copyrighted property of A D A M [employer]  or Leonel Benavides  Joint Replacement Surgery   WHAT YOU NEED TO KNOW:   A joint that is damaged by injury or disease can be removed and replaced with a new one  There are times when only a part of the joint needs to be replaced or repaired  Your healthcare provider may try other treatments before joint replacement surgery, such as steroid injections or medicines  Pain relief and increased function are the goals of joint replacement  Knee, hip, and shoulder joints are the most common joints replaced  Joints in your elbows, fingers, and ankles can also be repaired or replaced  DISCHARGE INSTRUCTIONS:   Call 911 if:   · You have a seizure  Seek immediate care if:   · You have chest pain when you take a deep breath or cough  You may cough up blood  · You suddenly feel lightheaded and short of breath  · You feel like you are going to faint  · Blood soaks through your bandage  · Your incision comes apart  · Your incision is red, swollen, or draining pus      · You cannot walk or move your joint, or the limb is numb  · Your arm or leg feels warm, tender, and painful  Contact your healthcare provider if:   · You have a fever over 101 5°F     · You have trouble moving or bending your joint  · You have increased pain and swelling in your joint, even after you take pain medicine  · You have back pain or lower leg pain when you bend your foot upwards  · You have questions or concerns about your condition or care  Medicines:   · Pain medicine  may be given to take away or decrease pain  Do not wait until the pain is severe before you take your medicine  Pain medicine can make you dizzy or sleepy  Prevent falls by calling someone when you get out of bed or if you need help  · Antibiotics  may be given to decrease the risk of a bacterial infection  · Anticoagulants  are a type of blood thinner medicine that helps prevent blood clots  Clots can cause strokes, heart attacks, and death  These medicines may cause you to bleed or bruise more easily  ¨ Watch for bleeding from your gums or nose  Watch for blood in your urine and bowel movements  Use a soft washcloth and a soft toothbrush  If you shave, use an electric razor  Avoid activities that can cause bruising or bleeding  ¨ Tell your healthcare provider about all medicines you take because many medicines cannot be used with anticoagulants  Do not start or stop any medicines unless your healthcare provider tells you to  Tell your dentist and other healthcare providers that you take anticoagulants  Wear a bracelet or necklace that says you take this medicine  ¨ You will need regular blood tests so your healthcare provider can decide how much medicine you need  Take anticoagulants exactly as directed  Tell your healthcare provider right away if you forget to take the medicine, or if you take too much  ¨ If you take warfarin, some foods can change how your blood clots   Do not make major changes to your diet while you take warfarin  Warfarin works best when you eat about the same amount of vitamin K every day  Vitamin K is found in green leafy vegetables, broccoli, grapes, and other foods  Ask for more information about what to eat when you take warfarin  · Take your medicine as directed  Contact your healthcare provider if you think your medicine is not helping or if you have side effects  Tell him or her if you are allergic to any medicine  Keep a list of the medicines, vitamins, and herbs you take  Include the amounts, and when and why you take them  Bring the list or the pill bottles to follow-up visits  Carry your medicine list with you in case of an emergency  Follow up with your healthcare provider as directed: You may need to return to have your wound checked and stitches, staples, or drain removed  Write down your questions so you remember to ask them during your visits  Self-care:   · Know your limits  It may be harmful to completely straighten or bend your joint  You may not be able to put pressure on your joint  You may not be able to drive  Ask your healthcare providers about your limits  · Do exercises as directed  Ask your healthcare provider what exercises would be safe for you  Daily stretching and exercise will increase circulation and decrease your risk for blood clots  · Care for your wound as directed  Ask how and when to change your bandage and clean your wound  · Use ice as directed  Ice helps decrease swelling and pain  Ice may also help prevent tissue damage  Use an ice pack or put crushed ice in a plastic bag  Cover it with a towel, and place it on the surgery area for 15 to 20 minutes every hour as directed  · Wear pressure stockings  These are long, tight stockings that put pressure on your legs to promote blood flow and prevent clots  · Use support devices  A cane, walker, crutches, or raised toilet seat will help decrease your risk of falling   A brace, sling, or splint may be used to help support the area where you had surgery  · Remove hazards in your home  Remove throw rugs and objects that can increase your risk of falling  · Tell all healthcare providers about your joint replacement surgery  You may need to have antibiotics before any procedure to prevent an infection of your new joint  Carry a card that says that you have metal in your joint  The metal in your new joint may set off metal detectors  You will not be able to have an MRI with metal in your joint  Physical therapy:  A physical therapist teaches you exercises to help improve movement and strength, and to decrease pain  © 2017 [address] [employer]  Information is for End User's use only and may not be sold, redistributed or otherwise used for commercial purposes  All illustrations and images included in CareNotes® are the copyrighted property of A [employer] M [employer]  or Leonel Benavides  The above information is an  only  It is not intended as medical advice for individual conditions or treatments  Talk to your doctor, nurse or pharmacist before following any medical regimen to see if it is safe and effective for you

## 2018-07-19 NOTE — ASSESSMENT & PLAN NOTE
Patient was transferred from [facility] after she underwent a right total hip arthroplasty by Dr Duarte on Jul 10, 0615  She is doing well with inpatient rehab and will be going home today with [facility]  Lovenox d/c'd, as pt is on 81mg ASA BID, which is what is recommended by Dr Seth Kitchen  Per nursing, they spoke with Dr Theodore LALA, Nino Brink, who stated additional anticoagulation is not necessary  Pain management with prn tramadol and tylenol  Called Dr Theodore Bender office to obtain clarification on wound care orders for discharge  Office to fax wound care orders and RN made aware to include in d/c instructions to send with patient  RN changed dressing, but left acticoat intact  She has dsd with tegaderm in place  RN reported no s/s of infection  No redness around wound site  No drainage  Dressing is clean and dry  Minimal tenderness  No fevers or leukocytosis

## 2018-07-19 NOTE — PLAN OF CARE
Problem: PHYSICAL THERAPY ADULT  Goal: Performs mobility at highest level of function for planned discharge setting  See evaluation for individualized goals  Treatment/Interventions: ADL retraining, Functional transfer training, Elevations, Therapeutic exercise, Endurance training, Patient/family training, Equipment eval/education, Bed mobility, Gait training, Spoke to nursing          See flowsheet documentation for full assessment, interventions and recommendations  Outcome: Adequate for Discharge  Prognosis: Excellent  Problem List: Decreased strength, Decreased range of motion, Decreased endurance, Impaired balance, Decreased mobility, Impaired vision, Decreased skin integrity, Orthopedic restrictions, Pain  Assessment: Pt is discharged from skilled PT effective today, 7/19/18  Pt with plans to return to home alone this afternoon  Pt's son with plans to come up from [place] tomorrow, to visit with pt   Since 7/14/18 PT Eval, pt has been seen for 5/5 skilled PT tx days for therex, theract, and gait /elevation training  Pt with very good progress in skilled PT, this past week  Improvement assessed with: functional strength, activity tolerance, sit and stand balance, sit <->supine transfers, sit <->stand transfers, SPT's with RW, and gait/elevations  All STG's/LTG's are now achieved  Barriers to Discharge: Decreased caregiver support  Barriers to Discharge Comments:  (Outdoor and indoor elevations)  Recommendation:  (Home PT to maximize safe mobility within her personal envt)     PT - OK to Discharge: Yes    See flowsheet documentation for full assessment

## 2018-07-19 NOTE — DISCHARGE SUMMARY
Discharge- Marlana Kussmaul Da Re 1955, 59 y o  female MRN: [id number]     Unit/Bed#: -01 Encounter: [id number]     Primary Care Provider: Joe Estevez DO   Date and time admitted to hospital: 7/13/2018  8:16 PM               * Hx of total hip arthroplasty, right   Assessment & Plan     Patient was transferred from [facility] after she underwent a right total hip arthroplasty by [facility] on Jul 10, 7339  She is doing well with inpatient rehab and will be going home today with [facility]  Lovenox d/c'd, as pt is on 81mg ASA BID, which is what is recommended by Dr Lisa Wilde  Per nursing, they spoke with Dr Rekha Abraham PA, [facility], who stated additional anticoagulation is not necessary  Pain management with prn tramadol and tylenol  Called Dr Rekha Abraham office to obtain clarification on wound care orders for discharge  Office to fax wound care orders and RN made aware to include in d/c instructions to send with patient  RN changed dressing, but left acticoat intact  She has dsd with tegaderm in place  RN reported no s/s of infection  No redness around wound site  No drainage  Dressing is clean and dry  Minimal tenderness  No fevers or leukocytosis                Class 1 obesity in adult   Assessment & Plan     Encourage exercises and healthy diet          Chronic back pain   Assessment & Plan     Continue current pain regimen  She currently denies back pain           Irritable bowel syndrome   Assessment & Plan     Has been asymptomatic          Essential hypertension   Assessment & Plan     Patient allergic to ACE inhibitor    Stable on valsartan           Leukocytosis-resolved as of 7/19/2018   Assessment & Plan     Most likely postoperative - resolved                   Discharging Physician / Practitioner: Tiana Nath  PCP: Joe Estevez DO  Admission Date:         Admission Orders      Ordered         07/13/18 2058   Inpatient Admission  Once               Discharge Date: 07/19/18             Resolved Problems  Date Reviewed: 7/19/2018           Resolved     Leukocytosis 7/19/2018       Resolved by  SHEFALI Vergara             Outpatient Tests Requested:  · Per PCP and Dr Joana Dominguez     Complications:  Uncomplicated course     Reason for Admission: deconditioning     Hospital Course:      Altaf Rubi is a 59 y o  female patient who originally presented to the hospital on 7/13/2018 due to deconditioning after having right total hip arthroplasty by Dr Joana Dominguez on July 10, 9863  She received maximum benefit from inpatient rehab and will be discharged home with services, and f/u with PCP and Dr Joana Doimnguez        Please see above list of diagnoses and related plan for additional information       Condition at Discharge: good      Discharge Day Visit / Exam:      Subjective:  Pt denies all complaints  No CP, SOB, N/V/D, constipation, dysuria, dizziness, or light headedness  Denies pain at this time  Controlled on current regimen      Vitals: Blood Pressure: 119/68 (07/19/18 0700)  Pulse: 71 (07/19/18 0700)  Temperature: (!) 96 7 °F (35 9 °C) (07/19/18 0700)  Temp Source: Temporal (07/19/18 0700)  Respirations: 22 (07/19/18 0700)  Height: 5' 2" (157 5 cm) (07/16/18 1309)  Weight - Scale: 89 9 kg (198 lb 3 1 oz) (07/16/18 0600)  SpO2: 99 % (07/19/18 0700)  Exam:   Physical Exam   Constitutional: She is oriented to person, place, and time  She appears well-developed and well-nourished  No distress  HENT:   Head: Normocephalic and atraumatic  Eyes: Right eye exhibits no discharge  Left eye exhibits no discharge  Neck: No JVD present  Cardiovascular: Normal rate, regular rhythm, normal heart sounds and intact distal pulses  Exam reveals no gallop and no friction rub  No murmur heard  Pulmonary/Chest: Effort normal and breath sounds normal  No respiratory distress  She has no wheezes  She has no rales  She exhibits no tenderness  Abdominal: Soft   Bowel sounds are normal  She exhibits no distension  There is no tenderness  There is no rebound and no guarding  Musculoskeletal: She exhibits no edema  Neurological: She is alert and oriented to person, place, and time  Skin: Skin is warm and dry  She is not diaphoretic  No erythema  Right  Hip dressing clean and dry  No erythema or drainage  Psychiatric: She has a normal mood and affect          Discussion with Family: Plan of care discussed with patient      Discharge instructions/Information to patient and family:   See after visit summary for information provided to patient and family        Provisions for Follow-Up Care:  See after visit summary for information related to follow-up care and any pertinent home health orders        Disposition:      Home with VNA Services (Reminder: Complete face to face encounter)     For Discharges to [facility]:   · Not Applicable to this Patient - Not Applicable to this Patient     Planned Readmission: no     Discharge Statement:  I spent 30 minutes discharging the patient  This time was spent on the day of discharge  I had direct contact with the patient on the day of discharge  Greater than 50% of the total time was spent examining patient, answering all patient questions, arranging and discussing plan of care with patient as well as directly providing post-discharge instructions    Additional time then spent on discharge activities      Discharge Medications:  See after visit summary for reconciled discharge medications provided to patient and family

## 2018-07-19 NOTE — PHYSICAL THERAPY NOTE
Physical Therapy  Discharge Summary        Pt is discharged from skilled PT effective today, 7/19/18  Pt with plans to return to home alone this afternoon  Pt's son with plans to come up from [place] tomorrow, to visit with pt  Since 7/14/18 PT Sury, pt has been seen for 5/5 skilled PT tx days for therex, theract, and gait /elevation training  Pt with very good progress in skilled PT, this past week  Improvement assessed with: functional strength, activity tolerance, sit and stand balance, sit <->supine transfers, sit <->stand transfers, SPT's with RW, and gait/elevations  All STG's/LTG's are now achieved  Please see goals below for details on pt's functional mobility status at discharge  STG's = LTG's ( Expiration Date planned for 7/24/18 on PT Sury)     1  Patient will perform all bed mobility with modified independence maintaining right hip precautions in order to get in/out of bed  ACHIEVED ( with right leg )     2  Patient will perform all functional transfers with modified independence in order to  improve ability to perform upright tasks at home and increase independence in home alone environment  ACHIEVED  CURRENTLY: sit <->stand: Independent  CURRENTLY: SPT's with RW    3  Patient will ambulate with modified independence WBAT on right LE x at least 200 ft with least restrictive assistive device in order to safely access all necessary areas of home  ACHIEVED  CURRENTLY: Ambulated with a RW for 500 to 775 feet, WBAT RLE with MOD I    4  Patient will ascend/descend 1 step without HR (post support on left available) with least restrictive assistive device and full flight of steps with right handrail with least restrictive assistive device WBAT on right LE with modified independence to enter/exit home and access 2nd floor (bedroom/bathroom level of home)   ACHIEVED ( with WBAT RLE)  CURRENTLY: Pt negotiated 2 curb steps with RW and MOD I  CURRENTLY:  Pt negotiated a full flight of 12 steps steps, right HR up, NBQC, and MOD I      5  Patient will be independent with HEP  ACHIEVED      Recommendations  1  Home PT to maximize safe mobility within pt's personal environment  2   Use of RW with mobility ( [employer] RW already delivered to pt's room)      Rocio Briscoe, PT

## 2018-07-19 NOTE — PROGRESS NOTES
Per Dr Bear Arms office, may change outer dressing daily as needed - gauze with tegaderm, but leave mesh coating in place until 1st visit on Aug 1st   Rx written to send with pt as she is getting homecare

## 2018-07-19 NOTE — PHYSICAL THERAPY NOTE
Physical Therapy Treatment Note ( 14:19 to 1442)       07/19/18 1442   Pain Assessment   Pain Assessment No/denies pain   Pain Score No Pain  (Pt reported right groin and knee discomfort)   Subjective   Subjective " I don't really have pain; just discomfort"   Transfers   Sit to Stand 7  Independent   Stand to Sit 7  Independent   Stand pivot (SPT with RW and MOD I)   Ambulation/Elevation   Gait pattern Forward Flexion  (Adequate foot clearance, step thru )   Gait Assistance (Amb with a RW for 500 feet, then 270 feet with MOD I)   Stair Management Assistance (Pt negotiated a FF of steps with RHR up and NBQC with: MOD I)   Curbs (Pt negotiated 2 curb steps with RW and MOD I)   Balance   Static Sitting Normal   Dynamic Sitting (Fair ( to maintain right THP's))   Static Standing (Good with RW ( improved, was Fair on PT Eval))   Dynamic Standing (Good (-) with RW ( improved, was Fair (-)  on PT Eval))   Ambulatory (Good (-) with RW ( improved, was Fair (-)  on PT Eval))   Endurance Deficit   Endurance Deficit Yes   Endurance Deficit Description (Limited by right groin and knee discomfort)   Activity Tolerance   Nurse Made Aware (RN (Shaq Rater) made aware of discomfort)   Assessment   Prognosis Excellent   Problem List Decreased strength;Decreased range of motion;Decreased endurance; Impaired balance;Decreased mobility; Impaired vision;Decreased skin integrity;Orthopedic restrictions;Pain   Assessment Pt is discharged from skilled PT effective today, 7/19/18  Pt with plans to return to home alone this afternoon  Pt's son with plans to come up from [place] tomorrow, to visit with pt   Since 7/14/18 PT Eval, pt has been seen for 5/5 skilled PT tx days for therex, theract, and gait /elevation training  Pt with very good progress in skilled PT, this past week  Improvement assessed with: functional strength, activity tolerance, sit and stand balance, sit <->supine transfers, sit <->stand transfers, SPT's with RW, and gait/elevations  All STG's/LTG's are now achieved  Barriers to Discharge Decreased caregiver support   Barriers to Discharge Comments (Outdoor and indoor elevations)   Goals   Patient Goals (" I'm getting Home PT; then plan on going for outpt PT")   STG Expiration Date (Expiration Date planned for 7/24/18 on PT Eval)   LTG Expiration Date (Expiration Date planned for 7/24/18 on PT Eval)   Treatment Day 5   Plan   Treatment/Interventions Functional transfer training;LE strengthening/ROM; Elevations; Therapeutic exercise; Endurance training;Patient/family training;Equipment eval/education; Bed mobility;Gait training; Compensatory technique education;Spoke to nursing;Spoke to case management;OT   Progress Discontinue PT   PT Frequency (Planned for 6x/week on PT Eval)   Recommendation   Equipment Recommended (RW)   PT - OK to Discharge Yes     Vitals  Rest ( seated) SPO2 (RA) 99%, HR 73  After ambulating with a RW for 500 feet ( seated): SPO2 (RA) 99%, HR 81  After negotiating a FF of steps with RHR up ( seated): SPO2 (RA) 97%, HR 90    Mira Soto, PT

## 2018-07-19 NOTE — NURSING NOTE
Right hip dressing per order of hospitalist, acticoat type strip no redness, drainage or aseperation, new 4 x 4 applied with tegaderm applied   Pt instructed to keep dressing on until dr  Visit  Ambulating independently, steady gait   Continue plan fo care until discharge

## 2018-07-20 ENCOUNTER — DOCUMENTATION (OUTPATIENT)
Dept: INPATIENT UNIT | Facility: HOSPITAL | Age: 63
End: 2018-07-20

## 2018-08-02 NOTE — DISCHARGE SUMMARY
Discharge Summary - Medical Valeria Garner Da Re 59 y o  female MRN: [id number]    [facility]  Room / Bed: [address]/E2 -* Encounter: [id number]    BRIEF OVERVIEW  Admitting Provider: Shanae Mcpherson MD  Discharge Provider: No att  providers found  Primary Care Physician at Discharge: Gabby Banda, [address]      Admission Date: 7/10/2018     Discharge Date: 7/13/2018  7:20 PM    Primary Discharge Diagnosis  Principal Problem:    Primary localized osteoarthritis of pelvic region and thigh  Resolved Problems:    * No resolved hospital problems  *        Discharge Disposition: Home with [address] [employer] [facility]    Procedure: R SARAH    Discharge Medications:  See after visit summary for reconciled discharge medications provided to patient and family  Allergies  Allergies   Allergen Reactions    Lisinopril Cough    Ace Inhibitors Cough     Category: Adverse Reaction;          [facility] Course  Patient was transferred to the recovery room post operatively in stable condition  On POD #1 patients vital signs were stable  She  was seen by Physical Therapy and was progressing well  Patient was medically stable  She continued to be orthopaedically and medically stable for the rest of Her inpatient stay and was cleared for discharge on Post Op Day 3      Physical Exam at Discharge  stable

## 2018-08-06 ENCOUNTER — TELEPHONE (OUTPATIENT)
Dept: FAMILY MEDICINE CLINIC | Facility: CLINIC | Age: 63
End: 2018-08-06

## 2018-12-26 ENCOUNTER — OFFICE VISIT (OUTPATIENT)
Dept: FAMILY MEDICINE CLINIC | Facility: CLINIC | Age: 63
End: 2018-12-26
Payer: COMMERCIAL

## 2018-12-26 VITALS
HEIGHT: 64 IN | TEMPERATURE: 97.5 F | DIASTOLIC BLOOD PRESSURE: 80 MMHG | WEIGHT: 215.8 LBS | SYSTOLIC BLOOD PRESSURE: 120 MMHG | BODY MASS INDEX: 36.84 KG/M2

## 2018-12-26 DIAGNOSIS — J01.90 ACUTE NON-RECURRENT SINUSITIS, UNSPECIFIED LOCATION: Primary | ICD-10-CM

## 2018-12-26 PROCEDURE — 99213 OFFICE O/P EST LOW 20 MIN: CPT | Performed by: FAMILY MEDICINE

## 2018-12-26 PROCEDURE — 1036F TOBACCO NON-USER: CPT | Performed by: FAMILY MEDICINE

## 2018-12-26 RX ORDER — AZITHROMYCIN 500 MG/1
500 TABLET, FILM COATED ORAL DAILY
Qty: 3 TABLET | Refills: 0 | Status: SHIPPED | OUTPATIENT
Start: 2018-12-26 | End: 2018-12-29

## 2018-12-26 RX ORDER — AZITHROMYCIN 500 MG/1
500 TABLET, FILM COATED ORAL DAILY
Qty: 3 TABLET | Refills: 0 | Status: SHIPPED | OUTPATIENT
Start: 2018-12-26 | End: 2018-12-26

## 2018-12-27 NOTE — PROGRESS NOTES
Assessment/Plan:    Treat his possible sinusitis with 3 day Z-Jordan  Recommend steam and ocean nasal spray  Diagnoses and all orders for this visit:    Acute non-recurrent sinusitis, unspecified location  -     Discontinue: azithromycin (ZITHROMAX) 500 MG tablet; Take 1 tablet (500 mg total) by mouth daily for 3 days  -     azithromycin (ZITHROMAX) 500 MG tablet; Take 1 tablet (500 mg total) by mouth daily for 3 days        1  Acute non-recurrent sinusitis, unspecified location  azithromycin (ZITHROMAX) 500 MG tablet    DISCONTINUED: azithromycin (ZITHROMAX) 500 MG tablet       Subjective:        Patient ID: Timm Schaumann Da Re is a 59 y o  female  Chief Complaint   Patient presents with    URI     congestion, cough; pt states that she is breathing heavier than normal  Coughing up little mucus, no fever       Patient with sinus like symptoms for number of days  Positive pressure  Positive drainage      URI    Associated symptoms include congestion  Pertinent negatives include no chest pain, coughing, ear pain, headaches or sore throat  The following portions of the patient's history were reviewed and updated as appropriate: past medical history, past surgical history and problem list       Review of Systems   Constitutional: Negative for fatigue and fever  HENT: Positive for congestion and sinus pressure  Negative for ear pain and sore throat  Respiratory: Negative for cough  Cardiovascular: Negative for chest pain  Neurological: Negative for headaches  Objective:  /80 (BP Location: Left arm, Patient Position: Sitting, Cuff Size: Large)   Temp 97 5 °F (36 4 °C)   Ht 5' 4" (1 626 m)   Wt 97 9 kg (215 lb 12 8 oz)   BMI 37 04 kg/m²        Physical Exam   Constitutional: She appears well-nourished  HENT:   Mouth/Throat: No oropharyngeal exudate  Minimal nasal congestion   Eyes: Conjunctivae are normal    Cardiovascular: Normal rate and regular rhythm      Pulmonary/Chest: Breath sounds normal    Musculoskeletal: She exhibits no edema  Lymphadenopathy:     She has no cervical adenopathy  Neurological: She is alert  Nursing note and vitals reviewed

## 2019-06-20 DIAGNOSIS — F41.9 ANXIETY: ICD-10-CM

## 2019-06-20 RX ORDER — VENLAFAXINE HYDROCHLORIDE 150 MG/1
CAPSULE, EXTENDED RELEASE ORAL
Qty: 90 CAPSULE | Refills: 3 | Status: SHIPPED | OUTPATIENT
Start: 2019-06-20 | End: 2020-04-03 | Stop reason: SDUPTHER

## 2019-07-03 DIAGNOSIS — I10 BENIGN ESSENTIAL HYPERTENSION: ICD-10-CM

## 2019-07-05 RX ORDER — LOSARTAN POTASSIUM 50 MG/1
50 TABLET ORAL DAILY
Qty: 90 TABLET | Refills: 3 | Status: SHIPPED | OUTPATIENT
Start: 2019-07-05 | End: 2020-04-03 | Stop reason: SDUPTHER

## 2019-07-26 ENCOUNTER — OFFICE VISIT (OUTPATIENT)
Dept: FAMILY MEDICINE CLINIC | Facility: CLINIC | Age: 64
End: 2019-07-26
Payer: COMMERCIAL

## 2019-07-26 VITALS
HEART RATE: 86 BPM | OXYGEN SATURATION: 95 % | BODY MASS INDEX: 37.74 KG/M2 | WEIGHT: 213 LBS | TEMPERATURE: 97.6 F | HEIGHT: 63 IN

## 2019-07-26 DIAGNOSIS — Z12.39 SCREENING FOR BREAST CANCER: ICD-10-CM

## 2019-07-26 DIAGNOSIS — E66.9 OBESITY (BMI 35.0-39.9 WITHOUT COMORBIDITY): ICD-10-CM

## 2019-07-26 DIAGNOSIS — Z00.00 ENCOUNTER FOR WELL ADULT EXAM WITHOUT ABNORMAL FINDINGS: Primary | ICD-10-CM

## 2019-07-26 DIAGNOSIS — I10 ESSENTIAL HYPERTENSION: Chronic | ICD-10-CM

## 2019-07-26 DIAGNOSIS — E04.1 NONTOXIC SINGLE THYROID NODULE: ICD-10-CM

## 2019-07-26 PROCEDURE — 99396 PREV VISIT EST AGE 40-64: CPT | Performed by: FAMILY MEDICINE

## 2019-07-26 NOTE — PROGRESS NOTES
Assessment/Plan:     Diagnoses and all orders for this visit:    Encounter for well adult exam without abnormal findings    Obesity (BMI 35 0-39 9 without comorbidity)  -     Ambulatory referral to Weight Management; Future  -     Comprehensive metabolic panel; Future    Essential hypertension  -     Comprehensive metabolic panel; Future  -     Lipid Panel with Direct LDL reflex; Future    Nontoxic single thyroid nodule  -     TSH, 3rd generation; Future  -     US thyroid; Future    Screening for breast cancer  -     Mammo screening bilateral w 3d & cad; Future  -     US breast screening bilateral complete (ABUS); Future          Subjective:   Chief Complaint   Patient presents with    Physical Exam     pt states she has no problems or concerns       Patient ID: Dot Rubi is a 62 y o  female      HPI    The following portions of the patient's history were reviewed and updated as appropriate: allergies, current medications, past family history, past medical history, past social history, past surgical history and problem list     Review of Systems      Objective:      Pulse 86   Temp 97 6 °F (36 4 °C) (Temporal)   Ht 5' 3" (1 6 m)   Wt 96 6 kg (213 lb)   SpO2 95%   BMI 37 73 kg/m²          Physical Exam

## 2019-07-26 NOTE — PROGRESS NOTES
BMI Counseling: Body mass index is 37 73 kg/m²  Discussed the patient's BMI with her  The BMI is above average  BMI counseling and education was provided to the patient  Nutrition recommendations include reducing portion sizes, decreasing overall calorie intake, 3-5 servings of fruits/vegetables daily, reducing fast food intake, decreasing soda and/or juice intake, increasing intake of lean protein and reducing intake of cholesterol  Exercise recommendations include exercising 3-5 times per week

## 2019-08-23 PROBLEM — E66.01 SEVERE OBESITY (BMI 35.0-39.9) WITH COMORBIDITY (HCC): Chronic | Status: ACTIVE | Noted: 2018-07-13

## 2019-08-23 NOTE — PROGRESS NOTES
Assessment/Plan:    Severe obesity (BMI 35 0-39  9) with comorbidity ([address] )  -Discussed options of HealthyCORE-Intensive Lifestyle Intervention Program, Very Low Calorie Diet-VLCD and Conservative Program and the role of weight loss medications  -not interested in bariatric surgery  -Initial weight loss goal of 5-10% weight loss for improved health  -Screening labs: A1c, and fasting insulin with CMP, LP, and TSH that is ordered  -Patient is interested in HealthyCORE-Intensive Lifestyle Intervention Program, but may consider VLCD    Contraindications: no  Labs ordered: yes  HTN meds addressed: Monitor blood pressure and notify the provider if consistently around 100/60 or you have symptoms of low blood pressure (lightheadedness/dizziness)  DM2 meds addressed: n/a  VLCD time restriction based on BMI: no    Essential hypertension  -on losartan    Follow up for HealthyCore and in approximately 3 months with Non-Surgical Physician/Advanced Practitioner  Samples provided    Colonoscopy-per pt, due every five years, completed most recent in 2018    Goals:  Food log (ie ) www myfitnesspal com,sparkpeople  com,loseit com,calorieking  com,etc  baritastic  No sugary beverages  At least 64oz of water daily  80 oz while on VLCD  Increase physical activity by 10 minutes daily  Gradually increase physical activity to a goal of 5 days per week for 30 minutes of MODERATE intensity PLUS 2 days per week of FULL BODY resistance training  5-10 servings of fruits and vegetables per day and 25-35 grams of dietary fiber per day, gradually increasing  Monitor blood pressure and notify the provider if consistently around 100/60 or you have symptoms of low blood pressure (lightheadedness/dizziness)  Limit caffeine to 16oz per day   Recommend checking lab coverage before having labs drawn    Diagnoses and all orders for this visit:    Severe obesity (BMI 35 0-39  9) with comorbidity ([address] )  -     Ambulatory referral to Weight Management    Essential hypertension    IFG (impaired fasting glucose)    Subjective:   Chief Complaint   Patient presents with    Consult     mwm consult      Patient ID: Marlana Kussmaul Da Re  is a 62 y o  female with excess weight/obesity here to pursue weight management  Past Medical History:   Diagnosis Date    ACE-inhibitor cough     Last assessed 12/04/15    Anxiety     Arthritis     Chronic pain disorder     Depression     Disease of thyroid gland     benign nodule    History of transfusion     no adverse reaction    Hypertension     Wears glasses      HPI:  Obesity/Excess Weight:  Severity: Severe  Onset:  Entire life, worsened in her 25s with stressors, was highest at this time (235) but since would yo-yo with different programs    Modifiers: Commercial Weight Loss Programs-ie  Weight Watchers, [employer], Nutrisystem, etc  and [employer] food (through RD)  Contributing factors: Stress/Emotional Eating  Associated symptoms: fatigue and decreased self esteem    Goals: 160-170, improve above sx  Hydration: recently trying to reach 64 oz of water + diet ice tea + 5-6 cups of coffee per day - black  Alcohol: once a week - 4 glasses  STOPBAN/8    The following portions of the patient's history were reviewed and updated as appropriate: allergies, current medications, past family history, past medical history, past social history, past surgical history and problem list     Review of Systems   Constitutional: Negative for chills and fever  HENT: Negative for sore throat  Respiratory: Negative for cough and shortness of breath  Cardiovascular: Negative for chest pain and palpitations  Gastrointestinal: Negative for constipation and diarrhea  Genitourinary: Negative for dysuria  Musculoskeletal: Negative for arthralgias  Skin: Negative for rash  Neurological: Negative for headaches (denies hx of migraines)     Psychiatric/Behavioral:        Feels down <1/2 of the week, but feels overall good  Denies SI/HI  Encouraged continued management with PCP especially with any new, worsening, or persisting sx     Objective:    /70 (BP Location: Left arm, Patient Position: Sitting, Cuff Size: Adult)   Pulse 87   Temp 98 4 °F (36 9 °C) (Tympanic)   Resp 16   Ht 5' 3 6" (1 615 m)   Wt 98 3 kg (216 lb 12 8 oz)   BMI 37 68 kg/m²     Physical Exam   Nursing note and vitals reviewed  Constitutional   General appearance: Abnormal   well developed and obese  Eyes No conjunctival pallor  Ears, Nose, Mouth, and Throat Oral mucosa moist    Pulmonary   Respiratory effort: No increased work of breathing or signs of respiratory distress  Auscultation of lungs: Clear to auscultation, equal breath sounds bilaterally, no wheezes, no rales, no rhonci  Cardiovascular   Auscultation of heart: Normal rate and rhythm, normal S1 and S2, without murmurs  Examination of extremities for edema and/or varicosities: Normal   no edema  Abdomen   Abdomen: Abnormal   The abdomen was obese  Bowel sounds were normal  The abdomen was soft and nontender     Musculoskeletal   Gait and station: Normal     Psychiatric   Orientation to person, place and time: Normal     Affect: appropriate

## 2019-08-23 NOTE — ASSESSMENT & PLAN NOTE
-Discussed options of HealthyCORE-Intensive Lifestyle Intervention Program, Very Low Calorie Diet-VLCD and Conservative Program and the role of weight loss medications    -not interested in bariatric surgery  -Initial weight loss goal of 5-10% weight loss for improved health  -Screening labs: A1c, and fasting insulin with CMP, LP, and TSH that is ordered  -Patient is interested in Healthy[facility]-Intensive Lifestyle Intervention Program, but may consider VLCD    Contraindications: no  Labs ordered: yes  HTN meds addressed: Monitor blood pressure and notify the provider if consistently around 100/60 or you have symptoms of low blood pressure (lightheadedness/dizziness)  DM2 meds addressed: n/a  VLCD time restriction based on BMI: no

## 2019-08-28 ENCOUNTER — OFFICE VISIT (OUTPATIENT)
Dept: BARIATRICS | Facility: CLINIC | Age: 64
End: 2019-08-28
Payer: COMMERCIAL

## 2019-08-28 VITALS
RESPIRATION RATE: 16 BRPM | DIASTOLIC BLOOD PRESSURE: 70 MMHG | WEIGHT: 216.8 LBS | SYSTOLIC BLOOD PRESSURE: 120 MMHG | HEART RATE: 87 BPM | TEMPERATURE: 98.4 F | BODY MASS INDEX: 37.01 KG/M2 | HEIGHT: 64 IN

## 2019-08-28 DIAGNOSIS — I10 ESSENTIAL HYPERTENSION: Chronic | ICD-10-CM

## 2019-08-28 DIAGNOSIS — R73.01 IFG (IMPAIRED FASTING GLUCOSE): ICD-10-CM

## 2019-08-28 DIAGNOSIS — E66.01 SEVERE OBESITY (BMI 35.0-39.9) WITH COMORBIDITY (HCC): Primary | ICD-10-CM

## 2019-08-28 PROCEDURE — 99244 OFF/OP CNSLTJ NEW/EST MOD 40: CPT | Performed by: PHYSICIAN ASSISTANT

## 2019-08-28 NOTE — PATIENT INSTRUCTIONS
Goals: Food log (ie ) www myfitnesspal com,sparkpeople  com,loseit com,calorieking  com,etc  baritastic  No sugary beverages  At least 64oz of water daily  80 oz while on VLCD  Increase physical activity by 10 minutes daily   Gradually increase physical activity to a goal of 5 days per week for 30 minutes of MODERATE intensity PLUS 2 days per week of FULL BODY resistance training  5-10 servings of fruits and vegetables per day and 25-35 grams of dietary fiber per day, gradually increasing  Monitor blood pressure and notify the provider if consistently around 100/60 or you have symptoms of low blood pressure (lightheadedness/dizziness)  Limit caffeine to 16oz per day   Recommend checking lab coverage before having labs drawn

## 2019-09-30 ENCOUNTER — OFFICE VISIT (OUTPATIENT)
Dept: FAMILY MEDICINE CLINIC | Facility: CLINIC | Age: 64
End: 2019-09-30
Payer: COMMERCIAL

## 2019-09-30 VITALS
OXYGEN SATURATION: 99 % | RESPIRATION RATE: 16 BRPM | TEMPERATURE: 97.5 F | WEIGHT: 210.4 LBS | BODY MASS INDEX: 35.92 KG/M2 | HEIGHT: 64 IN | HEART RATE: 72 BPM

## 2019-09-30 DIAGNOSIS — Z01.818 PREOP GENERAL PHYSICAL EXAM: Primary | ICD-10-CM

## 2019-09-30 DIAGNOSIS — H02.832 DERMATOCHALASIS OF UPPER AND LOWER EYELIDS OF BOTH EYES: ICD-10-CM

## 2019-09-30 DIAGNOSIS — H02.831 DERMATOCHALASIS OF UPPER AND LOWER EYELIDS OF BOTH EYES: ICD-10-CM

## 2019-09-30 DIAGNOSIS — H02.835 DERMATOCHALASIS OF UPPER AND LOWER EYELIDS OF BOTH EYES: ICD-10-CM

## 2019-09-30 DIAGNOSIS — H02.834 DERMATOCHALASIS OF UPPER AND LOWER EYELIDS OF BOTH EYES: ICD-10-CM

## 2019-09-30 PROCEDURE — 99242 OFF/OP CONSLTJ NEW/EST SF 20: CPT | Performed by: FAMILY MEDICINE

## 2019-09-30 PROCEDURE — 93000 ELECTROCARDIOGRAM COMPLETE: CPT | Performed by: FAMILY MEDICINE

## 2019-09-30 RX ORDER — CEPHALEXIN 500 MG/1
2000 CAPSULE ORAL AS NEEDED
Refills: 3 | COMMUNITY
Start: 2019-09-16 | End: 2021-07-29

## 2019-09-30 NOTE — PROGRESS NOTES
[facility] was seen today for pre-op exam     Diagnoses and all orders for this visit:    Preop general physical exam  -     POCT ECG    Dermatochalasis of upper and lower eyelids of both eyes       Subjective:      Kevin Rubi is a 62 y o  female who presents to the office today for a preoperative consultation at the request of surgeon Loretta Yoon MD who plans on performing blephoraplasy, upper and lower, brow lift on October 16  Planned anesthesia is general  The patient has the following known anesthesia issues: no complications  Patient has a bleeding risk of: no recent abnormal bleeding  Review of Systems  Review of Systems   Constitutional: Negative for fatigue and fever  HENT: Negative for postnasal drip and rhinorrhea  Eyes: Positive for visual disturbance  Respiratory: Negative for shortness of breath  Cardiovascular: Negative for chest pain and leg swelling  Gastrointestinal: Negative  Genitourinary: Negative  Musculoskeletal: Negative for arthralgias  Neurological: Negative  Hematological: Negative  Psychiatric/Behavioral: Negative  Objective:      Physical Exam    Pulse 72   Temp 97 5 °F (36 4 °C)   Resp 16   Ht 5' 3 6" (1 615 m)   Wt 95 4 kg (210 lb 6 4 oz)   SpO2 99%   BMI 36 57 kg/m²     Physical Exam   Constitutional: She is oriented to person, place, and time  She appears well-developed and well-nourished  Pleasant 62year old female who appears younger than her stated age with BMI of 36%   Eyes: Pupils are equal, round, and reactive to light  Neck: Normal range of motion  No thyromegaly present  Cardiovascular: Normal rate, regular rhythm, normal heart sounds and intact distal pulses  No carotid abdominal or femoral bruit   Pulmonary/Chest: Effort normal and breath sounds normal    Abdominal: Soft  Neurological: She is alert and oriented to person, place, and time  Skin: Skin is warm  Psychiatric: She has a normal mood and affect   Her behavior is normal  Judgment and thought content normal    Vitals reviewed  Predictors of intubation difficulty:  Morbid obesity? no  Anatomically abnormal facies? no  Short, thick neck? no  Neck range of motion: normal    Cardiographics  ECG: normal sinus rhythm, no blocks or conduction defects, no ischemic changes        Assessment:      62 y o  female with planned surgery as above  Known risk factors for perioperative complications: None    Difficulty with intubation is not anticipated  Patient has history of uncomplicated intubation  Can walk 2 blocks without difficulty:  Only inhibited by orthopedic issues  Can walk up 2 flights of stairs without difficulty:      1  Preop general physical exam  POCT ECG   2  Dermatochalasis of upper and lower eyelids of both eyes            Plan:         Patient is medically cleared for surgery  Patient can hold her blood pressure medication (angiotensin receptor blocker) morning of surgery, due to general anesthesia      Lisinopril and Ace inhibitors  Past Medical History:   Diagnosis Date    ACE-inhibitor cough     Last assessed 12/04/15    Anxiety     Arthritis     Chronic pain disorder     Depression     Disease of thyroid gland     benign nodule    History of transfusion     no adverse reaction    Hypertension     Wears glasses      Past Surgical History:   Procedure Laterality Date     SECTION      CHOLECYSTECTOMY      COLONOSCOPY      JOINT REPLACEMENT Left     hip    ME TOTAL HIP ARTHROPLASTY Right 7/10/2018    Procedure: ARTHROPLASTY HIP TOTAL;  Surgeon: Philippe Dickens MD;  Location: [facility];  Service: Orthopedics    TONSILLECTOMY AND ADENOIDECTOMY      TUBAL LIGATION      VAGINAL HYSTERECTOMY Bilateral 2007    nd ooperectomy     Patient Active Problem List   Diagnosis    Essential hypertension    Cervical radiculopathy    Irritable bowel syndrome    Nontoxic single thyroid nodule    Vitamin D deficiency    Vitamin B12 deficiency    Menopausal symptoms    Anxiety    Primary localized osteoarthritis of pelvic region and thigh    Hx of total hip arthroplasty, right    Chronic back pain    Severe obesity (BMI 35 0-39  9) with comorbidity ([address] )     Social History     Socioeconomic History    Marital status:      Spouse name: None    Number of children: None    Years of education: None    Highest education level: None   Occupational History    None   Social Needs    Financial resource strain: None    Food insecurity:     Worry: None     Inability: None    Transportation needs:     Medical: None     Non-medical: None   Tobacco Use    Smoking status: Former Smoker     Last attempt to quit:      Years since quittin 7    Smokeless tobacco: Never Used   Substance and Sexual Activity    Alcohol use:  Yes     Alcohol/week: 3 0 standard drinks     Types: 3 Glasses of wine per week     Frequency: Monthly or less     Comment: on the weekend; not much    Drug use: No    Sexual activity: None   Lifestyle    Physical activity:     Days per week: None     Minutes per session: None    Stress: None   Relationships    Social connections:     Talks on phone: None     Gets together: None     Attends [employer] service: None     Active member of club or organization: None     Attends meetings of clubs or organizations: None     Relationship status: None    Intimate partner violence:     Fear of current or ex partner: None     Emotionally abused: None     Physically abused: None     Forced sexual activity: None   Other Topics Concern    None   Social History Narrative    As per allscripts: Single       Current Outpatient Medications:     Ascorbic Acid (VITAMIN C-ANNA HIPS ER) 1000 MG TBCR, Take 1 tablet by mouth daily, Disp: , Rfl:     b complex vitamins capsule, Take 1 capsule by mouth daily, Disp: , Rfl:     cephalexin (KEFLEX) 500 mg capsule, Take 2,000 mg by mouth as needed 1 hour prior to dental appointment, Disp: , Rfl: 3    Cholecalciferol (VITAMIN D PO), Take 4,000 Units by mouth daily, Disp: , Rfl:     losartan (COZAAR) 50 mg tablet, Take 1 tablet (50 mg total) by mouth daily, Disp: 90 tablet, Rfl: 3    Magnesium 200 MG TABS, Take 200 mg by mouth daily, Disp: , Rfl:     Multiple Vitamin (MULTIVITAMINS PO), Take by mouth, Disp: , Rfl:     venlafaxine (EFFEXOR-XR) 150 mg 24 hr capsule, TAKE ONE CAPSULE BY MOUTH EVERY DAY, Disp: 90 capsule, Rfl: 3  Lab Results   Component Value Date    WBC 7 20 07/14/2018    HGB 10 3 (L) 07/14/2018    HCT 30 5 (L) 07/14/2018    MCV 85 07/14/2018     (L) 07/14/2018     Lab Results   Component Value Date     12/21/2017    K 4 0 07/14/2018    CL 98 07/14/2018    CO2 30 07/14/2018    BUN 13 07/14/2018    CREATININE 0 47 (L) 07/14/2018    CALCIUM 8 4 07/14/2018    AST 23 07/17/2018    ALT 32 07/17/2018    ALKPHOS 73 07/17/2018    PROT 6 2 12/21/2017    BILITOT 0 5 12/21/2017    EGFR 106 07/14/2018     No results found for: [facility]

## 2019-10-11 ENCOUNTER — TELEPHONE (OUTPATIENT)
Dept: FAMILY MEDICINE CLINIC | Facility: CLINIC | Age: 64
End: 2019-10-11

## 2019-10-11 NOTE — TELEPHONE ENCOUNTER
Pt called the office to confirm that her pre op paperwork was faxed to [facility] pt was seen on 9/30/19  Informed pt yes it was faxed over on 10/7/19

## 2019-12-24 ENCOUNTER — HOSPITAL ENCOUNTER (OUTPATIENT)
Dept: ULTRASOUND IMAGING | Facility: HOSPITAL | Age: 64
Discharge: HOME | End: 2019-12-24
Payer: COMMERCIAL

## 2019-12-24 DIAGNOSIS — E04.1 NONTOXIC SINGLE THYROID NODULE: ICD-10-CM

## 2019-12-24 PROCEDURE — 76536 US EXAM OF HEAD AND NECK: CPT

## 2019-12-26 DIAGNOSIS — Z12.39 SCREENING FOR BREAST CANCER: ICD-10-CM

## 2020-01-01 LAB
ALBUMIN SERPL-MCNC: 4.5 G/DL (ref 3.6–5.1)
ALBUMIN/GLOB SERPL: 2 (CALC) (ref 1–2.5)
ALP SERPL-CCNC: 71 U/L (ref 33–130)
ALT SERPL-CCNC: 34 U/L (ref 6–29)
AST SERPL-CCNC: 23 U/L (ref 10–35)
BILIRUB SERPL-MCNC: 0.7 MG/DL (ref 0.2–1.2)
BUN SERPL-MCNC: 13 MG/DL (ref 7–25)
BUN/CREAT SERPL: ABNORMAL (CALC) (ref 6–22)
CALCIUM SERPL-MCNC: 9 MG/DL (ref 8.6–10.4)
CHLORIDE SERPL-SCNC: 103 MMOL/L (ref 98–110)
CHOLEST SERPL-MCNC: 195 MG/DL
CHOLEST/HDLC SERPL: 3.2 (CALC)
CO2 SERPL-SCNC: 30 MMOL/L (ref 20–32)
CREAT SERPL-MCNC: 0.74 MG/DL (ref 0.5–0.99)
GLOBULIN SER CALC-MCNC: 2.2 G/DL (CALC) (ref 1.9–3.7)
GLUCOSE SERPL-MCNC: 96 MG/DL (ref 65–99)
HDLC SERPL-MCNC: 61 MG/DL
LDLC SERPL CALC-MCNC: 111 MG/DL (CALC)
NONHDLC SERPL-MCNC: 134 MG/DL (CALC)
POTASSIUM SERPL-SCNC: 4.5 MMOL/L (ref 3.5–5.3)
PROT SERPL-MCNC: 6.7 G/DL (ref 6.1–8.1)
SL AMB EGFR AFRICAN AMERICAN: 100 ML/MIN/1.73M2
SL AMB EGFR NON AFRICAN AMERICAN: 86 ML/MIN/1.73M2
SODIUM SERPL-SCNC: 140 MMOL/L (ref 135–146)
TRIGL SERPL-MCNC: 124 MG/DL
TSH SERPL-ACNC: 1.78 MIU/L (ref 0.4–4.5)

## 2020-04-03 ENCOUNTER — TELEMEDICINE (OUTPATIENT)
Dept: FAMILY MEDICINE CLINIC | Facility: CLINIC | Age: 65
End: 2020-04-03
Payer: COMMERCIAL

## 2020-04-03 DIAGNOSIS — R31.9 URINARY TRACT INFECTION WITH HEMATURIA, SITE UNSPECIFIED: Primary | ICD-10-CM

## 2020-04-03 DIAGNOSIS — N39.0 URINARY TRACT INFECTION WITH HEMATURIA, SITE UNSPECIFIED: Primary | ICD-10-CM

## 2020-04-03 DIAGNOSIS — I10 BENIGN ESSENTIAL HYPERTENSION: ICD-10-CM

## 2020-04-03 DIAGNOSIS — F41.9 ANXIETY: ICD-10-CM

## 2020-04-03 LAB
SL AMB  POCT GLUCOSE, UA: NEGATIVE
SL AMB LEUKOCYTE ESTERASE,UA: 4
SL AMB POCT BILIRUBIN,UA: NEGATIVE
SL AMB POCT BLOOD,UA: 4
SL AMB POCT CLARITY,UA: CLEAR
SL AMB POCT COLOR,UA: ABNORMAL
SL AMB POCT KETONES,UA: NEGATIVE
SL AMB POCT NITRITE,UA: ABNORMAL
SL AMB POCT PH,UA: 5.6
SL AMB POCT SPECIFIC GRAVITY,UA: 1.01
SL AMB POCT URINE PROTEIN: NEGATIVE
SL AMB POCT UROBILINOGEN: NEGATIVE

## 2020-04-03 PROCEDURE — 1036F TOBACCO NON-USER: CPT | Performed by: FAMILY MEDICINE

## 2020-04-03 PROCEDURE — 81002 URINALYSIS NONAUTO W/O SCOPE: CPT | Performed by: FAMILY MEDICINE

## 2020-04-03 PROCEDURE — 99212 OFFICE O/P EST SF 10 MIN: CPT | Performed by: FAMILY MEDICINE

## 2020-04-03 PROCEDURE — 3078F DIAST BP <80 MM HG: CPT | Performed by: FAMILY MEDICINE

## 2020-04-03 PROCEDURE — 3074F SYST BP LT 130 MM HG: CPT | Performed by: FAMILY MEDICINE

## 2020-04-03 RX ORDER — VENLAFAXINE HYDROCHLORIDE 150 MG/1
150 CAPSULE, EXTENDED RELEASE ORAL DAILY
Qty: 90 CAPSULE | Refills: 3 | Status: SHIPPED | OUTPATIENT
Start: 2020-04-03 | End: 2021-02-04 | Stop reason: SDUPTHER

## 2020-04-03 RX ORDER — SULFAMETHOXAZOLE AND TRIMETHOPRIM 800; 160 MG/1; MG/1
1 TABLET ORAL EVERY 12 HOURS SCHEDULED
Qty: 14 TABLET | Refills: 0 | Status: SHIPPED | OUTPATIENT
Start: 2020-04-03 | End: 2020-04-10

## 2020-04-03 RX ORDER — LOSARTAN POTASSIUM 50 MG/1
50 TABLET ORAL DAILY
Qty: 90 TABLET | Refills: 3 | Status: SHIPPED | OUTPATIENT
Start: 2020-04-03 | End: 2021-04-27 | Stop reason: SDUPTHER

## 2020-07-29 ENCOUNTER — OFFICE VISIT (OUTPATIENT)
Dept: FAMILY MEDICINE CLINIC | Facility: CLINIC | Age: 65
End: 2020-07-29
Payer: COMMERCIAL

## 2020-07-29 VITALS
SYSTOLIC BLOOD PRESSURE: 140 MMHG | WEIGHT: 228.8 LBS | RESPIRATION RATE: 16 BRPM | OXYGEN SATURATION: 98 % | HEIGHT: 63 IN | DIASTOLIC BLOOD PRESSURE: 80 MMHG | BODY MASS INDEX: 40.54 KG/M2 | HEART RATE: 82 BPM | TEMPERATURE: 96.9 F

## 2020-07-29 DIAGNOSIS — Z12.39 BREAST CANCER SCREENING: ICD-10-CM

## 2020-07-29 DIAGNOSIS — I10 ESSENTIAL HYPERTENSION: Chronic | ICD-10-CM

## 2020-07-29 DIAGNOSIS — E04.1 NONTOXIC SINGLE THYROID NODULE: ICD-10-CM

## 2020-07-29 DIAGNOSIS — Z00.00 HEALTH CARE MAINTENANCE: Primary | ICD-10-CM

## 2020-07-29 PROCEDURE — 3008F BODY MASS INDEX DOCD: CPT | Performed by: FAMILY MEDICINE

## 2020-07-29 PROCEDURE — 3079F DIAST BP 80-89 MM HG: CPT | Performed by: FAMILY MEDICINE

## 2020-07-29 PROCEDURE — 99396 PREV VISIT EST AGE 40-64: CPT | Performed by: FAMILY MEDICINE

## 2020-07-29 PROCEDURE — 3077F SYST BP >= 140 MM HG: CPT | Performed by: FAMILY MEDICINE

## 2020-07-29 NOTE — PROGRESS NOTES
Assessment/Plan:     Diagnoses and all orders for this visit:    Health care maintenance    Essential hypertension  -     Lipid Panel with Direct LDL reflex; Future  -     Comprehensive metabolic panel; Future  -     TSH, 3rd generation; Future  -     Cortisol Level, AM Specimen; Future    BMI 40 0-44 9, adult (HCC)  -     HEMOGLOBIN A1C W/ EAG ESTIMATION; Future  -     Cortisol Level, AM Specimen; Future    Nontoxic single thyroid nodule  -     TSH, 3rd generation; Future  -     Cortisol Level, AM Specimen; Future    Breast cancer screening  -     Mammo screening bilateral w 3d & cad; Future        Patient will be due for annual blood work  Continue to work on fitness and dietary compliance  Subjective:   Chief Complaint   Patient presents with    Well Check      Patient ID: Genoveva Rodriguez Re is a 60 y o  female  Patient is a 80-year-old female here for blood pressure check and yearly  Back is stable  Concern with weight gain  Fitness and dietary compliance could be markedly improved  The following portions of the patient's history were reviewed and updated as appropriate: allergies, current medications, past family history, past medical history, past social history, past surgical history and problem list       Review of Systems   Constitutional: Positive for unexpected weight change (Weight gain)  All other systems reviewed and are negative  Objective:      /80   Pulse 82   Temp (!) 96 9 °F (36 1 °C) (Temporal)   Resp 16   Ht 5' 3"   Wt 104 kg (228 lb 12 8 oz)   SpO2 98%   BMI 40 53 kg/m²          Physical Exam   Constitutional: She is oriented to person, place, and time  She appears well-developed  No distress  HENT:   Head: Normocephalic  Right Ear: Tympanic membrane normal    Left Ear: Tympanic membrane normal    Nose: Nose normal    Mouth/Throat: Mucous membranes are moist    Eyes: Pupils are equal, round, and reactive to light   Conjunctivae are normal    Neck: Normal range of motion  Neck supple  Cardiovascular: Normal rate and regular rhythm  No murmur heard  Pulmonary/Chest: Effort normal and breath sounds normal    Abdominal: Soft  Bowel sounds are normal  She exhibits no mass  There is no abdominal tenderness  Musculoskeletal: Normal range of motion  Neurological: She is alert and oriented to person, place, and time  She has normal reflexes  Skin: Skin is warm and dry     Psychiatric: Her behavior is normal  Mood, judgment and thought content normal

## 2020-07-29 NOTE — PROGRESS NOTES
BMI Counseling: Body mass index is 40 53 kg/m²  The BMI is above normal  Nutrition recommendations include reducing portion sizes, decreasing overall calorie intake, 3-5 servings of fruits/vegetables daily, reducing fast food intake, consuming healthier snacks, decreasing soda and/or juice intake, moderation in carbohydrate intake, increasing intake of lean protein, reducing intake of saturated fat and trans fat and reducing intake of cholesterol  Exercise recommendations include vigorous aerobic physical activity for 75 minutes/week

## 2020-09-09 ENCOUNTER — APPOINTMENT (OUTPATIENT)
Dept: RADIOLOGY | Facility: MEDICAL CENTER | Age: 65
End: 2020-09-09
Payer: COMMERCIAL

## 2020-09-09 ENCOUNTER — TELEPHONE (OUTPATIENT)
Dept: FAMILY MEDICINE CLINIC | Facility: CLINIC | Age: 65
End: 2020-09-09

## 2020-09-09 ENCOUNTER — OFFICE VISIT (OUTPATIENT)
Dept: FAMILY MEDICINE CLINIC | Facility: CLINIC | Age: 65
End: 2020-09-09
Payer: COMMERCIAL

## 2020-09-09 VITALS
BODY MASS INDEX: 40.89 KG/M2 | TEMPERATURE: 96.8 F | SYSTOLIC BLOOD PRESSURE: 142 MMHG | OXYGEN SATURATION: 97 % | WEIGHT: 230.8 LBS | HEIGHT: 63 IN | HEART RATE: 87 BPM | DIASTOLIC BLOOD PRESSURE: 80 MMHG

## 2020-09-09 DIAGNOSIS — T14.8XXA BRUISE: ICD-10-CM

## 2020-09-09 DIAGNOSIS — M79.642 LEFT HAND PAIN: ICD-10-CM

## 2020-09-09 DIAGNOSIS — M79.642 LEFT HAND PAIN: Primary | ICD-10-CM

## 2020-09-09 DIAGNOSIS — I10 ESSENTIAL HYPERTENSION: Chronic | ICD-10-CM

## 2020-09-09 PROCEDURE — 1036F TOBACCO NON-USER: CPT | Performed by: FAMILY MEDICINE

## 2020-09-09 PROCEDURE — 3079F DIAST BP 80-89 MM HG: CPT | Performed by: FAMILY MEDICINE

## 2020-09-09 PROCEDURE — 99213 OFFICE O/P EST LOW 20 MIN: CPT | Performed by: FAMILY MEDICINE

## 2020-09-09 PROCEDURE — 73130 X-RAY EXAM OF HAND: CPT

## 2020-09-09 NOTE — PATIENT INSTRUCTIONS
left hand pain (Complaining of left hand pain and swelling after slamming her hand against wood in her kitchen at home on 9/5/2020 )    Check xray left hand r/o fracture and consult [facility]  Pain is worse with certain movements  No neuro deficits  Rest and ice and elevation left hand due to pain and swelling/bruise

## 2020-09-09 NOTE — PROGRESS NOTES
Assessment/Plan:  Chief Complaint   Patient presents with    left hand pain     Complaining of left hand pain and swelling after slamming her hand against wood in her kitchen at home on 9/5/2020  Patient Instructions   left hand pain (Complaining of left hand pain and swelling after slamming her hand against wood in her kitchen at home on 9/5/2020 )    Check xray left hand r/o fracture and consult [facility]  Pain is worse with certain movements  No neuro deficits  Rest and ice and elevation left hand due to pain and swelling/bruise  No problem-specific Assessment & Plan notes found for this encounter  Diagnoses and all orders for this visit:    Left hand pain  -     XR hand 3+ vw left; Future    Essential hypertension    Bruise          Subjective:      Patient ID: Shamar Rodriguez Re is a 60 y o  female  left hand pain (Complaining of left hand pain and swelling after slamming her hand against wood in her kitchen at home on 9/5/2020 ) hx of HTN and has left hand bruise posteriorly  The following portions of the patient's history were reviewed and updated as appropriate: allergies, current medications, past family history, past medical history, past social history, past surgical history and problem list     Review of Systems   Constitutional: Negative  HENT: Negative  Eyes: Negative  Respiratory: Negative  Cardiovascular: Negative  Gastrointestinal: Negative  Endocrine: Negative  Genitourinary: Negative  Musculoskeletal:        Left posterior hand pain   Skin: Negative  Allergic/Immunologic: Negative  Neurological: Negative  Hematological: Negative  Psychiatric/Behavioral: Negative  Objective:      /80   Pulse 87   Temp (!) 96 8 °F (36 °C) (Temporal)   Ht 5' 3" (1 6 m)   Wt 105 kg (230 lb 12 8 oz)   SpO2 97%   BMI 40 88 kg/m²          Physical Exam  Constitutional:       Appearance: She is well-developed     HENT:      Head: Normocephalic and atraumatic  Right Ear: External ear normal       Left Ear: External ear normal       Nose: Nose normal    Eyes:      Conjunctiva/sclera: Conjunctivae normal       Pupils: Pupils are equal, round, and reactive to light  Neck:      Musculoskeletal: Normal range of motion and neck supple  Cardiovascular:      Rate and Rhythm: Normal rate and regular rhythm  Heart sounds: Normal heart sounds  Pulmonary:      Effort: Pulmonary effort is normal       Breath sounds: Normal breath sounds  Musculoskeletal: Normal range of motion  Comments: Left posterior hand pain after trauma   Skin:     General: Skin is warm and dry  Neurological:      Mental Status: She is alert and oriented to person, place, and time  Deep Tendon Reflexes: Reflexes are normal and symmetric     Psychiatric:         Behavior: Behavior normal

## 2020-09-10 ENCOUNTER — DOCUMENTATION (OUTPATIENT)
Dept: FAMILY MEDICINE CLINIC | Facility: CLINIC | Age: 65
End: 2020-09-10

## 2020-09-10 DIAGNOSIS — M19.042 OSTEOARTHRITIS OF LEFT HAND, UNSPECIFIED OSTEOARTHRITIS TYPE: ICD-10-CM

## 2020-09-10 DIAGNOSIS — M79.642 LEFT HAND PAIN: Primary | ICD-10-CM

## 2020-11-18 ENCOUNTER — TELEPHONE (OUTPATIENT)
Dept: FAMILY MEDICINE CLINIC | Facility: CLINIC | Age: 65
End: 2020-11-18

## 2020-11-23 ENCOUNTER — TELEPHONE (OUTPATIENT)
Dept: FAMILY MEDICINE CLINIC | Facility: CLINIC | Age: 65
End: 2020-11-23

## 2021-02-04 DIAGNOSIS — F41.9 ANXIETY: ICD-10-CM

## 2021-02-04 RX ORDER — VENLAFAXINE HYDROCHLORIDE 150 MG/1
150 CAPSULE, EXTENDED RELEASE ORAL DAILY
Qty: 90 CAPSULE | Refills: 3 | Status: SHIPPED | OUTPATIENT
Start: 2021-02-04 | End: 2022-01-22

## 2021-04-19 ENCOUNTER — IMMUNIZATIONS (OUTPATIENT)
Dept: FAMILY MEDICINE CLINIC | Facility: HOSPITAL | Age: 66
End: 2021-04-19

## 2021-04-19 DIAGNOSIS — Z23 ENCOUNTER FOR IMMUNIZATION: Primary | ICD-10-CM

## 2021-04-19 PROCEDURE — 0011A SARS-COV-2 / COVID-19 MRNA VACCINE (MODERNA) 100 MCG: CPT

## 2021-04-19 PROCEDURE — 91301 SARS-COV-2 / COVID-19 MRNA VACCINE (MODERNA) 100 MCG: CPT

## 2021-04-27 DIAGNOSIS — I10 BENIGN ESSENTIAL HYPERTENSION: ICD-10-CM

## 2021-04-27 RX ORDER — LOSARTAN POTASSIUM 50 MG/1
50 TABLET ORAL DAILY
Qty: 90 TABLET | Refills: 3 | Status: SHIPPED | OUTPATIENT
Start: 2021-04-27 | End: 2022-01-20

## 2021-05-19 ENCOUNTER — IMMUNIZATIONS (OUTPATIENT)
Dept: FAMILY MEDICINE CLINIC | Facility: HOSPITAL | Age: 66
End: 2021-05-19

## 2021-05-19 DIAGNOSIS — Z23 ENCOUNTER FOR IMMUNIZATION: Primary | ICD-10-CM

## 2021-05-19 PROCEDURE — 91301 SARS-COV-2 / COVID-19 MRNA VACCINE (MODERNA) 100 MCG: CPT

## 2021-05-19 PROCEDURE — 0012A SARS-COV-2 / COVID-19 MRNA VACCINE (MODERNA) 100 MCG: CPT

## 2021-07-22 ENCOUNTER — RA CDI HCC (OUTPATIENT)
Dept: OTHER | Facility: HOSPITAL | Age: 66
End: 2021-07-22

## 2021-07-22 NOTE — PROGRESS NOTES
[facility]  coding opportunities             Chart reviewed, (number of) suggestions sent to provider: 1     Problem listed updated  Provider Accepted, (number of) suggestions accepted: 1         Number of suggestions actually used: 0      Number of suggestions NOT actually used: 1     Patients insurance company: [employer] (Medicare Advantage and Commercial)     Visit status: Patient arrived for their scheduled appointment        [facility]  coding opportunities             Chart reviewed, (number of) suggestions sent to provider: 1     Problem listed updated   Provider Accepted, (number of) suggestions accepted: 1               Patients insurance company: [employer] (Medicare Advantage and [employer])           [place] [address]  coding opportunities             Chart reviewed, (number of) suggestions sent to provider: 1                  Patients insurance company: [employer] (Medicare Advantage and Commercial)           BMI<40      E66 01 Morbid (severe) obesity due to excess calories ([place] [address] )       Z68 40 - Z68 -- Body mass index (BMI),adult  (Pick one from the Z68 40 - K53 --)

## 2021-07-29 ENCOUNTER — OFFICE VISIT (OUTPATIENT)
Dept: FAMILY MEDICINE CLINIC | Facility: CLINIC | Age: 66
End: 2021-07-29
Payer: MEDICARE

## 2021-07-29 ENCOUNTER — APPOINTMENT (OUTPATIENT)
Dept: RADIOLOGY | Facility: MEDICAL CENTER | Age: 66
End: 2021-07-29
Payer: MEDICARE

## 2021-07-29 VITALS
TEMPERATURE: 96.8 F | OXYGEN SATURATION: 98 % | BODY MASS INDEX: 39.91 KG/M2 | RESPIRATION RATE: 14 BRPM | HEART RATE: 78 BPM | WEIGHT: 233.8 LBS | DIASTOLIC BLOOD PRESSURE: 88 MMHG | SYSTOLIC BLOOD PRESSURE: 142 MMHG | HEIGHT: 64 IN

## 2021-07-29 DIAGNOSIS — E04.1 NONTOXIC SINGLE THYROID NODULE: ICD-10-CM

## 2021-07-29 DIAGNOSIS — Z23 ENCOUNTER FOR IMMUNIZATION: ICD-10-CM

## 2021-07-29 DIAGNOSIS — Z12.31 ENCOUNTER FOR SCREENING MAMMOGRAM FOR MALIGNANT NEOPLASM OF BREAST: ICD-10-CM

## 2021-07-29 DIAGNOSIS — R06.02 SOBOE (SHORTNESS OF BREATH ON EXERTION): ICD-10-CM

## 2021-07-29 DIAGNOSIS — I10 ESSENTIAL HYPERTENSION: ICD-10-CM

## 2021-07-29 DIAGNOSIS — R09.89 ABNORMAL LUNG SOUNDS: ICD-10-CM

## 2021-07-29 DIAGNOSIS — Z00.00 ENCOUNTER FOR ANNUAL WELLNESS EXAM IN MEDICARE PATIENT: Primary | ICD-10-CM

## 2021-07-29 DIAGNOSIS — R79.9 ABNORMAL FINDING OF BLOOD CHEMISTRY, UNSPECIFIED: ICD-10-CM

## 2021-07-29 PROCEDURE — G0009 ADMIN PNEUMOCOCCAL VACCINE: HCPCS

## 2021-07-29 PROCEDURE — 90732 PPSV23 VACC 2 YRS+ SUBQ/IM: CPT

## 2021-07-29 PROCEDURE — 71046 X-RAY EXAM CHEST 2 VIEWS: CPT

## 2021-07-29 PROCEDURE — G0402 INITIAL PREVENTIVE EXAM: HCPCS | Performed by: FAMILY MEDICINE

## 2021-07-29 PROCEDURE — 1123F ACP DISCUSS/DSCN MKR DOCD: CPT | Performed by: FAMILY MEDICINE

## 2021-07-29 NOTE — PROGRESS NOTES
Assessment and Plan:   Pollo Brunson was seen today for medicare wellness visit and follow-up  Diagnoses and all orders for this visit:    Encounter for annual wellness exam in Medicare patient    Encounter for screening mammogram for malignant neoplasm of breast  -     Mammo screening bilateral w 3d & cad; Future    Nontoxic single thyroid nodule  -     Cortisol Level, AM Specimen; Future  -     TSH, 3rd generation; Future  -     T4, free; Future  -     HEMOGLOBIN A1C W/ EAG ESTIMATION; Future  -     US thyroid; Future    Essential hypertension  -     Cortisol Level, AM Specimen; Future  -     Lipid Panel with Direct LDL reflex; Future  -     TSH, 3rd generation; Future  -     T4, free; Future  -     Comprehensive metabolic panel; Future  -     HEMOGLOBIN A1C W/ EAG ESTIMATION; Future    BMI 40 0-44 9, adult (HCC)  -     Lipid Panel with Direct LDL reflex; Future  -     HEMOGLOBIN A1C W/ EAG ESTIMATION; Future    Abnormal finding of blood chemistry, unspecified   -     HEMOGLOBIN A1C W/ EAG ESTIMATION;  Future    Encounter for immunization  -     PNEUMOCOCCAL POLYSACCHARIDE VACCINE 23-VALENT =>1YO SQ IM    SOBOE (shortness of breath on exertion)  -     Cancel: XR chest pa & lateral; Future  -     XR chest pa & lateral; Future    Abnormal lung sounds  -     Cancel: XR chest pa & lateral; Future  -     XR chest pa & lateral; Future      Problem List Items Addressed This Visit        Endocrine    Nontoxic single thyroid nodule    Relevant Orders    Cortisol Level, AM Specimen    TSH, 3rd generation    T4, free    HEMOGLOBIN A1C W/ EAG ESTIMATION    US thyroid       Cardiovascular and Mediastinum    Essential hypertension (Chronic)    Relevant Orders    Cortisol Level, AM Specimen    Lipid Panel with Direct LDL reflex    TSH, 3rd generation    T4, free    Comprehensive metabolic panel    HEMOGLOBIN A1C W/ EAG ESTIMATION       Other    BMI 40 0-44 9, adult (HCC)    Relevant Orders    Lipid Panel with Direct LDL reflex HEMOGLOBIN A1C W/ EAG ESTIMATION      Other Visit Diagnoses     Encounter for annual wellness exam in Medicare patient    -  Primary    Encounter for screening mammogram for malignant neoplasm of breast        Relevant Orders    Mammo screening bilateral w 3d & cad    Abnormal finding of blood chemistry, unspecified         Relevant Orders    HEMOGLOBIN A1C W/ EAG ESTIMATION    Encounter for immunization        Relevant Orders    PNEUMOCOCCAL POLYSACCHARIDE VACCINE 23-VALENT =>1YO SQ IM (Completed)    SOBOE (shortness of breath on exertion)        Relevant Orders    XR chest pa & lateral (Completed)    Abnormal lung sounds        Relevant Orders    XR chest pa & lateral (Completed)        BMI Counseling: Body mass index is 40 66 kg/m²  The BMI is above normal  Nutrition recommendations include decreasing portion sizes, encouraging healthy choices of fruits and vegetables, decreasing fast food intake, consuming healthier snacks, limiting drinks that contain sugar, moderation in carbohydrate intake, increasing intake of lean protein, reducing intake of saturated and trans fat and reducing intake of cholesterol  Exercise recommendations include vigorous physical activity 75 minutes/week  Preventive health issues were discussed with patient, and age appropriate screening tests were ordered as noted in patient's After Visit Summary  Personalized health advice and appropriate referrals for health education or preventive services given if needed, as noted in patient's After Visit Summary  History of Present Illness:     Patient presents for Medicare Annual Wellness visit  Patient is overdue for blood work and thyroid ultrasound  She was in the mi dst of changing over to Medicare as well as changing her name     You have trouble with fitness and dietary does have intermittent back more fluid type issue for she is working full-time  Review of Systems - History obtained from chart review and the patient  General ROS: positive for  - fatigue, hot flashes and weight gain  Respiratory ROS: positive for - shortness of breath and exertional   Cardiovascular ROS: positive for - shortness of breath and slight chest pressure with exertion  Gastrointestinal ROS: no abdominal pain, change in bowel habits, or black or bloody stools  Genito-Urinary ROS: no dysuria, trouble voiding, or hematuria  Musculoskeletal ROS: positive for - joint stiffness and pain in back - right, greater than left lower, no radiculopathy  Vitals:    21 1215   BP: 142/88   BP Location: Left arm   Patient Position: Sitting   Cuff Size: Adult   Pulse: 78   Resp: 14   Temp: (!) 96 8 °F (36 °C)   TempSrc: Temporal   SpO2: 98%   Weight: 106 kg (233 lb 12 8 oz)   Height: 5' 3 58" (1 615 m)   Patient ambulated in the office well monitoring O2 saturations  Patient did not desaturate during her 5 minute brisk walk (O2 saturation remained 96-98%) Heart rate went up appropriately, she did experience some shortness of breath but denied chest pressure pain     Problem List:     Patient Active Problem List   Diagnosis    Essential hypertension    Cervical radiculopathy    Irritable bowel syndrome    Nontoxic single thyroid nodule    Vitamin D deficiency    Vitamin B12 deficiency    Menopausal symptoms    Anxiety    Primary localized osteoarthritis of pelvic region and thigh    Hx of total hip arthroplasty, right    Chronic back pain    BMI 40 0-44 9, adult [facility])      Past Medical and Surgical History:     Past Medical History:   Diagnosis Date    ACE-inhibitor cough     Last assessed 12/04/15    Anxiety     Arthritis     Chronic pain disorder     Depression     Disease of thyroid gland     benign nodule    History of transfusion     no adverse reaction    Hypertension     Wears glasses      Past Surgical History:   Procedure Laterality Date     SECTION      CHOLECYSTECTOMY      COLONOSCOPY      JOINT REPLACEMENT Left     hip    NH TOTAL HIP ARTHROPLASTY Right 7/10/2018    Procedure: ARTHROPLASTY HIP TOTAL;  Surgeon: Elijah Stearns MD;  Location: [facility];  Service: Orthopedics    TONSILLECTOMY AND ADENOIDECTOMY      TUBAL LIGATION      VAGINAL HYSTERECTOMY Bilateral 2007    nd ooperectomy      Family History:     Family History   Problem Relation Age of Onset    Aortic stenosis Mother     Heart murmur Mother     Hypertension Mother     Heart disease Mother     Hypertension Family     Coronary artery disease Family         Stenosis    Hypertension Father     Cancer Father         head and neck spread to spine and brain    Skin cancer Father     Hypertension Sister     Hypertension Brother     Skin cancer Brother     Hyperlipidemia Brother     Thyroid disease Neg Hx     Diabetes Neg Hx       Social History:     Social History     Socioeconomic History    Marital status:      Spouse name: None    Number of children: None    Years of education: None    Highest education level: None   Occupational History    None   Tobacco Use    Smoking status: Former Smoker     Quit date:      Years since quittin 6    Smokeless tobacco: Never Used   Substance and Sexual Activity    Alcohol use: Yes     Alcohol/week: 3 0 standard drinks     Types: 3 Glasses of wine per week     Comment: on the weekend; not much    Drug use: No    Sexual activity: None   Other Topics Concern    None   Social History Narrative    As per allscripts: Single     Social Determinants of Health     Financial Resource Strain:     Difficulty of Paying Living Expenses:    Food Insecurity:     Worried About Running Out of Food in the Last Year:     Ran Out of Food in the Last Year:    Transportation Needs:     Lack of Transportation (Medical):      Lack of Transportation (Non-Medical):    Physical Activity:     Days of Exercise per Week:     Minutes of Exercise per Session:    Stress:     Feeling of Stress :    Social Connections:  Frequency of Communication with Friends and Family:     Frequency of Social Gatherings with Friends and Family:     Attends Restoration[employer] Services:     Active Member of Clubs or Organizations:     Attends Club or Organization Meetings:     Marital Status:    Intimate Partner Violence:     Fear of Current or Ex-Partner:     Emotionally Abused:     Physically Abused:     Sexually Abused:       Medications and Allergies:     Current Outpatient Medications   Medication Sig Dispense Refill    Ascorbic Acid (VITAMIN C-ANNA HIPS ER) 1000 MG TBCR Take 1 tablet by mouth daily      b complex vitamins capsule Take 1 capsule by mouth daily      Cholecalciferol (VITAMIN D PO) Take 4,000 Units by mouth daily      losartan (COZAAR) 50 mg tablet Take 1 tablet (50 mg total) by mouth daily 90 tablet 3    Magnesium 200 MG TABS Take 200 mg by mouth daily      Multiple Vitamin (MULTIVITAMINS PO) Take by mouth      venlafaxine (EFFEXOR-XR) 150 mg 24 hr capsule Take 1 capsule (150 mg total) by mouth daily 90 capsule 3     No current facility-administered medications for this visit  Allergies   Allergen Reactions    Lisinopril Cough    Ace Inhibitors Cough     Category:  Adverse Reaction;       Immunizations:     Immunization History   Administered Date(s) Administered    Pneumococcal Polysaccharide PPV23 07/29/2021    SARS-CoV-2 / COVID-19 mRNA IM (Moderna) 04/19/2021, 05/19/2021    Tdap 06/02/2016, 06/02/2016    Tuberculin Skin Test-PPD Intradermal 07/16/2018      Health Maintenance:         Topic Date Due    HIV Screening  Never done    Colorectal Cancer Screening  09/18/2027    Hepatitis C Screening  Completed         Topic Date Due    Influenza Vaccine (1) 09/01/2021      Medicare Health Risk Assessment:     /88 (BP Location: Left arm, Patient Position: Sitting, Cuff Size: Adult)   Pulse 78   Temp (!) 96 8 °F (36 °C) (Temporal)   Resp 14   Ht 5' 3 58" (1 615 m)   Wt 106 kg (233 lb 12 8 oz) SpO2 98%   BMI 40 66 kg/m²      Pollo Brunson is here for her Subsequent Wellness visit  Last Medicare Wellness visit information reviewed, patient interviewed, no change since last AWV  Health Risk Assessment:   Patient rates overall health as good  Patient feels that their physical health rating is slightly worse  Patient is satisfied with their life  Eyesight was rated as same  Hearing was rated as same  Patient feels that their emotional and mental health rating is slightly worse  Patients states they are never, rarely angry  Patient states they are often unusually tired/fatigued  Pain experienced in the last 7 days has been none  Patient states that she has experienced no weight loss or gain in last 6 months  Depression Screening:   PHQ-2 Score: 0      Fall Risk Screening: In the past year, patient has experienced: no history of falling in past year      Urinary Incontinence Screening:   Patient has not leaked urine accidently in the last six months  Home Safety:  Patient does not have trouble with stairs inside or outside of their home  Patient has working smoke alarms and has working carbon monoxide detector  Home safety hazards include: none  Nutrition:   Current diet is Regular and Frequent junk food  Medications:   Patient is currently taking over-the-counter supplements  OTC medications include: see medication list  Patient is able to manage medications  Activities of Daily Living (ADLs)/Instrumental Activities of Daily Living (IADLs):   Walk and transfer into and out of bed and chair?: Yes  Dress and groom yourself?: Yes    Bathe or shower yourself?: Yes    Feed yourself?  Yes  Do your laundry/housekeeping?: Yes  Manage your money, pay your bills and track your expenses?: Yes  Make your own meals?: Yes    Do your own shopping?: Yes    Previous Hospitalizations:   Any hospitalizations or ED visits within the last 12 months?: No      Advance Care Planning:   Living will: Yes    Durable POA for healthcare: Yes    Advanced directive: Yes    End of Life Decisions reviewed with patient: Yes      PREVENTIVE SCREENINGS      Cardiovascular Screening:    General: Risks and Benefits Discussed    Due for: Lipid Panel      Diabetes Screening:     General: Risks and Benefits Discussed    Due for: Blood Glucose      Colorectal Cancer Screening:     General: Screening Current      Breast Cancer Screening:     General: Screening Current    Due for: Mammogram        Cervical Cancer Screening:    General: Screening Not Indicated      Osteoporosis Screening:    General: Risks and Benefits Discussed      Abdominal Aortic Aneurysm (AAA) Screening:        General: Screening Not Indicated      Lung Cancer Screening:     General: Screening Not Indicated      Hepatitis C Screening:    General: Screening Current and Screening Not Indicated    Hep C Screening Accepted: No     Screening, Brief Intervention, and Referral to Treatment (SBIRT)    Screening      Single Item Drug Screening:  How often have you used an illegal drug (including marijuana) or a prescription medication for non-medical reasons in the past year? never    Single Item Drug Screen Score: 0  Interpretation: Negative screen for possible drug use disorder  Physical Exam  Vitals and nursing note reviewed  Constitutional:       General: She is not in acute distress  Appearance: Normal appearance  She is well-developed  She is obese  HENT:      Head: Normocephalic and atraumatic  Right Ear: Tympanic membrane normal       Left Ear: Tympanic membrane normal    Eyes:      Pupils: Pupils are equal, round, and reactive to light  Neck:      Vascular: No carotid bruit  Cardiovascular:      Rate and Rhythm: Normal rate and regular rhythm  Heart sounds: No murmur heard  Pulmonary:      Effort: Pulmonary effort is normal  No respiratory distress  Breath sounds: Normal breath sounds     Abdominal:      General: Bowel sounds are normal  Palpations: Abdomen is soft  Tenderness: There is no abdominal tenderness  Musculoskeletal:      Cervical back: Neck supple  Comments: Negative SLR negative F ABR slightly tender nose over the left SI area slight decreased range of motion in side bending with rotation   Skin:     General: Skin is warm and dry  Neurological:      Mental Status: She is alert and oriented to person, place, and time  Psychiatric:         Mood and Affect: Mood normal          Behavior: Behavior normal          Thought Content:  Thought content normal          Judgment: Judgment normal          Oscar Pollack DO

## 2021-07-29 NOTE — PATIENT INSTRUCTIONS
Medicare Preventive Visit Patient Instructions  Thank you for completing your Welcome to Medicare Visit or Medicare Annual Wellness Visit today  Your next wellness visit will be due in one year (7/30/2022)  The screening/preventive services that you may require over the next 5-10 years are detailed below  Some tests may not apply to you based off risk factors and/or age  Screening tests ordered at today's visit but not completed yet may show as past due  Also, please note that scanned in results may not display below  Preventive Screenings:  Service Recommendations Previous Testing/Comments   Colorectal Cancer Screening  * Colonoscopy    * Fecal Occult Blood Test (FOBT)/Fecal Immunochemical Test (FIT)  * Fecal DNA/Cologuard Test  * Flexible Sigmoidoscopy Age: 54-65 years old   Colonoscopy: every 10 years (may be performed more frequently if at higher risk)  OR  FOBT/FIT: every 1 year  OR  Cologuard: every 3 years  OR  Sigmoidoscopy: every 5 years  Screening may be recommended earlier than age 48 if at higher risk for colorectal cancer  Also, an individualized decision between you and your healthcare provider will decide whether screening between the ages of 74-80 would be appropriate  Colonoscopy: 09/18/2017  FOBT/FIT: Not on file  Cologuard: Not on file  Sigmoidoscopy: Not on file    Screening Current     Breast Cancer Screening Age: 36 years old  Frequency: every 1-2 years  Not required if history of left and right mastectomy Mammogram: 12/24/2019    Screening Current   Cervical Cancer Screening Between the ages of 21-29, pap smear recommended once every 3 years  Between the ages of 33-67, can perform pap smear with HPV co-testing every 5 years     Recommendations may differ for women with a history of total hysterectomy, cervical cancer, or abnormal pap smears in past  Pap Smear: Not on file    Screening Not Indicated   Hepatitis C Screening Once for adults born between 1945 and 1965  More frequently in pregnancy  Immunizations:  Immunization Recommendations   Influenza Vaccine Annual influenza vaccination during flu season is recommended for all persons aged >= 6 months who do not have contraindications   Pneumococcal Vaccine (Prevnar and Pneumovax)  * Prevnar = PCV13  * Pneumovax = PPSV23   Adults 25-60 years old: 1-3 doses may be recommended based on certain risk factors  Adults 72 years old: Prevnar (PCV13) vaccine recommended followed by Pneumovax (PPSV23) vaccine  If already received PPSV23 since turning 65, then PCV13 recommended at least one year after PPSV23 dose  Hepatitis B Vaccine 3 dose series if at intermediate or high risk (ex: diabetes, end stage renal disease, liver disease)   Tetanus (Td) Vaccine - COST NOT COVERED BY MEDICARE PART B Following completion of primary series, a booster dose should be given every 10 years to maintain immunity against tetanus  Td may also be given as tetanus wound prophylaxis  Tdap Vaccine - COST NOT COVERED BY MEDICARE PART B Recommended at least once for all adults  For pregnant patients, recommended with each pregnancy  Shingles Vaccine (Shingrix) - COST NOT COVERED BY MEDICARE PART B  2 shot series recommended in those aged 48 and above     Health Maintenance Due:      Topic Date Due    HIV Screening  Never done    Colorectal Cancer Screening  09/18/2027    Hepatitis C Screening  Completed     Immunizations Due:      Topic Date Due    Pneumococcal Vaccine: 65+ Years (1 of 1 - PPSV23) Never done    Influenza Vaccine (1) 09/01/2021     Advance Directives   What are advance directives? Advance directives are legal documents that state your wishes and plans for medical care  These plans are made ahead of time in case you lose your ability to make decisions for yourself  Advance directives can apply to any medical decision, such as the treatments you want, and if you want to donate organs  What are the types of advance directives?   There are many types of advance directives, and each state has rules about how to use them  You may choose a combination of any of the following:  · Living will: This is a written record of the treatment you want  You can also choose which treatments you do not want, which to limit, and which to stop at a certain time  This includes surgery, medicine, IV fluid, and tube feedings  · Durable power of  for healthcare [facility]): This is a written record that states who you want to make healthcare choices for you when you are unable to make them for yourself  This person, called a proxy, is usually a family member or a friend  You may choose more than 1 proxy  · Do not resuscitate (DNR) order:  A DNR order is used in case your heart stops beating or you stop breathing  It is a request not to have certain forms of treatment, such as CPR  A DNR order may be included in other types of advance directives  · Medical directive: This covers the care that you want if you are in a coma, near death, or unable to make decisions for yourself  You can list the treatments you want for each condition  Treatment may include pain medicine, surgery, blood transfusions, dialysis, IV or tube feedings, and a ventilator (breathing machine)  · Values history: This document has questions about your views, beliefs, and how you feel and think about life  This information can help others choose the care that you would choose  Why are advance directives important? An advance directive helps you control your care  Although spoken wishes may be used, it is better to have your wishes written down  Spoken wishes can be misunderstood, or not followed  Treatments may be given even if you do not want them  An advance directive may make it easier for your family to make difficult choices about your care     Weight Management   Why it is important to manage your weight:  Being overweight increases your risk of health conditions such as heart disease, high blood pressure, type 2 diabetes, and certain types of cancer  It can also increase your risk for osteoarthritis, sleep apnea, and other respiratory problems  Aim for a slow, steady weight loss  Even a small amount of weight loss can lower your risk of health problems  How to lose weight safely:  A safe and healthy way to lose weight is to eat fewer calories and get regular exercise  You can lose up about 1 pound a week by decreasing the number of calories you eat by 500 calories each day  Healthy meal plan for weight management:  A healthy meal plan includes a variety of foods, contains fewer calories, and helps you stay healthy  A healthy meal plan includes the following:  · Eat whole-grain foods more often  A healthy meal plan should contain fiber  Fiber is the part of grains, fruits, and vegetables that is not broken down by your body  Whole-grain foods are healthy and provide extra fiber in your diet  Some examples of whole-grain foods are whole-wheat breads and pastas, oatmeal, brown rice, and bulgur  · Eat a variety of vegetables every day  Include dark, leafy greens such as spinach, kale, candice greens, and mustard greens  Eat yellow and orange vegetables such as carrots, sweet potatoes, and winter squash  · Eat a variety of fruits every day  Choose fresh or canned fruit (canned in its own juice or light syrup) instead of juice  Fruit juice has very little or no fiber  · Eat low-fat dairy foods  Drink fat-free (skim) milk or 1% milk  Eat fat-free yogurt and low-fat cottage cheese  Try low-fat cheeses such as mozzarella and other reduced-fat cheeses  · Choose meat and other protein foods that are low in fat  Choose beans or other legumes such as split peas or lentils  Choose fish, skinless poultry (chicken or turkey), or lean cuts of red meat (beef or pork)  Before you cook meat or poultry, cut off any visible fat  · Use less fat and oil  Try baking foods instead of frying them   Add less fat, such as margarine, sour cream, regular salad dressing and mayonnaise to foods  Eat fewer high-fat foods  Some examples of high-fat foods include french fries, doughnuts, ice cream, and cakes  · Eat fewer sweets  Limit foods and drinks that are high in sugar  This includes candy, cookies, regular soda, and sweetened drinks  Exercise:  Exercise at least 30 minutes per day on most days of the week  Some examples of exercise include walking, biking, dancing, and swimming  You can also fit in more physical activity by taking the stairs instead of the elevator or parking farther away from stores  Ask your healthcare provider about the best exercise plan for you  © Copyright [employer] 2018 Information is for End User's use only and may not be sold, redistributed or otherwise used for commercial purposes   All illustrations and images included in CareNotes® are the copyrighted property of [employer]  or [address] [place] [address]

## 2021-08-02 ENCOUNTER — OFFICE VISIT (OUTPATIENT)
Dept: FAMILY MEDICINE CLINIC | Facility: CLINIC | Age: 66
End: 2021-08-02
Payer: COMMERCIAL

## 2021-08-02 ENCOUNTER — HOSPITAL ENCOUNTER (OUTPATIENT)
Dept: CT IMAGING | Facility: HOSPITAL | Age: 66
Discharge: HOME | End: 2021-08-02
Payer: COMMERCIAL

## 2021-08-02 VITALS
OXYGEN SATURATION: 99 % | HEART RATE: 80 BPM | HEIGHT: 64 IN | RESPIRATION RATE: 14 BRPM | DIASTOLIC BLOOD PRESSURE: 100 MMHG | SYSTOLIC BLOOD PRESSURE: 156 MMHG | BODY MASS INDEX: 39.81 KG/M2 | TEMPERATURE: 96.1 F | WEIGHT: 233.2 LBS

## 2021-08-02 DIAGNOSIS — I10 ESSENTIAL HYPERTENSION: Chronic | ICD-10-CM

## 2021-08-02 DIAGNOSIS — M54.50 LOW BACK PAIN, UNSPECIFIED BACK PAIN LATERALITY, UNSPECIFIED CHRONICITY, UNSPECIFIED WHETHER SCIATICA PRESENT: ICD-10-CM
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
## 2021-08-02 DIAGNOSIS — R51.9 NONINTRACTABLE HEADACHE, UNSPECIFIED CHRONICITY PATTERN, UNSPECIFIED HEADACHE TYPE: ICD-10-CM

## 2021-08-02 DIAGNOSIS — F41.9 ANXIETY: ICD-10-CM
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
## 2021-08-02 DIAGNOSIS — M54.2 CERVICAL PAIN (NECK): ICD-10-CM

## 2021-08-02 PROCEDURE — 99214 OFFICE O/P EST MOD 30 MIN: CPT | Performed by: FAMILY MEDICINE

## 2021-08-02 PROCEDURE — G1004 CDSM NDSC: HCPCS

## 2021-08-02 PROCEDURE — 70450 CT HEAD/BRAIN W/O DYE: CPT
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
## 2021-08-17 ENCOUNTER — TELEPHONE (OUTPATIENT)
Dept: FAMILY MEDICINE CLINIC | Facility: CLINIC | Age: 66
End: 2021-08-17

## 2021-08-17 NOTE — TELEPHONE ENCOUNTER
I spoke to the patient, she states her swelling is improving since her July visit  If it worsens again she will call us for sooner follow up

## 2021-08-17 NOTE — TELEPHONE ENCOUNTER
----- Message from [address] sent at 8/17/2021  4:25 PM EDT -----  Regarding: FW: Visit Follow-Up Question  Contact: [phone]  Please notify patient that if the swelling is new, I would recommend f/u in the mean time  8/27 is fine to keep at this time for the stress test  ----- Message -----  From: Himanshu Parker  Sent: 8/17/2021  12:22 PM EDT  To: SHEFALI Gant  Subject: FW: Visit Follow-Up Question                       ----- Message -----  From: Dedra Mccormick  Sent: 8/15/2021   8:24 PM EDT  To: [facility]  Subject: Visit Follow-Up Question                         I called Central Scheduling and the first available date for the stress test is August 27th  I'm just following up to my 7/29/21 visit to say that my feet/ankles are starting to swell  Do you think it will be ok to wait until 8/27?

## 2021-08-23 LAB
ALBUMIN SERPL-MCNC: 4.3 G/DL (ref 3.6–5.1)
ALBUMIN/GLOB SERPL: 2 (CALC) (ref 1–2.5)
ALP SERPL-CCNC: 77 U/L (ref 37–153)
ALT SERPL-CCNC: 35 U/L (ref 6–29)
AST SERPL-CCNC: 21 U/L (ref 10–35)
BILIRUB SERPL-MCNC: 0.6 MG/DL (ref 0.2–1.2)
BUN SERPL-MCNC: 13 MG/DL (ref 7–25)
BUN/CREAT SERPL: ABNORMAL (CALC) (ref 6–22)
CALCIUM SERPL-MCNC: 9.2 MG/DL (ref 8.6–10.4)
CHLORIDE SERPL-SCNC: 104 MMOL/L (ref 98–110)
CHOLEST SERPL-MCNC: 192 MG/DL
CHOLEST/HDLC SERPL: 3.9 (CALC)
CO2 SERPL-SCNC: 29 MMOL/L (ref 20–32)
CORTIS AM PEAK SERPL-MCNC: 8.5 MCG/DL
CREAT SERPL-MCNC: 0.73 MG/DL (ref 0.5–0.99)
EST. AVERAGE GLUCOSE BLD GHB EST-MCNC: 91 (CALC)
EST. AVERAGE GLUCOSE BLD GHB EST-SCNC: 5 (CALC)
GLOBULIN SER CALC-MCNC: 2.2 G/DL (CALC) (ref 1.9–3.7)
GLUCOSE SERPL-MCNC: 102 MG/DL (ref 65–99)
HBA1C MFR BLD: 4.8 % OF TOTAL HGB
HDLC SERPL-MCNC: 49 MG/DL
LDLC SERPL CALC-MCNC: 119 MG/DL (CALC)
NONHDLC SERPL-MCNC: 143 MG/DL (CALC)
POTASSIUM SERPL-SCNC: 4.3 MMOL/L (ref 3.5–5.3)
PROT SERPL-MCNC: 6.5 G/DL (ref 6.1–8.1)
SL AMB EGFR AFRICAN AMERICAN: 100 ML/MIN/1.73M2
SL AMB EGFR NON AFRICAN AMERICAN: 86 ML/MIN/1.73M2
SODIUM SERPL-SCNC: 140 MMOL/L (ref 135–146)
T4 FREE SERPL-MCNC: 1.3 NG/DL (ref 0.8–1.8)
TRIGL SERPL-MCNC: 129 MG/DL
TSH SERPL-ACNC: 1.87 MIU/L (ref 0.4–4.5)

## 2021-08-24 ENCOUNTER — TELEPHONE (OUTPATIENT)
Dept: FAMILY MEDICINE CLINIC | Facility: CLINIC | Age: 66
End: 2021-08-24

## 2021-08-24 NOTE — TELEPHONE ENCOUNTER
I spoke to the patient requesting her account number and phone number on the bill so I can further investigate  She is going to call me with this information

## 2021-08-24 NOTE — TELEPHONE ENCOUNTER
I spoke to the patient, she states after doing some research it is an allowable service, $84 00 is being applied to her deductible  She is new to Medicare and has not yet met her deductible

## 2021-08-24 NOTE — TELEPHONE ENCOUNTER
----- Message from Sanjana Mason DO sent at 7/28/1635 10:58 AM EDT -----  Regarding: FW: Prescription Question  Contact: [phone]  Chest x-ray was ordered for the symptom of shortness of breath  DON'T understand why it was considered not medically necessary-----what codes to they knee?  ----- Message -----  From: Tierra Thomson  Sent: 8/23/2021  10:11 AM EDT  To: Sanjana Mason DO  Subject: FW: Prescription Question                          ----- Message -----  From: Tomasita Spurling  Sent: 8/23/2021   9:39 AM EDT  To: [facility]  Subject: Prescription Question                            On 7/29/21 Dr Timur Ellison prescribed chest x-ray because I'm having trouble breathing (still am)  Had the x-ray on 7/29  Medicare denied it saying it was medically unnecessary so, of course, Елена, my supplemental, refused to pay it  I'm looking at a bill of $629 for x-ray and $45 for radiologist  Jamshid Watkins appreciate if someone would check if this could be a medical coding issue  I had the x-ray at [address] [place] [address] on [facility]  Thank you  I'm having a stress echo on 8/30 and I had my blood drawn today per Rx; my  stress is up hoping Medicare pays (all new to me)    Sybil Ocasio

## 2021-08-24 NOTE — TELEPHONE ENCOUNTER
Ozella Larsson called the office specifically  asking to speak to you   I did offer to help, but requested you please call her back at [phone]

## 2021-08-27 ENCOUNTER — HOSPITAL ENCOUNTER (OUTPATIENT)
Dept: NON INVASIVE DIAGNOSTICS | Facility: HOSPITAL | Age: 66
Discharge: HOME | End: 2021-08-27
Payer: MEDICARE

## 2021-08-27 DIAGNOSIS — R07.89 CHEST PRESSURE: ICD-10-CM

## 2021-08-27 DIAGNOSIS — R06.02 SOBOE (SHORTNESS OF BREATH ON EXERTION): ICD-10-CM

## 2021-08-27 LAB
ARRHY DURING EX: NORMAL
CHEST PAIN STATEMENT: NORMAL
MAX DIASTOLIC BP: 108 MMHG
MAX HEART RATE: 146 BPM
MAX PREDICTED HEART RATE: 155 BPM
MAX. SYSTOLIC BP: 182 MMHG
PROTOCOL NAME: NORMAL
TARGET HR FORMULA: NORMAL
TEST INDICATION: NORMAL
TIME IN EXERCISE PHASE: NORMAL

## 2021-08-27 PROCEDURE — 93350 STRESS TTE ONLY: CPT

## 2021-08-27 PROCEDURE — 93351 STRESS TTE COMPLETE: CPT

## 2021-09-07 ENCOUNTER — HOSPITAL ENCOUNTER (OUTPATIENT)
Dept: ULTRASOUND IMAGING | Facility: HOSPITAL | Age: 66
Discharge: HOME | End: 2021-09-07
Payer: MEDICARE

## 2021-09-07 DIAGNOSIS — E04.1 NONTOXIC SINGLE THYROID NODULE: ICD-10-CM

## 2021-09-07 PROCEDURE — 76536 US EXAM OF HEAD AND NECK: CPT

## 2021-09-10 ENCOUNTER — TELEPHONE (OUTPATIENT)
Dept: FAMILY MEDICINE CLINIC | Facility: CLINIC | Age: 66
End: 2021-09-10

## 2021-09-10 NOTE — TELEPHONE ENCOUNTER
Pt called the office asking if her thyroid US results from 09/07/21 are yet back  Nothing is in My chart  I informed pt the results are not yet back once they are in  and reviewed someone from the office will call her

## 2021-10-04 ENCOUNTER — CONSULT (OUTPATIENT)
Dept: CARDIOLOGY CLINIC | Facility: CLINIC | Age: 66
End: 2021-10-04
Payer: MEDICARE

## 2021-10-04 VITALS
WEIGHT: 234 LBS | DIASTOLIC BLOOD PRESSURE: 92 MMHG | HEIGHT: 64 IN | SYSTOLIC BLOOD PRESSURE: 138 MMHG | BODY MASS INDEX: 39.95 KG/M2 | RESPIRATION RATE: 16 BRPM | HEART RATE: 74 BPM

## 2021-10-04 DIAGNOSIS — R00.2 PALPITATION: ICD-10-CM

## 2021-10-04 DIAGNOSIS — R94.39 ABNORMAL STRESS TEST: Primary | ICD-10-CM

## 2021-10-04 DIAGNOSIS — R06.83 SNORING: ICD-10-CM

## 2021-10-04 DIAGNOSIS — G47.10 DAYTIME HYPERSOMNIA: ICD-10-CM

## 2021-10-04 DIAGNOSIS — R06.02 SOBOE (SHORTNESS OF BREATH ON EXERTION): ICD-10-CM

## 2021-10-04 DIAGNOSIS — R07.89 CHEST PRESSURE: ICD-10-CM

## 2021-10-04 PROCEDURE — 93000 ELECTROCARDIOGRAM COMPLETE: CPT | Performed by: INTERNAL MEDICINE

## 2021-10-04 PROCEDURE — 99204 OFFICE O/P NEW MOD 45 MIN: CPT | Performed by: INTERNAL MEDICINE

## 2021-10-04 RX ORDER — METOPROLOL TARTRATE 50 MG/1
TABLET, FILM COATED ORAL
Qty: 1 TABLET | Refills: 0 | Status: SHIPPED | OUTPATIENT
Start: 2021-10-04 | End: 2022-03-22

## 2021-10-07 ENCOUNTER — TELEPHONE (OUTPATIENT)
Dept: SLEEP CENTER | Facility: CLINIC | Age: 66
End: 2021-10-07

## 2021-10-18 ENCOUNTER — HOSPITAL ENCOUNTER (OUTPATIENT)
Dept: NON INVASIVE DIAGNOSTICS | Facility: HOSPITAL | Age: 66
Discharge: HOME | End: 2021-10-18
Attending: INTERNAL MEDICINE
Payer: MEDICARE

## 2021-10-18 DIAGNOSIS — R00.2 PALPITATION: ICD-10-CM

## 2021-10-18 PROCEDURE — 93226 XTRNL ECG REC<48 HR SCAN A/R: CPT

## 2021-10-18 PROCEDURE — 93225 XTRNL ECG REC<48 HRS REC: CPT

## 2021-10-24 PROCEDURE — 93227 XTRNL ECG REC<48 HR R&I: CPT | Performed by: INTERNAL MEDICINE

## 2021-10-26 NOTE — NURSING NOTE
I have reviewed discharge instructions with the patient. The patient verbalized understanding. Patient left ED via Discharge Method: ambulatory to Home with self. Opportunity for questions and clarification provided. Patient given 3 scripts. To continue your aftercare when you leave the hospital, you may receive an automated call from our care team to check in on how you are doing. This is a free service and part of our promise to provide the best care and service to meet your aftercare needs.  If you have questions, or wish to unsubscribe from this service please call [phone]. Thank you for Choosing our [facility] Emergency Department. Spoke with mitchel Arreguin PA for Dr Diego Parisi regarding patient being on lovenox and if will continue on discharge to home  Margoth Emery was questioning who ordered lovenox on [facility] admission, she stated that they do not put them on this and only use asa 81 mg  Bid as prophylaxis on their patients  I did speak with Marjan Jaime NP and she will discontinue the lovenox

## 2021-11-26 ENCOUNTER — HOSPITAL ENCOUNTER (OUTPATIENT)
Dept: SLEEP CENTER | Facility: CLINIC | Age: 66
Discharge: HOME | End: 2021-11-26
Payer: MEDICARE

## 2021-11-26 DIAGNOSIS — R06.83 SNORING: ICD-10-CM

## 2021-11-26 DIAGNOSIS — G47.10 DAYTIME HYPERSOMNIA: ICD-10-CM

## 2021-11-26 PROCEDURE — 95810 POLYSOM 6/> YRS 4/> PARAM: CPT

## 2021-11-29 ENCOUNTER — TELEPHONE (OUTPATIENT)
Dept: SLEEP CENTER | Facility: CLINIC | Age: 66
End: 2021-11-29

## 2021-12-01 ENCOUNTER — TELEPHONE (OUTPATIENT)
Dept: FAMILY MEDICINE CLINIC | Facility: CLINIC | Age: 66
End: 2021-12-01

## 2021-12-06 ENCOUNTER — APPOINTMENT (OUTPATIENT)
Dept: LAB | Facility: MEDICAL CENTER | Age: 66
End: 2021-12-06
Payer: MEDICARE

## 2021-12-06 ENCOUNTER — TELEPHONE (OUTPATIENT)
Dept: RADIOLOGY | Facility: HOSPITAL | Age: 66
End: 2021-12-06

## 2021-12-06 DIAGNOSIS — I10 HTN (HYPERTENSION), BENIGN: Primary | ICD-10-CM

## 2021-12-06 DIAGNOSIS — I10 HTN (HYPERTENSION), BENIGN: ICD-10-CM

## 2021-12-06 LAB
ANION GAP SERPL CALCULATED.3IONS-SCNC: 7 MMOL/L (ref 4–13)
BUN SERPL-MCNC: 14 MG/DL (ref 5–25)
CALCIUM SERPL-MCNC: 9.2 MG/DL (ref 8.3–10.1)
CHLORIDE SERPL-SCNC: 103 MMOL/L (ref 100–108)
CO2 SERPL-SCNC: 28 MMOL/L (ref 21–32)
CREAT SERPL-MCNC: 0.76 MG/DL (ref 0.6–1.3)
GFR SERPL CREATININE-BSD FRML MDRD: 83 ML/MIN/1.73SQ M
GLUCOSE SERPL-MCNC: 95 MG/DL (ref 65–140)
POTASSIUM SERPL-SCNC: 4.1 MMOL/L (ref 3.5–5.3)
SODIUM SERPL-SCNC: 138 MMOL/L (ref 136–145)

## 2021-12-06 PROCEDURE — 36415 COLL VENOUS BLD VENIPUNCTURE: CPT

## 2021-12-06 PROCEDURE — 80048 BASIC METABOLIC PNL TOTAL CA: CPT

## 2021-12-07 ENCOUNTER — HOSPITAL ENCOUNTER (OUTPATIENT)
Dept: RADIOLOGY | Facility: HOSPITAL | Age: 66
Discharge: HOME | End: 2021-12-07
Attending: INTERNAL MEDICINE
Payer: MEDICARE

## 2021-12-07 VITALS
SYSTOLIC BLOOD PRESSURE: 147 MMHG | RESPIRATION RATE: 20 BRPM | OXYGEN SATURATION: 97 % | DIASTOLIC BLOOD PRESSURE: 99 MMHG | HEART RATE: 64 BPM

## 2021-12-07 DIAGNOSIS — R94.39 ABNORMAL STRESS TEST: ICD-10-CM

## 2021-12-07 DIAGNOSIS — R06.02 SOBOE (SHORTNESS OF BREATH ON EXERTION): ICD-10-CM

## 2021-12-07 PROCEDURE — 75574 CT ANGIO HRT W/3D IMAGE: CPT

## 2021-12-07 PROCEDURE — G1004 CDSM NDSC: HCPCS

## 2021-12-07 RX ORDER — NITROGLYCERIN 0.4 MG/1
0.4 TABLET SUBLINGUAL
Status: COMPLETED | OUTPATIENT
Start: 2021-12-07 | End: 2021-12-07

## 2021-12-07 RX ADMIN — IODIXANOL 100 ML: 320 INJECTION, SOLUTION INTRAVASCULAR at 13:29

## 2021-12-07 RX ADMIN — NITROGLYCERIN 0.4 MG: 0.4 TABLET SUBLINGUAL at 13:13

## 2021-12-10 ENCOUNTER — TELEPHONE (OUTPATIENT)
Dept: CARDIOLOGY CLINIC | Facility: CLINIC | Age: 66
End: 2021-12-10

## 2021-12-13 ENCOUNTER — CLINICAL SUPPORT (OUTPATIENT)
Dept: FAMILY MEDICINE CLINIC | Facility: CLINIC | Age: 66
End: 2021-12-13
Payer: MEDICARE

## 2021-12-13 VITALS — TEMPERATURE: 97 F

## 2021-12-13 DIAGNOSIS — Z23 ENCOUNTER FOR IMMUNIZATION: Primary | ICD-10-CM

## 2021-12-13 DIAGNOSIS — I10 ESSENTIAL HYPERTENSION: ICD-10-CM

## 2021-12-13 PROCEDURE — 90471 IMMUNIZATION ADMIN: CPT

## 2021-12-13 PROCEDURE — 90715 TDAP VACCINE 7 YRS/> IM: CPT

## 2021-12-13 RX ORDER — AMLODIPINE BESYLATE 2.5 MG/1
2.5 TABLET ORAL DAILY
Qty: 30 TABLET | Refills: 5 | Status: SHIPPED | OUTPATIENT
Start: 2021-12-13 | End: 2022-03-22

## 2021-12-20 ENCOUNTER — IMMUNIZATIONS (OUTPATIENT)
Dept: FAMILY MEDICINE CLINIC | Facility: CLINIC | Age: 66
End: 2021-12-20
Payer: MEDICARE

## 2021-12-20 ENCOUNTER — TELEPHONE (OUTPATIENT)
Dept: FAMILY MEDICINE CLINIC | Facility: CLINIC | Age: 66
End: 2021-12-20

## 2021-12-20 DIAGNOSIS — Z23 ENCOUNTER FOR IMMUNIZATION: Primary | ICD-10-CM

## 2021-12-20 PROCEDURE — G0008 ADMIN INFLUENZA VIRUS VAC: HCPCS

## 2021-12-20 PROCEDURE — 90662 IIV NO PRSV INCREASED AG IM: CPT

## 2021-12-20 NOTE — TELEPHONE ENCOUNTER
Patient came into office today for flu shot and stated Dr Rich Dewitt requested patient get BP checked while in office  Patients BP was 124/84  Patient states she has been taking medication recently prescribed Amlodipine 2 5 mg since 12/15/2021

## 2022-01-20 DIAGNOSIS — F41.9 ANXIETY: ICD-10-CM

## 2022-01-20 DIAGNOSIS — I10 BENIGN ESSENTIAL HYPERTENSION: ICD-10-CM

## 2022-01-20 RX ORDER — LOSARTAN POTASSIUM 50 MG/1
TABLET ORAL
Qty: 90 TABLET | Refills: 3 | Status: SHIPPED | OUTPATIENT
Start: 2022-01-20

## 2022-01-22 RX ORDER — VENLAFAXINE HYDROCHLORIDE 150 MG/1
CAPSULE, EXTENDED RELEASE ORAL
Qty: 90 CAPSULE | Refills: 3 | Status: SHIPPED | OUTPATIENT
Start: 2022-01-22

## 2022-03-22 ENCOUNTER — OFFICE VISIT (OUTPATIENT)
Dept: FAMILY MEDICINE CLINIC | Facility: CLINIC | Age: 67
End: 2022-03-22
Payer: MEDICARE

## 2022-03-22 VITALS
BODY MASS INDEX: 40.04 KG/M2 | HEIGHT: 63 IN | DIASTOLIC BLOOD PRESSURE: 78 MMHG | WEIGHT: 226 LBS | TEMPERATURE: 96.3 F | SYSTOLIC BLOOD PRESSURE: 128 MMHG | OXYGEN SATURATION: 99 % | HEART RATE: 77 BPM

## 2022-03-22 DIAGNOSIS — G47.33 OSA (OBSTRUCTIVE SLEEP APNEA): ICD-10-CM

## 2022-03-22 DIAGNOSIS — I10 ESSENTIAL HYPERTENSION: Primary | ICD-10-CM

## 2022-03-22 DIAGNOSIS — E04.1 NONTOXIC SINGLE THYROID NODULE: ICD-10-CM

## 2022-03-22 PROCEDURE — 99214 OFFICE O/P EST MOD 30 MIN: CPT | Performed by: FAMILY MEDICINE

## 2022-03-22 RX ORDER — AMLODIPINE BESYLATE 2.5 MG/1
2.5 TABLET ORAL DAILY
Qty: 90 TABLET | Refills: 1 | Status: SHIPPED | OUTPATIENT
Start: 2022-03-22

## 2022-03-22 NOTE — PROGRESS NOTES
Assessment/Plan:       Diagnoses and all orders for this visit:    Essential hypertension  -     amLODIPine (NORVASC) 2 5 mg tablet; Take 1 tablet (2 5 mg total) by mouth daily    KENY (obstructive sleep apnea)    Nontoxic single thyroid nodule  Comments:  Repeat ultrasound 2023          Subjective:   Chief Complaint   Patient presents with    Follow-up     bp check       Patient ID: Amanda Salazar is a 73 y o  female  61-year-old female here for blood pressure recheck  She is still full-time employed working at [employer]  Very busy season now with taxes  She remains on the amlodipine tolerating well with good blood pressure numbers  She was down in [place] to visit with her new twin grandsons  Patient is you lab work is in August 2022  Mammography overdue  Ultrasound thyroid probably can be repeated at 2 year interval   Last with stable  Patient had CTA in December 2021 the coronary calcium score 16 and no restrictive coronary artery disease  Patient is a sleep specialist follow-up later this week  Sleep study done in October did show moderate obstructive sleep apnea follow-up titration study was recommended  The following portions of the patient's history were reviewed and updated as appropriate: allergies, current medications, past family history, past medical history, past social history, past surgical history and problem list       Review of Systems   HENT: Negative for congestion  Respiratory: Negative for shortness of breath  Cardiovascular: Negative for chest pain  Genitourinary: Menstrual problem:  postmenopausal    Musculoskeletal: Back pain: Stable  Neurological: Negative for syncope and light-headedness  Objective:      /78   Pulse 77   Temp (!) 96 3 °F (35 7 °C) (Temporal)   Ht 5' 3" (1 6 m)   Wt 103 kg (226 lb)   SpO2 99%   BMI 40 03 kg/m²          Physical Exam  Constitutional:       General: She is not in acute distress       Appearance: Normal appearance  She is well-developed  Comments: 58-year-old female who appears younger than her stated age with elevated BMI   HENT:      Head: Normocephalic  Right Ear: Tympanic membrane normal       Nose: Nose normal       Mouth/Throat:      Mouth: Mucous membranes are moist    Eyes:      Conjunctiva/sclera: Conjunctivae normal       Pupils: Pupils are equal, round, and reactive to light  Neck:      Vascular: No carotid bruit  Cardiovascular:      Rate and Rhythm: Normal rate and regular rhythm  Heart sounds: No murmur heard  Pulmonary:      Effort: Pulmonary effort is normal       Breath sounds: Normal breath sounds  Abdominal:      General: Bowel sounds are normal       Palpations: Abdomen is soft  There is no mass  Tenderness: There is no abdominal tenderness  Comments: No abdominal bruit   Musculoskeletal:         General: Normal range of motion  Cervical back: Normal range of motion and neck supple  Skin:     General: Skin is warm and dry  Neurological:      Mental Status: She is alert and oriented to person, place, and time  Deep Tendon Reflexes: Reflexes are normal and symmetric  Psychiatric:         Mood and Affect: Mood normal          Behavior: Behavior normal          Thought Content:  Thought content normal          Judgment: Judgment normal

## 2022-03-25 ENCOUNTER — OFFICE VISIT (OUTPATIENT)
Dept: SLEEP CENTER | Facility: CLINIC | Age: 67
End: 2022-03-25
Payer: MEDICARE

## 2022-03-25 VITALS
HEIGHT: 63 IN | BODY MASS INDEX: 40.22 KG/M2 | SYSTOLIC BLOOD PRESSURE: 152 MMHG | WEIGHT: 227 LBS | DIASTOLIC BLOOD PRESSURE: 82 MMHG | HEART RATE: 66 BPM

## 2022-03-25 DIAGNOSIS — G47.33 OSA (OBSTRUCTIVE SLEEP APNEA): Primary | ICD-10-CM

## 2022-03-25 DIAGNOSIS — R00.2 PALPITATIONS: ICD-10-CM

## 2022-03-25 DIAGNOSIS — G47.61 PLMD (PERIODIC LIMB MOVEMENT DISORDER): ICD-10-CM

## 2022-03-25 DIAGNOSIS — M54.50 CHRONIC BILATERAL LOW BACK PAIN WITHOUT SCIATICA: ICD-10-CM

## 2022-03-25 DIAGNOSIS — G47.10 DAYTIME HYPERSOMNIA: ICD-10-CM

## 2022-03-25 DIAGNOSIS — G89.29 CHRONIC BILATERAL LOW BACK PAIN WITHOUT SCIATICA: ICD-10-CM

## 2022-03-25 DIAGNOSIS — I10 ESSENTIAL HYPERTENSION: ICD-10-CM

## 2022-03-25 DIAGNOSIS — F45.8 BRUXISM: ICD-10-CM

## 2022-03-25 PROCEDURE — 99204 OFFICE O/P NEW MOD 45 MIN: CPT | Performed by: INTERNAL MEDICINE

## 2022-03-25 NOTE — PATIENT INSTRUCTIONS
What is KENY? Obstructive sleep apnea is a common and serious sleep disorder that causes you to stop breathing during sleep  The airway repeatedly becomes blocked, limiting the amount of air that reaches your lungs  When this happens, you may snore loudly or making choking noises as you try to breathe  Your brain and body becomes oxygen deprived and you may wake up  This may happen a few times a night, or in more severe cases, several hundred times a night  Sleep apnea can make you wake up in the morning feeling tired or unrefreshed even though you have had a full night of sleep  During the day, you may feel fatigued, have difficulty concentrating or you may even unintentionally fall asleep  This is because your body is waking up numerous times throughout the night, even though you might not be conscious of each awakening  The lack of oxygen your body receives can have negative long-term consequences for your health  This includes:  · High blood pressure  · Heart disease  · Irregular heart rhythms with risk for cardiac death  · Stroke  · Pre-diabetes and diabetes  · Depression & risk for Dementia    Testing  An objective evaluation of your sleep may be needed before a sleep physician can make a diagnosis  Options include:   In-lab overnight sleep study - This study requires you to stay overnight at a sleep center, in a bed that may resemble a hotel room  You will sleep with sensors hooked up to various parts of your body  These sensors record your brain waves, heartbeat, breathing and movement  An overnight sleep study also provides your doctor with the most complete information about your sleep  Home sleep apnea test - Some patients with high risk factors for obstructive sleep apnea and no other medical disorders may be candidates for a home sleep apnea test  The testing equipment differs in that it is less complicated than what is used in an overnight sleep study   As such, does not give all the data an in-lab will and if negative, may not mean you do not have the problem  Treatment for sleep apnea  includes using a continuous positive airway pressure (CPAP) machine to keep your airway open during sleep  A mask is placed over your nose and mouth, or just your nose  The mask is hooked to the CPAP machine that blows a gentle stream of air into the mask when you breathe  This helps keep your airway open so you can breathe more regularly  Extra oxygen may be given to you through the machine  You may be given a mouth device  It looks like a mouth guard or dental retainer and stops your tongue and mouth tissues from blocking your throat while you sleep  Surgery may be needed to remove extra tissues that block your mouth, throat, or nose  Manage sleep apnea:   · Do not smoke  Nicotine and other chemicals in cigarettes and cigars can cause lung damage  Ask your healthcare provider for information if you currently smoke and need help to quit  E-cigarettes or smokeless tobacco still contain nicotine  Talk to your healthcare provider before you use these products  · Do not drink alcohol or take sedative medicine before you go to sleep  Alcohol and sedatives can relax the muscles and tissues around your throat  This can block the airflow to your lungs  · Maintain a healthy weight  Excess tissue around your throat may restrict your breathing  Ask your healthcare provider for information if you need to lose weight  · Sleep on your side or use pillows designed to prevent sleep apnea  This prevents your tongue or other tissues from blocking your throat  You can also raise the head of your bed  · Driving Safety  Refrain from driving when drowsy  Go to AASM website for more information: Sleepeducation  org       Nursing Support:  When: Monday through Friday 7A-5PM except holidays  Where: Our direct line is [phone]  If you are having a true emergency please call 911    In the event that the line is busy or it is after hours please leave a voice message and we will return your call  Please speak clearly, leaving your full name, birth date, best number to reach you and the reason for your call  Medication refills: We will need the name of the medication, the dosage, the ordering provider, whether you get a 30 or 90 day refill, and the pharmacy name and address  Medications will be ordered by the provider only  Nurses cannot call in prescriptions  Please allow 7 days for medication refills  Physician requested updates: If your provider requested that you call with an update after starting medication, please be ready to provide us the medication and dosage, what time you take your medication, the time you attempt to fall asleep, time you fall asleep, when you wake up, and what time you get out of bed  Sleep Study Results: We will contact you with sleep study results and/or next steps after the physician has reviewed your testing

## 2022-03-25 NOTE — PROGRESS NOTES
Consultation - [facility]  73 y o  female  ANGEL:  [facility]  DOS:3/25/2022    Physician Requesting Consult: Edelmira Nesbitt DO             Reason for Consult : At your kind request I saw [facility] for initial sleep evaluation today  Patient recently had a diagnostic sleep study and is here to review results / treatment options  The study demonstrated   obstructive sleep apnea: AHI  13 7 /hour     Severity may be under stated because she had no stage REM  Moderate intensity  snoring  was noted and there were also 6 respiratory effort-related arousal   Minimum oxygen saturation 89%   There was severe periodic limb movements of sleep for an index of 64 4 per hour  Sleep latency was short suggesting sleep deprivation  PFSH, Problem List, Medications & Allergies were reviewed in EMR  Jenna Beckwith  has a past medical history of ACE-inhibitor cough, Anxiety, Arthritis, Chronic pain disorder, Depression, Disease of thyroid gland, History of transfusion, Hypertension, and Wears glasses  She has a current medication list which includes the following prescription(s): amlodipine, losartan, and venlafaxine  HPI:  Study was undertaken because of her history of palpitations for which a cardiac workup out has been unrevealing  She sleeps alone, but has been told she snores and ongoing for many years  She is not aware of breathing difficulties  But "Tosses & Turns"  Other Complaints:  Daytime fatigue  Restless Leg Syndrome: reports no suggestive symptoms  Parasomnia: reports teeth grinding during sleep;   Sleep Routine (on average):   Typical Bedtime:9:30pm Gets OOB:5:30am  TIB:8 hrs  Sleep latency:<  30 minutes Sleep Interruptions:1-2/nite Is unsure of the cause and is able to fall back asleep  Awakens: with aid of an alarm  Upon awakening: usually feels refreshed    Jenna Beckwith reports occasional   Excessive Daytime Sleepiness feels like napping but does not have the opportunity   She rated herself at Total score: 10 /24 on the [place] Sleepiness Scale  Habits:  reports that she quit smoking about 32 years ago  She has never used smokeless tobacco  ;   E-Cigarette/Vaping    ;  reports no history of drug use ;  reports current alcohol use of about 3 0 standard drinks of alcohol per week  ; Caffeine use:excessiveuntil 7pm ; Exercise routine: none   Family History: Negative for sleep disturbance  ROS - reviewed and as attached  Significant for intentional LOW of 11 lbs  She has postnasal drip  She reported no other respiratory symptoms  Apart from random palpitations, she reported no other cardiac symptoms  She has feelings of anxiety and depression for which she is on Effexor  She reports back pain and radicular symptoms that occur very rarely  EXAM:  /82 (BP Location: Left arm, Patient Position: Sitting, Cuff Size: Large)   Pulse 66   Ht 5' 3" (1 6 m)   Wt 103 kg (227 lb)   BMI 40 21 kg/m²    General: Well groomed female, well appearing, in no apparent distress  Neurological: Alert and orientated;  cooperative; Cranial nerves intact;    Psychiatric: Speech:clear and coherent;  Normal mood, affect & thought   Skin: warm and dry; Color& Hydration good; no facial rashes or lesions   HEENT:  Craniofacial anatomy: normal Sinuses: non- tender  TMJ: Normal    Eyes: EOM's intact;  conjunctiva/corneas clear   Ears: Externallyappear normal     Nasal Airway: is patent and narrow nares Septum:intact; Mucosa: normal; Turbinates: normal; Rhinorrhea: None   Mouth: Lips: normal posture; Dentition: normal   Mucosa:moist  ; Hard Palate:narrow   Oropharryx: crowded and AP narrowing Tongue: Mallampati:Class IV, Mobile, Macroglossia and ScallopedSoft Palate:  redundant  Tonsils: absent  Neck:; Neck Circumference: 15 "; Supple; no abnormal masses;  Thyroid:normal  Trachea:central     Lymph: No Cervical or Submandibular Lymhadenopathy  Heart: S1,S2 normal; RRR; no gallop; no murmurs   Lungs: Respiratory Effort:normal  Air entry good bilaterally  No wheezes  No rales  Abdomen: Obese, Soft & non-tender    Extremities: No pedal edema  No clubbing or cyanosis  Musculoskeletal:  Motor normal; Gait:normal        IMPRESSION: Primary/Secondary Sleep Diagnoses (to Medical or Psych conditions) & Comorbidities   1  KENY (obstructive sleep apnea)  CPAP Study   2  PLMD (periodic limb movement disorder)     3  Bruxism     4  Daytime hypersomnia     5  Palpitations     6  Essential hypertension     7  Chronic bilateral low back pain without sciatica     8  BMI 40 0-44 9, adult ([facility] [address] )          PLAN:   1  I reviewed results of the Sleep study with the patient  2  With respect to above conditions, I counseled on pathophysiology, diagnosis, treatment options, risks and benefits; inter-relationship and effects on symptoms and comorbidities; risks of no treatment; costs and insurance aspects  3  Patient elected positive airway pressure therapy and is to be scheduled for a titration study  4  I also advised on weight reduction and ensuring adequate treatment of allergies  5  If periodic limb movements of sleep persists, she may benefit from an alternate to Effexor, that may underlie the PLMS  5  Follow-up to be scheduled after the study to review results and to initiate therapy  Sincerely,     Authenticated electronically by Eduardo Orozco MD   on 35/58/26   Board Certified Specialist     Portions of the record may have been created with voice recognition software  Occasional wrong word or "sound a like" substitutions may have occurred due to the inherent limitations of voice recognition software  There may also be notations and random deletions of words or characters from malfunctioning software  Read the chart carefully and recognize, using context, where substitutions/deletions have occurred

## 2022-03-25 NOTE — PROGRESS NOTES
Review of Systems      Genitourinary need to urinate more than twice a night and post menopausal (no peroids)   Cardiology palpitations/fluttering feeling in the chest   Gastrointestinal none   Neurology forgetfulness, poor concentration or confusion, , difficulty with memory and balance problems   Constitutional fatigue   Integumentary rash or dry skin and itching   Psychiatry anxiety and depression   Musculoskeletal joint pain, muscle aches and back pain   Pulmonary snoring   ENT throat clearing   Endocrine none   Hematological none

## 2022-06-09 ENCOUNTER — HOSPITAL ENCOUNTER (OUTPATIENT)
Dept: SLEEP CENTER | Facility: CLINIC | Age: 67
Discharge: HOME | End: 2022-06-09
Payer: MEDICARE

## 2022-06-09 DIAGNOSIS — G47.33 OSA (OBSTRUCTIVE SLEEP APNEA): ICD-10-CM

## 2022-06-09 PROCEDURE — 95811 POLYSOM 6/>YRS CPAP 4/> PARM: CPT

## 2022-06-10 DIAGNOSIS — G47.33 OSA (OBSTRUCTIVE SLEEP APNEA): Primary | ICD-10-CM

## 2022-06-10 NOTE — PROGRESS NOTES
Sleep Study Documentation    Pre-Sleep Study       Sleep testing procedure explained to patient:YES    Patient napped prior to study:NO    Caffeine:Dayshift worker after 12PM   Caffeine use:NO  Alcohol:Dayshift workers after 5PM: Alcohol use:NO    Typical day for patient:NO - usually have a cup of coffee around 6pm        Study Documentation    Sleep Study Indications: KENY diagnosed in lab study    Sleep Study: Treatment   Optimal PAP pressure: 13  Leak:Small  Snore:Eliminated  REM Obtained:yes  Supplemental O2: no    Minimum SaO2 87  Baseline SaO2 93  PAP mask tried (list all)[employer] Simplus Full face, Small  PAP mask choice (final)[employer] Simplus Full face, Small    PAP mask type:full face  PAP pressure at which snoring was eliminated 13  Minimum SaO2 at final PAP pressure 90  CPAP changed to BiPAP:No    Mode of Therapy:CPAP    EKG abnormalities: no     EEG abnormalities: no    Sleep Study Recorded < 2 hours: N/A    Sleep Study Recorded > 2 hours but incomplete study: N/A    Sleep Study Recorded 6 hours but no sleep obtained: NO    Patient classification: employed       Post-Sleep Study    Medication used at bedtime or during sleep study:NO    Patient reports time it took to fall asleep:less than 20 minutes    Patient reports waking up during study:Denied    Patient reports sleeping 4 to 6 hours without dreaming  Patient reports sleep during study:better than usual    Patient rated sleepiness: Somewhat sleepy or tired    PAP treatment:yes: Post PAP treatment patient reports feeling unsure if a change is noted and  would wear PAP mask at home

## 2022-06-14 ENCOUNTER — TELEPHONE (OUTPATIENT)
Dept: SLEEP CENTER | Facility: CLINIC | Age: 67
End: 2022-06-14

## 2022-06-14 NOTE — TELEPHONE ENCOUNTER
Called patient and advised sleep study resulted and CPAP ordered  Patient already scheduled for DME set up 6/23/22 in [facility]  Provided patient date, time and location of appointment  Rx for CPAP and clinical information sent to Kristin Chinchilla via [facility]  Compliance follow up scheduled 8/26/22

## 2022-06-24 LAB
DME PARACHUTE DELIVERY DATE ACTUAL: NORMAL
DME PARACHUTE DELIVERY DATE REQUESTED: NORMAL
DME PARACHUTE DELIVERY NOTE: NORMAL
DME PARACHUTE ITEM DESCRIPTION: NORMAL
DME PARACHUTE ORDER STATUS: NORMAL
DME PARACHUTE SUPPLIER NAME: NORMAL
DME PARACHUTE SUPPLIER PHONE: NORMAL

## 2022-06-30 ENCOUNTER — TELEPHONE (OUTPATIENT)
Dept: SLEEP CENTER | Facility: CLINIC | Age: 67
End: 2022-06-30

## 2022-08-07 LAB
ALBUMIN SERPL-MCNC: 4.3 G/DL (ref 3.6–5.1)
ALBUMIN/GLOB SERPL: 1.9 (CALC) (ref 1–2.5)
ALP SERPL-CCNC: 63 U/L (ref 37–153)
ALT SERPL-CCNC: 25 U/L (ref 6–29)
AST SERPL-CCNC: 18 U/L (ref 10–35)
BILIRUB SERPL-MCNC: 0.6 MG/DL (ref 0.2–1.2)
BUN SERPL-MCNC: 20 MG/DL (ref 7–25)
BUN/CREAT SERPL: NORMAL (CALC) (ref 6–22)
CALCIUM SERPL-MCNC: 9.1 MG/DL (ref 8.6–10.4)
CHLORIDE SERPL-SCNC: 106 MMOL/L (ref 98–110)
CHOLEST SERPL-MCNC: 164 MG/DL
CHOLEST/HDLC SERPL: 3.1 (CALC)
CO2 SERPL-SCNC: 29 MMOL/L (ref 20–32)
CREAT SERPL-MCNC: 0.6 MG/DL (ref 0.5–1.05)
GFR/BSA.PRED SERPLBLD CYS-BASED-ARV: 99 ML/MIN/1.73M2
GLOBULIN SER CALC-MCNC: 2.3 G/DL (CALC) (ref 1.9–3.7)
GLUCOSE SERPL-MCNC: 89 MG/DL (ref 65–99)
HDLC SERPL-MCNC: 53 MG/DL
LDLC SERPL CALC-MCNC: 92 MG/DL (CALC)
NONHDLC SERPL-MCNC: 111 MG/DL (CALC)
POTASSIUM SERPL-SCNC: 4.3 MMOL/L (ref 3.5–5.3)
PROT SERPL-MCNC: 6.6 G/DL (ref 6.1–8.1)
SODIUM SERPL-SCNC: 139 MMOL/L (ref 135–146)
T3FREE SERPL-MCNC: 3.1 PG/ML (ref 2.3–4.2)
T4 FREE SERPL-MCNC: 1.2 NG/DL (ref 0.8–1.8)
TRIGL SERPL-MCNC: 94 MG/DL
TSH SERPL-ACNC: 1.25 MIU/L (ref 0.4–4.5)

## 2022-08-11 ENCOUNTER — RA CDI HCC (OUTPATIENT)
Dept: OTHER | Facility: HOSPITAL | Age: 67
End: 2022-08-11

## 2022-08-11 NOTE — PROGRESS NOTES
[facility]  coding opportunities     E66 01     Chart Reviewed number of suggestions sent to Provider: 1     Patients Insurance     Medicare Insurance: [employer] Called [facility] to arrange transport

## 2022-08-17 ENCOUNTER — OFFICE VISIT (OUTPATIENT)
Dept: FAMILY MEDICINE CLINIC | Facility: CLINIC | Age: 67
End: 2022-08-17
Payer: MEDICARE

## 2022-08-17 VITALS
OXYGEN SATURATION: 96 % | BODY MASS INDEX: 41.85 KG/M2 | TEMPERATURE: 96.1 F | SYSTOLIC BLOOD PRESSURE: 132 MMHG | DIASTOLIC BLOOD PRESSURE: 90 MMHG | HEART RATE: 71 BPM | WEIGHT: 236.2 LBS | RESPIRATION RATE: 16 BRPM | HEIGHT: 63 IN

## 2022-08-17 DIAGNOSIS — Z00.00 ENCOUNTER FOR MEDICARE ANNUAL WELLNESS EXAM: Primary | ICD-10-CM

## 2022-08-17 DIAGNOSIS — G47.33 OSA (OBSTRUCTIVE SLEEP APNEA): ICD-10-CM

## 2022-08-17 DIAGNOSIS — F41.9 ANXIETY: ICD-10-CM

## 2022-08-17 DIAGNOSIS — I10 BENIGN ESSENTIAL HYPERTENSION: ICD-10-CM

## 2022-08-17 DIAGNOSIS — I10 ESSENTIAL HYPERTENSION: ICD-10-CM

## 2022-08-17 PROCEDURE — G0438 PPPS, INITIAL VISIT: HCPCS | Performed by: FAMILY MEDICINE

## 2022-08-17 RX ORDER — AMLODIPINE BESYLATE 2.5 MG/1
2.5 TABLET ORAL DAILY
Qty: 90 TABLET | Refills: 1 | Status: SHIPPED | OUTPATIENT
Start: 2022-08-17

## 2022-08-17 RX ORDER — VENLAFAXINE HYDROCHLORIDE 150 MG/1
150 CAPSULE, EXTENDED RELEASE ORAL DAILY
Qty: 90 CAPSULE | Refills: 3 | Status: SHIPPED | OUTPATIENT
Start: 2022-08-17

## 2022-08-17 RX ORDER — LOSARTAN POTASSIUM 50 MG/1
50 TABLET ORAL DAILY
Qty: 90 TABLET | Refills: 3 | Status: SHIPPED | OUTPATIENT
Start: 2022-08-17

## 2022-08-17 RX ORDER — SEMAGLUTIDE 1.34 MG/ML
0.25 INJECTION, SOLUTION SUBCUTANEOUS WEEKLY
Qty: 2 ML | Refills: 6 | Status: SHIPPED | OUTPATIENT
Start: 2022-08-17 | End: 2022-09-08

## 2022-08-17 NOTE — PROGRESS NOTES
Assessment and Plan:   Danielle Mancera was seen today for medicare wellness visit  Diagnoses and all orders for this visit:    Encounter for Medicare annual wellness exam    BMI 40 0-44 9, adult ([facility] [address] )  -     Semaglutide,0 25 or 0 5MG/DOS, (Ozempic, 0 25 or 0 5 MG/DOSE,) 2 MG/1 5ML SOPN; Inject 0 25 mg under the skin once a week    Benign essential hypertension  -     losartan (COZAAR) 50 mg tablet; Take 1 tablet (50 mg total) by mouth daily    Anxiety  -     venlafaxine (EFFEXOR-XR) 150 mg 24 hr capsule; Take 1 capsule (150 mg total) by mouth daily    Essential hypertension  -     amLODIPine (NORVASC) 2 5 mg tablet; Take 1 tablet (2 5 mg total) by mouth daily    KENY (obstructive sleep apnea)      Problem List Items Addressed This Visit        Respiratory    KENY (obstructive sleep apnea)       Cardiovascular and Mediastinum    Essential hypertension (Chronic)    Relevant Medications    losartan (COZAAR) 50 mg tablet    amLODIPine (NORVASC) 2 5 mg tablet       Other    Anxiety    Relevant Medications    venlafaxine (EFFEXOR-XR) 150 mg 24 hr capsule    BMI 40 0-44 9, adult ([facility])    Relevant Medications    Semaglutide,0 25 or 0 5MG/DOS, (Ozempic, 0 25 or 0 5 MG/DOSE,) 2 MG/1 5ML SOPN      Other Visit Diagnoses     Encounter for Medicare annual wellness exam    -  Primary    Benign essential hypertension        Relevant Medications    losartan (COZAAR) 50 mg tablet    amLODIPine (NORVASC) 2 5 mg tablet        BMI Counseling: Body mass index is 42 38 kg/m²  The BMI is above normal  Nutrition recommendations include decreasing portion sizes, encouraging healthy choices of fruits and vegetables, decreasing fast food intake, consuming healthier snacks, limiting drinks that contain sugar, moderation in carbohydrate intake, increasing intake of lean protein, reducing intake of saturated and trans fat and reducing intake of cholesterol  Exercise recommendations include exercising 3-5 times per week   Rationale for BMI follow-up plan is due to patient being overweight or obese  Depression Screening and Follow-up Plan: Patient was screened for depression during today's encounter  They screened negative with a PHQ-2 score of 2  Preventive health issues were discussed with patient, and age appropriate screening tests were ordered as noted in patient's After Visit Summary  Personalized health advice and appropriate referrals for health education or preventive services given if needed, as noted in patient's After Visit Summary  History of Present Illness:     Patient presents for a Medicare Wellness Visit    Patient is a 79-year-old female here for Medicare Wellness also concerned about inability to lose weight  She does understand and has good insight into low carb, calorie counting  recent blood work reviewed  Also has upcoming sleep med appointment  Mammogram up-to-date  Patient Care Team:  Steph Herrera DO as PCP - General  Steph Herrera DO as PCP - [address] [place] [address] ([facility])  Tabby Holden MD     Review of Systems:     Review of Systems   Constitutional: Unexpected weight change: Can not lose  Respiratory: Negative for shortness of breath  Gastrointestinal: Negative for constipation  Genitourinary:        Some intermittent mixed incontinence   Psychiatric/Behavioral: Sleep disturbance:  intermittent          Problem List:     Patient Active Problem List   Diagnosis    Essential hypertension    Cervical radiculopathy    Irritable bowel syndrome    Nontoxic single thyroid nodule    Vitamin D deficiency    Vitamin B12 deficiency    Menopausal symptoms    Anxiety    Primary localized osteoarthritis of pelvic region and thigh    Hx of total hip arthroplasty, right    Chronic back pain    BMI 40 0-44 9, adult (HCC)    KENY (obstructive sleep apnea)      Past Medical and Surgical History:     Past Medical History:   Diagnosis Date    ACE-inhibitor cough     Last assessed 12/04/15    Anxiety     Arthritis  Chronic pain disorder     Depression     Disease of thyroid gland     benign nodule    History of transfusion     no adverse reaction    Hypertension     Wears glasses      Past Surgical History:   Procedure Laterality Date     SECTION      CHOLECYSTECTOMY      COLONOSCOPY      JOINT REPLACEMENT Left     hip    CT TOTAL HIP ARTHROPLASTY Right 7/10/2018    Procedure: ARTHROPLASTY HIP TOTAL;  Surgeon: Michael Cox MD;  Location: [facility];  Service: Orthopedics    TONSILLECTOMY AND ADENOIDECTOMY      TUBAL LIGATION      VAGINAL HYSTERECTOMY Bilateral 2007    nd ooperectomy      Family History:     Family History   Problem Relation Age of Onset    Aortic stenosis Mother     Heart murmur Mother     Hypertension Mother     Heart disease Mother     Hypertension Family     Coronary artery disease Family         Stenosis    Hypertension Father     Cancer Father         head and neck spread to spine and brain    Skin cancer Father     Hypertension Sister     Hypertension Brother     Skin cancer Brother     Hyperlipidemia Brother     Thyroid disease Neg Hx     Diabetes Neg Hx       Social History:     Social History     Socioeconomic History    Marital status:      Spouse name: None    Number of children: None    Years of education: None    Highest education level: None   Occupational History    None   Tobacco Use    Smoking status: Former Smoker     Quit date:      Years since quittin 6    Smokeless tobacco: Never Used   Substance and Sexual Activity    Alcohol use:  Yes     Alcohol/week: 3 0 standard drinks     Types: 3 Glasses of wine per week     Comment: on the weekend; not much    Drug use: No    Sexual activity: None   Other Topics Concern    None   Social History Narrative    As per allscripts: Single     Social Determinants of Health     Financial Resource Strain: Low Risk     Difficulty of Paying Living Expenses: Not very hard   Food Insecurity: Not on file   Transportation Needs: No Transportation Needs    Lack of Transportation (Medical): No    Lack of Transportation (Non-Medical): No   Physical Activity: Not on file   Stress: Not on file   Social Connections: Not on file   Intimate Partner Violence: Not on file   Housing Stability: Not on file      Medications and Allergies:     Current Outpatient Medications   Medication Sig Dispense Refill    amLODIPine (NORVASC) 2 5 mg tablet Take 1 tablet (2 5 mg total) by mouth daily 90 tablet 1    losartan (COZAAR) 50 mg tablet Take 1 tablet (50 mg total) by mouth daily 90 tablet 3    Semaglutide,0 25 or 0 5MG/DOS, (Ozempic, 0 25 or 0 5 MG/DOSE,) 2 MG/1 5ML SOPN Inject 0 25 mg under the skin once a week 2 mL 6    venlafaxine (EFFEXOR-XR) 150 mg 24 hr capsule Take 1 capsule (150 mg total) by mouth daily 90 capsule 3     No current facility-administered medications for this visit  Allergies   Allergen Reactions    Lisinopril Cough    Ace Inhibitors Cough     Category: Adverse Reaction;       Immunizations:     Immunization History   Administered Date(s) Administered    COVID-19 MODERNA VACC 0 5 ML IM 04/19/2021, 05/19/2021, 12/27/2021    Influenza, high dose seasonal 0 7 mL 12/20/2021    Pneumococcal Polysaccharide PPV23 07/29/2021    Tdap 06/02/2016, 06/02/2016, 12/13/2021    Tuberculin Skin Test-PPD Intradermal 07/16/2018      Health Maintenance:         Topic Date Due    Colorectal Cancer Screening  09/18/2027    Hepatitis C Screening  Completed         Topic Date Due    COVID-19 Vaccine (4 - Booster for Moderna series) 04/27/2022    Pneumococcal Vaccine: 65+ Years (2 - PCV) 07/29/2022    Influenza Vaccine (1) 09/01/2022      Medicare Screening Tests and Risk Assessments:     Diego Addison is here for her Subsequent Wellness visit  Health Risk Assessment:   Patient rates overall health as good  Patient feels that their physical health rating is same   Patient is satisfied with their life  Greene Red was rated as slightly worse  Hearing was rated as same  Patient feels that their emotional and mental health rating is same  Patients states they are never, rarely angry  Patient states they are sometimes unusually tired/fatigued  Pain experienced in the last 7 days has been some  Patient's pain rating has been 5/10  Patient states that she has experienced weight loss or gain in last 6 months  Depression Screening:   PHQ-2 Score: 2      Fall Risk Screening: In the past year, patient has experienced: no history of falling in past year      Urinary Incontinence Screening:   Patient has not leaked urine accidently in the last six months  Home Safety:  Patient does not have trouble with stairs inside or outside of their home  Patient has working smoke alarms and has working carbon monoxide detector  Home safety hazards include: none  Nutrition:   Current diet is Low Carb and Limited junk food  Medications:   Patient is currently taking over-the-counter supplements  OTC medications include: Balance of Nature fruit s & vegetables   supplements, vitamin  B complex, C, & D,  fish oil  Patient is able to manage medications  Activities of Daily Living (ADLs)/Instrumental Activities of Daily Living (IADLs):   Walk and transfer into and out of bed and chair?: Yes  Dress and groom yourself?: Yes    Bathe or shower yourself?: Yes    Feed yourself? Yes  Do your laundry/housekeeping?: Yes  Manage your money, pay your bills and track your expenses?: Yes  Make your own meals?: Yes    Do your own shopping?: Yes    Durable Medical Equipment Suppliers  [employer]    Previous Hospitalizations:   Any hospitalizations or ED visits within the last 12 months?: No      Advance Care Planning:   Living will: Yes    Durable POA for healthcare:  Yes    Advanced directive: Yes    End of Life Decisions reviewed with patient: Yes      Cognitive Screening:   Provider or family/friend/caregiver concerned regarding cognition?: No    PREVENTIVE SCREENINGS      Cardiovascular Screening:    General: Screening Current      Diabetes Screening:     General: Screening Current      Colorectal Cancer Screening:     General: Screening Current      Breast Cancer Screening:     General: Risks and Benefits Discussed    Due for: Mammogram        Cervical Cancer Screening:    General: Screening Not Indicated      Osteoporosis Screening:    General: Risks and Benefits Discussed      Abdominal Aortic Aneurysm (AAA) Screening:        General: Screening Not Indicated      Lung Cancer Screening:     General: Screening Not Indicated      Hepatitis C Screening:    General: Screening Current and Screening Not Indicated    Hep C Screening Accepted: No     Screening, Brief Intervention, and Referral to Treatment (SBIRT)    Screening  Typical number of drinks in a day: 0  Typical number of drinks in a week: 2  Interpretation: Low risk drinking behavior  AUDIT-C Screenin) How often did you have a drink containing alcohol in the past year? 2 to 4 times a month  2) How many drinks did you have on a typical day when you were drinking in the past year? 1 to 2  3) How often did you have 6 or more drinks on one occasion in the past year? never    AUDIT-C Score: 2  Interpretation: Score 0-2 (female): Negative screen for alcohol misuse    Single Item Drug Screening:  How often have you used an illegal drug (including marijuana) or a prescription medication for non-medical reasons in the past year? never    Single Item Drug Screen Score: 0  Interpretation: Negative screen for possible drug use disorder    No exam data present     Physical Exam:     /90   Pulse 71   Temp (!) 96 1 °F (35 6 °C) (Temporal)   Resp 16   Ht 5' 2 6" (1 59 m)   Wt 107 kg (236 lb 3 2 oz)   SpO2 96%   BMI 42 38 kg/m²     Physical Exam  Vitals and nursing note reviewed  Constitutional:       General: She is not in acute distress  Appearance: Normal appearance   She is well-developed  She is obese  HENT:      Head: Normocephalic and atraumatic  Right Ear: Tympanic membrane normal       Left Ear: Tympanic membrane normal       Nose: Nose normal       Mouth/Throat:      Mouth: Mucous membranes are moist    Eyes:      Conjunctiva/sclera: Conjunctivae normal    Neck:      Vascular: No carotid bruit  Cardiovascular:      Rate and Rhythm: Normal rate and regular rhythm  Heart sounds: No murmur heard  Pulmonary:      Effort: Pulmonary effort is normal  No respiratory distress  Breath sounds: Normal breath sounds  Abdominal:      Palpations: Abdomen is soft  Tenderness: There is no abdominal tenderness  Comments: No bruit   Musculoskeletal:      Cervical back: Neck supple  Comments: Slight forward kyphotic gait   Skin:     General: Skin is warm and dry  Neurological:      Mental Status: She is alert and oriented to person, place, and time  Psychiatric:         Mood and Affect: Mood normal          Behavior: Behavior normal          Thought Content: Thought content normal          Judgment: Judgment normal           Darin Rico DO  BMI Counseling: Body mass index is 42 38 kg/m²  The BMI is above normal  Nutrition recommendations include reducing portion sizes, decreasing overall calorie intake, 3-5 servings of fruits/vegetables daily, reducing fast food intake, consuming healthier snacks, decreasing soda and/or juice intake, moderation in carbohydrate intake, increasing intake of lean protein, reducing intake of saturated fat and trans fat and reducing intake of cholesterol  Exercise recommendations include vigorous aerobic physical activity for 75 minutes/week     Discussed pharmacological intervention

## 2022-08-22 ENCOUNTER — TELEPHONE (OUTPATIENT)
Dept: ADMINISTRATIVE | Facility: OTHER | Age: 67
End: 2022-08-22

## 2022-08-22 DIAGNOSIS — Z12.31 ENCOUNTER FOR SCREENING MAMMOGRAM FOR MALIGNANT NEOPLASM OF BREAST: ICD-10-CM

## 2022-08-22 NOTE — TELEPHONE ENCOUNTER
Upon review of the In Basket request we were able to note that no further action is required  The patient chart is up to date as a result of a previous request       Any additional questions or concerns should be emailed to the Practice Liaisons via [email or web address] email, please do not reply via In Basket      Thank you  Farhat Cardoso MA

## 2022-08-22 NOTE — TELEPHONE ENCOUNTER
----- Message from Ping Matthews sent at 8/22/2022 10:50 AM EDT -----  Regarding: care gap request - mammo  08/22/22 10:50 AM    Hello, our patient attached above has had Mammogram completed/performed  Please assist in updating the patient chart by pulling the Care Everywhere (CE) document  The date of service is 08/12/2022       Thank you,  [facility]

## 2022-09-02 ENCOUNTER — OFFICE VISIT (OUTPATIENT)
Dept: SLEEP CENTER | Facility: CLINIC | Age: 67
End: 2022-09-02
Payer: MEDICARE

## 2022-09-02 VITALS
HEART RATE: 73 BPM | HEIGHT: 63 IN | WEIGHT: 234 LBS | BODY MASS INDEX: 41.46 KG/M2 | SYSTOLIC BLOOD PRESSURE: 142 MMHG | DIASTOLIC BLOOD PRESSURE: 67 MMHG

## 2022-09-02 DIAGNOSIS — F45.8 BRUXISM: ICD-10-CM

## 2022-09-02 DIAGNOSIS — G47.61 PLMD (PERIODIC LIMB MOVEMENT DISORDER): ICD-10-CM

## 2022-09-02 DIAGNOSIS — R00.2 PALPITATIONS: ICD-10-CM

## 2022-09-02 DIAGNOSIS — G89.29 CHRONIC BILATERAL LOW BACK PAIN WITHOUT SCIATICA: ICD-10-CM

## 2022-09-02 DIAGNOSIS — G47.10 DAYTIME HYPERSOMNIA: ICD-10-CM

## 2022-09-02 DIAGNOSIS — M54.50 CHRONIC BILATERAL LOW BACK PAIN WITHOUT SCIATICA: ICD-10-CM

## 2022-09-02 DIAGNOSIS — I10 ESSENTIAL HYPERTENSION: ICD-10-CM

## 2022-09-02 DIAGNOSIS — G47.33 OSA (OBSTRUCTIVE SLEEP APNEA): Primary | ICD-10-CM

## 2022-09-02 PROCEDURE — 99214 OFFICE O/P EST MOD 30 MIN: CPT | Performed by: INTERNAL MEDICINE

## 2022-09-02 NOTE — PROGRESS NOTES
Review of Systems      Genitourinary post menopausal (no peroids)   Cardiology palpitations/fluttering feeling in the chest   Gastrointestinal none   Neurology none   Constitutional none   Integumentary rash or dry skin   Psychiatry anxiety   Musculoskeletal joint pain, muscle aches, back pain and sciatica   Pulmonary shortness of breath with activity and snoring   ENT throat clearing   Endocrine none   Hematological none

## 2022-09-02 NOTE — PATIENT INSTRUCTIONS

## 2022-09-02 NOTE — PROGRESS NOTES
Follow-Up Note - Sleep Center   [facility]  78 y o  female  :1955  QBN:[id number]  DOS:2022    CC: I saw this patient for follow-up in clinic today for Sleep disordered breathing, Coexisting Sleep and Medical Problems  A sleep study to titrate Positive airway pressure (PAP) therapy was undertaken  Patient is here to review results and to initiate therapy  The study demonstrated sleep disordered breathing was adequately remediated with PAP at 13 cm H2O  She was observed while supine but not during stage REM at the final pressure  Periodic limb movements of sleep were eliminated  She had short sleep and REM latencies and high sleep efficiency suggesting sleep deprivation    The study demonstrated   obstructive sleep apnea: AHI  13 7 /hour     Severity may be under stated because she had no stage REM  Moderate intensity  snoring  was noted and there were also 6 respiratory effort-related arousal   Minimum oxygen saturation 89%   There was severe periodic limb movements of sleep for an index of 64 4 per hour  Sleep latency was short suggesting sleep deprivation  PFSH, Problem List, Medications & Allergies were reviewed in EMR  Interval changes: none reported  She  has a past medical history of ACE-inhibitor cough, Anxiety, Arthritis, Chronic pain disorder, Depression, Disease of thyroid gland, History of transfusion, Hypertension, and Wears glasses  She has a current medication list which includes the following prescription(s): amlodipine, losartan, ozempic (0 25 or 0 5 mg/dose), and venlafaxine  PHYSIOLOGICAL DATA REVIEW AND INTERPRETATION:    using PAP > 4 hours/night 93%  Estimated ELIZABETH 1 2/hour with pressure of 13cm H2O @90th/95th percentile; Patient has not been using ozone based devices to sanitize the machine  SUBJECTIVE: Regarding use of PAP, Jenna Beckwith reports:   · some adverse effects: dry mouth/throat; has not noticed any fibres or foreign material in air line  · She is benefiting from use: sleeping better   · She has radicular symptoms but no periodic limb movements of sleep  Sleep Routine: Bobo Vazquez reports getting 6-7 hrs sleep on work days and more on days off; she has no difficulty initiating or maintaining sleep   She arises needing an alarm and is not always refreshed  Bobo Vazquez denies Excessive Daytime Sleepiness, or napping  She rated herself at Total score: 8 /24 on the [place] Sleepiness Scale  Habits: reports that she quit smoking about 32 years ago  She has never used smokeless tobacco ,  reports current alcohol use of about 3 0 standard drinks of alcohol per week  ,  reports no history of drug use , Caffeine use:limited ; Exercise routine: none   ROS: reviewed & as attached  Significant for some weight gain since her initial visit  She has some nasal congestion and postnasal drip  She has some dyspnea on effort  She has episodic palpitations and feelings of anxiety  She has musculoskeletal aches and pains and radicular symptoms    EXAM: /67 (BP Location: Left arm, Patient Position: Sitting, Cuff Size: Large)   Pulse 73   Ht 5' 3" (1 6 m)   Wt 106 kg (234 lb)   BMI 41 45 kg/m²     Wt Readings from Last 3 Encounters:   09/02/22 106 kg (234 lb)   08/17/22 107 kg (236 lb 3 2 oz)   03/25/22 103 kg (227 lb)      Patient is well groomed; well appearing  CNS: Alert, orientated, clear & coherent speech  Psych: cooperativeand in no distress  Mental state:appears normal   H&N: EOMI; NC/AT:no facial pressure marks, no rashes  Skin/Extrem: col & hydration normal; no edema  Resp: Respiratory effort is normal  Physical findings otherwise essentially unchanged from previous  IMPRESSION: Problem List Items & Comorbidities Addressed this Visit    1  KENY (obstructive sleep apnea)  PAP DME Resupply/Reorder   2  PLMD (periodic limb movement disorder)     3  Bruxism     4  Daytime hypersomnia     5  Palpitations     6  Essential hypertension     7  Chronic bilateral low back pain without sciatica     8  BMI 40 0-44 9, adult ([facility] [address] )         PLAN:  1  I reviewed results of prior studies and physiologic data with the patient  2  I discussed treatment options with risks and benefits  3  Treatment with  PAP is medically necessary and [place] Chime is agreable to continue use  4  Care of equipment, methods to improve comfort using PAP and importance of compliance with therapy were discussed  5  Pressure setting: continue 13 cmH2O     6  Rx provided to replace  supplies and Care coordinated with DME provider  7  She may benefit from Cymbalta in place of Effexor  8  Discussed strategies for weight reduction  9  I advised starting a regular exercise routine and allowing sufficient opportunity for sleep  10  Follow-up is advised in 1 year or sooner if needed to monitor progress, compliance and to adjust therapy  Thank you for allowing me to participate in the care of this patient  Sincerely,     Authenticated electronically on 82/44/62   Board Certified Specialist     Portions of the record may have been created with voice recognition software  Occasional wrong word or "sound a like" substitutions may have occurred due to the inherent limitations of voice recognition software  There may also be notations and random deletions of words or characters from malfunctioning software  Read the chart carefully and recognize, using context, where substitutions/deletions have occurred

## 2022-09-06 ENCOUNTER — TELEPHONE (OUTPATIENT)
Dept: SLEEP CENTER | Facility: CLINIC | Age: 67
End: 2022-09-06

## 2022-09-09 LAB
DME PARACHUTE DELIVERY DATE ACTUAL: NORMAL
DME PARACHUTE DELIVERY DATE EXPECTED: NORMAL
DME PARACHUTE DELIVERY DATE REQUESTED: NORMAL
DME PARACHUTE ITEM DESCRIPTION: NORMAL
DME PARACHUTE ORDER STATUS: NORMAL
DME PARACHUTE SUPPLIER NAME: NORMAL
DME PARACHUTE SUPPLIER PHONE: NORMAL

## 2022-11-14 ENCOUNTER — RA CDI HCC (OUTPATIENT)
Dept: OTHER | Facility: HOSPITAL | Age: 67
End: 2022-11-14

## 2022-11-14 NOTE — PROGRESS NOTES
[facility] 75  coding opportunities        E66 01  Chart Reviewed number of suggestions sent to Provider: 1     Patients Insurance     Medicare Insurance: [employer]

## 2022-12-23 ENCOUNTER — OFFICE VISIT (OUTPATIENT)
Dept: FAMILY MEDICINE CLINIC | Facility: CLINIC | Age: 67
End: 2022-12-23

## 2022-12-23 VITALS
WEIGHT: 229 LBS | TEMPERATURE: 97.3 F | OXYGEN SATURATION: 97 % | HEIGHT: 63 IN | RESPIRATION RATE: 16 BRPM | BODY MASS INDEX: 40.57 KG/M2 | HEART RATE: 83 BPM

## 2022-12-23 DIAGNOSIS — L23.9 ALLERGIC CONTACT DERMATITIS, UNSPECIFIED TRIGGER: ICD-10-CM

## 2022-12-23 DIAGNOSIS — Z23 ENCOUNTER FOR IMMUNIZATION: ICD-10-CM

## 2022-12-23 DIAGNOSIS — G47.33 OSA (OBSTRUCTIVE SLEEP APNEA): ICD-10-CM

## 2022-12-23 DIAGNOSIS — I10 ESSENTIAL HYPERTENSION: Primary | Chronic | ICD-10-CM

## 2022-12-23 RX ORDER — PREDNISONE 10 MG/1
TABLET ORAL
Qty: 21 EACH | Refills: 0 | Status: SHIPPED | OUTPATIENT
Start: 2022-12-23

## 2022-12-23 NOTE — PROGRESS NOTES
Name: Lori Larson      : 1955      MRN: [id number]  Encounter Provider: Shaheed Raymundo DO  Encounter Date: 2022   Encounter department: [facility] PRIMARY CARE    Assessment & Plan     1  Essential hypertension    2  Allergic contact dermatitis, unspecified trigger  -     predniSONE 10 MG (21) TBPK; As directed take 6-5-4-3-2-1    3  Encounter for immunization  -     influenza vaccine, high-dose, PF 0 7 mL (FLUZONE HIGH-DOSE)    4  KENY (obstructive sleep apnea)  Comments:  CPAP 13 cm    5  BMI 40 0-44 9, adult [facility])         The current medical regimen is effective;  continue present plan and medications  The patient is asked to continue to make an attempt to improve diet and exercise patterns to aid in medical management of this problem  Subjective     HPI   Chief Complaint   Patient presents with   • Follow-up     3 month followup  Pt also has rash on chest area and arms and neck area  times 10 days      Review of Systems   Constitutional: Positive for unexpected weight change (minus 5 # has had a tough time getting motivated)  Respiratory: Negative for shortness of breath  Cardiovascular: Negative for chest pain  Musculoskeletal: Positive for arthralgias (fair)  Psychiatric/Behavioral: Positive for dysphoric mood (witj holidays) and sleep disturbance  Holiday stressors  Son and daughter-in-law live in [place] with her grandchildren and it is tough not being able to see them on a regular basis   Labs were updated in spring  Mammogram is up-to-date  Ultrasound thyroid can be repeated  Had an evaluation with hand orthopedics  Followed up with sleep medicine-patient will be using CPAP  Saw cardiology last year  Testing has been done    Past Medical History:   Diagnosis Date   • ACE-inhibitor cough     Last assessed 12/04/15   • Anxiety    • Arthritis    • Chronic pain disorder    • Depression    • Disease of thyroid gland     benign nodule   • History of transfusion     no adverse reaction   • Hypertension    • Wears glasses      Past Surgical History:   Procedure Laterality Date   •  SECTION     • CHOLECYSTECTOMY     • COLONOSCOPY     • JOINT REPLACEMENT Left     hip   • NV ARTHRP ACETBLR/PROX FEM PROSTC AGRFT/ALGRFT Right 7/10/2018    Procedure: ARTHROPLASTY HIP TOTAL;  Surgeon: Juana Johnson MD;  Location: [facility];  Service: Orthopedics   • TONSILLECTOMY AND ADENOIDECTOMY     • TUBAL LIGATION     • VAGINAL HYSTERECTOMY Bilateral 2007    nd ooperectomy     Family History   Problem Relation Age of Onset   • Aortic stenosis Mother    • Heart murmur Mother    • Hypertension Mother    • Heart disease Mother    • Hypertension Family    • Coronary artery disease Family         Stenosis   • Hypertension Father    • Cancer Father         head and neck spread to spine and brain   • Skin cancer Father    • Hypertension Sister    • Hypertension Brother    • Skin cancer Brother    • Hyperlipidemia Brother    • Thyroid disease Neg Hx    • Diabetes Neg Hx      Social History     Socioeconomic History   • Marital status:      Spouse name: None   • Number of children: None   • Years of education: None   • Highest education level: None   Occupational History   • None   Tobacco Use   • Smoking status: Former     Types: Cigarettes     Quit date:      Years since quittin 0   • Smokeless tobacco: Never   Substance and Sexual Activity   • Alcohol use: Yes     Alcohol/week: 3 0 standard drinks     Types: 3 Glasses of wine per week     Comment: on the weekend; not much   • Drug use: No   • Sexual activity: None   Other Topics Concern   • None   Social History Narrative    As per allscripts: Single     Social Determinants of Health     Financial Resource Strain: Low Risk    • Difficulty of Paying Living Expenses: Not very hard   Food Insecurity: Not on file   Transportation Needs: No Transportation Needs   • Lack of Transportation (Medical):  No   • Lack of Transportation (Non-Medical): No   Physical Activity: Not on file   Stress: Not on file   Social Connections: Not on file   Intimate Partner Violence: Not on file   Housing Stability: Not on file     Current Outpatient Medications on File Prior to Visit   Medication Sig   • amLODIPine (NORVASC) 2 5 mg tablet Take 1 tablet (2 5 mg total) by mouth daily   • losartan (COZAAR) 50 mg tablet Take 1 tablet (50 mg total) by mouth daily   • Ozempic, 0 25 or 0 5 MG/DOSE, 2 MG/1 5ML SOPN INJECT 0 25 MG UNDER THE SKIN ONCE A WEEK   • venlafaxine (EFFEXOR-XR) 150 mg 24 hr capsule Take 1 capsule (150 mg total) by mouth daily     Allergies   Allergen Reactions   • Lisinopril Cough   • Ace Inhibitors Cough     Category: Adverse Reaction;      Immunization History   Administered Date(s) Administered   • COVID-19 MODERNA VACC 0 5 ML IM 04/19/2021, 05/19/2021, 12/27/2021   • INFLUENZA 11/30/2022   • Influenza, high dose seasonal 0 7 mL 12/20/2021, 12/23/2022   • Pneumococcal Polysaccharide PPV23 07/29/2021   • Tdap 06/02/2016, 06/02/2016, 12/13/2021   • Tuberculin Skin Test-PPD Intradermal 07/16/2018       Objective     Pulse 83   Temp (!) 97 3 °F (36 3 °C) (Temporal)   Resp 16   Ht 5' 2 6" (1 59 m)   Wt 104 kg (229 lb)   SpO2 97%   BMI 41 09 kg/m²     Physical Exam  Constitutional:       General: She is not in acute distress  Appearance: Normal appearance  She is well-developed  Comments: 52-year-old female who appears younger than her stated age   HENT:      Head: Normocephalic  Right Ear: Tympanic membrane normal       Left Ear: Tympanic membrane normal       Nose: Nose normal       Mouth/Throat:      Mouth: Mucous membranes are moist    Eyes:      Conjunctiva/sclera: Conjunctivae normal       Pupils: Pupils are equal, round, and reactive to light  Neck:      Vascular: No carotid bruit  Cardiovascular:      Rate and Rhythm: Normal rate and regular rhythm  Pulses: Normal pulses        Heart sounds: Normal heart sounds  No murmur heard  Pulmonary:      Effort: Pulmonary effort is normal       Breath sounds: Normal breath sounds  Abdominal:      General: Bowel sounds are normal       Palpations: Abdomen is soft  There is no mass  Tenderness: There is no abdominal tenderness  Skin:     General: Skin is warm and dry  Neurological:      Mental Status: She is alert and oriented to person, place, and time  Deep Tendon Reflexes: Reflexes are normal and symmetric  Psychiatric:         Mood and Affect: Mood normal          Behavior: Behavior normal          Thought Content:  Thought content normal          Judgment: Judgment normal        Jahaira Ponce DO

## 2022-12-29 NOTE — RESULT NOTES
Verified Results  * XR ANKLE 3+ VIEW RIGHT [id number] 13:82JB [employer] Order Number: [id number]     Test Name Result Flag Reference   XR ANKLE 3+ VW RIGHT (Report)     RIGHT ANKLE     INDICATION: Injury pain and bruising     COMPARISON: None     VIEWS: 3; 3 images     FINDINGS:     There is no acute fracture or dislocation  No degenerative changes  No lytic or blastic lesions are seen  Soft tissues are unremarkable  IMPRESSION:     No acute osseous abnormality  Workstation performed: [id number]     Signed by:   Karsten Casanova MD   6/9/16     * XR FOOT 3+ VIEW RIGHT [id number] 46:74CC [employer] Order Number: [id number]   Performing Comments: attn right 5th metatarsal     Test Name Result Flag Reference   XR FOOT 3+ VW RIGHT (Report)     RIGHT FOOT     INDICATION: Right foot pain  COMPARISON: None     VIEWS: 3; 3 images     FINDINGS:     There is no acute fracture or dislocation  Degenerative changes are seen within the 1st MTP joint  No lytic or blastic lesions are seen  Soft tissues are unremarkable  IMPRESSION:     No acute osseous abnormality  Workstation performed: [id number]     Signed by:   Karsten Casanova MD   6/9/16       Results/Data  Results    * XR ANKLE 3+ VIEW RIGHT   XR ANKLE 3+ VW RIGHT: RIGHT ANKLE     INDICATION:  Injury pain and bruising     COMPARISON: None     VIEWS:  3; 3 images     FINDINGS:     There is no acute fracture or dislocation  No degenerative changes  No lytic or blastic lesions are seen  Soft tissues are unremarkable  IMPRESSION:     No acute osseous abnormality  Workstation performed: [id number]     Signed by:   Karsten Casanova MD   6/9/16   XR FOOT 3+ VIEW RIGHT   XR FOOT 3+ VW RIGHT: RIGHT FOOT     INDICATION:  Right foot pain  COMPARISON: None     VIEWS:  3; 3 images     FINDINGS:     There is no acute fracture or dislocation       Degenerative changes are seen within the 1st MTP joint  No lytic or blastic lesions are seen  Soft tissues are unremarkable  IMPRESSION:     No acute osseous abnormality         Workstation performed: [id number]     Signed by:   Dimitris Mc MD   6/9/16 no

## 2023-02-12 DIAGNOSIS — I10 ESSENTIAL HYPERTENSION: ICD-10-CM

## 2023-02-13 RX ORDER — AMLODIPINE BESYLATE 2.5 MG/1
TABLET ORAL
Qty: 90 TABLET | Refills: 1 | Status: SHIPPED | OUTPATIENT
Start: 2023-02-13

## 2023-05-25 ENCOUNTER — OFFICE VISIT (OUTPATIENT)
Dept: FAMILY MEDICINE CLINIC | Facility: CLINIC | Age: 68
End: 2023-05-25

## 2023-05-25 VITALS
TEMPERATURE: 96.3 F | DIASTOLIC BLOOD PRESSURE: 80 MMHG | BODY MASS INDEX: 43.52 KG/M2 | WEIGHT: 245.6 LBS | HEART RATE: 77 BPM | RESPIRATION RATE: 16 BRPM | HEIGHT: 63 IN | OXYGEN SATURATION: 96 % | SYSTOLIC BLOOD PRESSURE: 134 MMHG

## 2023-05-25 DIAGNOSIS — I10 ESSENTIAL HYPERTENSION: Chronic | ICD-10-CM

## 2023-05-25 DIAGNOSIS — R60.0 BILATERAL LOWER EXTREMITY EDEMA: Primary | ICD-10-CM

## 2023-05-25 DIAGNOSIS — F41.9 ANXIETY: ICD-10-CM

## 2023-05-25 DIAGNOSIS — M54.50 CHRONIC BILATERAL LOW BACK PAIN WITHOUT SCIATICA: Chronic | ICD-10-CM

## 2023-05-25 DIAGNOSIS — R06.02 SHORTNESS OF BREATH: ICD-10-CM

## 2023-05-25 DIAGNOSIS — G89.29 CHRONIC BILATERAL LOW BACK PAIN WITHOUT SCIATICA: Chronic | ICD-10-CM

## 2023-05-25 NOTE — PROGRESS NOTES
Name: Manjit Fernandez      : 1955      MRN: [id number]  Encounter Provider: Germain Pnito MD  Encounter Date: 2023   Encounter department: [address] [place] [address]     Patient reported dyspnea on exertion, especially when walking upstairs  She does have bilateral pitting edema of her lower extremities  Pulses are intact  We will check an echocardiogram   She does take amlodipine, which may also be a cause for her lower extremity edema  Recommend patient to keep her legs raised, and slightly reduce her water intake  Continue amlodipine and losartan for essential hypertension, blood pressure stable at 134/80 today  She is doing well with her Effexor for anxiety, continue medication as prescribed  Continue ibuprofen as needed for lower back pain  Recommend stretching and exercising  We will call patient with results and next steps  1  Bilateral lower extremity edema  -     Echo complete w/ contrast if indicated; Future; Expected date: 2023    2  Essential hypertension    3  Shortness of breath  -     Echo complete w/ contrast if indicated; Future; Expected date: 2023    4  Anxiety    5  Chronic bilateral low back pain without sciatica          Chief Complaint   Patient presents with   • Joint Swelling     Swollen ankles x's 2 weeks    • Palpitations     Pt complaining of palpitations and SOB comes and goes pt denies any chest pain        Subjective      She presents today with a 2-week history of worsening lower extremity edema bilaterally  Also reporting some shortness of breath  Denies any fevers or chest pain at this time  Compliant with her blood pressure medications  Doing well with her Effexor  Rarely takes ibuprofen for back pain    Review of Systems   Constitutional: Negative for activity change, appetite change, chills, fatigue and fever  HENT: Negative for congestion, rhinorrhea, sneezing and sore throat      Eyes: Negative for "pain, discharge, redness and itching  Respiratory: Positive for shortness of breath  Negative for cough, chest tightness and wheezing  Cardiovascular: Negative for chest pain and palpitations  Gastrointestinal: Negative for abdominal distention, abdominal pain, constipation, diarrhea, nausea and vomiting  Musculoskeletal: Positive for arthralgias, back pain and myalgias  Negative for joint swelling  Skin: Negative for rash  Neurological: Negative for dizziness, weakness, numbness and headaches  Hematological: Negative for adenopathy  Psychiatric/Behavioral: Negative for confusion  All other systems reviewed and are negative  Current Outpatient Medications on File Prior to Visit   Medication Sig   • amLODIPine (NORVASC) 2 5 mg tablet TAKE 1 TABLET BY MOUTH EVERY DAY   • ibuprofen (MOTRIN) 200 mg tablet Take by mouth every 6 (six) hours as needed for mild pain   • losartan (COZAAR) 50 mg tablet Take 1 tablet (50 mg total) by mouth daily   • venlafaxine (EFFEXOR-XR) 150 mg 24 hr capsule Take 1 capsule (150 mg total) by mouth daily       Objective     /80 (BP Location: Left arm, Patient Position: Sitting, Cuff Size: Large)   Pulse 77   Temp (!) 96 3 °F (35 7 °C) (Temporal)   Resp 16   Ht 5' 3 39\" (1 61 m)   Wt 111 kg (245 lb 9 6 oz)   SpO2 96%   BMI 42 98 kg/m²     Physical Exam  Vitals reviewed  Constitutional:       General: She is not in acute distress  Appearance: Normal appearance  She is well-developed  She is not toxic-appearing or diaphoretic  HENT:      Head: Normocephalic and atraumatic  Nose: Nose normal       Mouth/Throat:      Mouth: Mucous membranes are moist    Eyes:      General: No scleral icterus  Right eye: No discharge  Left eye: No discharge  Conjunctiva/sclera: Conjunctivae normal    Cardiovascular:      Rate and Rhythm: Normal rate and regular rhythm  Pulses: Normal pulses  Heart sounds: Normal heart sounds   No murmur " heard   Pulmonary:      Effort: Pulmonary effort is normal  No respiratory distress  Breath sounds: Normal breath sounds  No wheezing  Abdominal:      General: There is no distension  Palpations: Abdomen is soft  Tenderness: There is no abdominal tenderness  Musculoskeletal:         General: No tenderness  Normal range of motion  Cervical back: Normal range of motion  Right lower leg: Edema present  Left lower leg: Edema present  Skin:     General: Skin is warm and dry  Coloration: Skin is not pale  Findings: No erythema  Neurological:      Mental Status: She is alert     Psychiatric:         Mood and Affect: Mood normal          Behavior: Behavior normal        Danis Solis MD

## 2023-06-02 ENCOUNTER — HOSPITAL ENCOUNTER (OUTPATIENT)
Dept: NON INVASIVE DIAGNOSTICS | Facility: HOSPITAL | Age: 68
Discharge: HOME | End: 2023-06-02
Attending: FAMILY MEDICINE

## 2023-06-02 VITALS
HEART RATE: 77 BPM | WEIGHT: 245 LBS | HEIGHT: 63 IN | BODY MASS INDEX: 43.41 KG/M2 | DIASTOLIC BLOOD PRESSURE: 80 MMHG | SYSTOLIC BLOOD PRESSURE: 134 MMHG

## 2023-06-02 DIAGNOSIS — R06.02 SHORTNESS OF BREATH: ICD-10-CM

## 2023-06-02 DIAGNOSIS — R60.0 BILATERAL LOWER EXTREMITY EDEMA: ICD-10-CM

## 2023-06-02 LAB
AORTIC ROOT: 3.2 CM
AORTIC VALVE MEAN VELOCITY: 7.5 M/S
APICAL FOUR CHAMBER EJECTION FRACTION: 68 %
ASCENDING AORTA: 3.7 CM
AV LVOT MEAN GRADIENT: 2 MMHG
AV LVOT PEAK GRADIENT: 3 MMHG
AV MEAN GRADIENT: 3 MMHG
AV PEAK GRADIENT: 6 MMHG
AV VELOCITY RATIO: 0.75
DOP CALC AO PEAK VEL: 1.19 M/S
DOP CALC AO VTI: 26.47 CM
DOP CALC LVOT PEAK VEL VTI: 21.96 CM
DOP CALC LVOT PEAK VEL: 0.89 M/S
E WAVE DECELERATION TIME: 251 MS
FRACTIONAL SHORTENING: 39 % (ref 28–44)
INTERVENTRICULAR SEPTUM IN DIASTOLE (PARASTERNAL SHORT AXIS VIEW): 1.1 CM
INTERVENTRICULAR SEPTUM: 1.1 CM (ref 0.6–1.1)
LAAS-AP2: 15.1 CM2
LAAS-AP4: 18.9 CM2
LEFT ATRIUM SIZE: 4.5 CM
LEFT INTERNAL DIMENSION IN SYSTOLE: 3.1 CM (ref 2.1–4)
LEFT VENTRICULAR INTERNAL DIMENSION IN DIASTOLE: 5.1 CM (ref 3.5–6)
LEFT VENTRICULAR POSTERIOR WALL IN END DIASTOLE: 1 CM
LEFT VENTRICULAR STROKE VOLUME: 88 ML
LVSV (TEICH): 88 ML
MV E'TISSUE VEL-SEP: 12 CM/S
MV PEAK A VEL: 0.84 M/S
MV PEAK E VEL: 95 CM/S
MV STENOSIS PRESSURE HALF TIME: 73 MS
MV VALVE AREA P 1/2 METHOD: 3.01 CM2
RIGHT VENTRICLE ID DIMENSION: 3.2 CM
SL CV LEFT ATRIUM LENGTH A2C: 5 CM
SL CV PED ECHO LEFT VENTRICLE DIASTOLIC VOLUME (MOD BIPLANE) 2D: 126 ML
SL CV PED ECHO LEFT VENTRICLE SYSTOLIC VOLUME (MOD BIPLANE) 2D: 38 ML
TR MAX PG: 16 MMHG
TR PEAK VELOCITY: 2 M/S
TRICUSPID ANNULAR PLANE SYSTOLIC EXCURSION: 2.2 CM
TRICUSPID VALVE PEAK REGURGITATION VELOCITY: 2.02 M/S

## 2023-06-12 ENCOUNTER — OFFICE VISIT (OUTPATIENT)
Dept: FAMILY MEDICINE CLINIC | Facility: CLINIC | Age: 68
End: 2023-06-12
Payer: MEDICARE

## 2023-06-12 VITALS
OXYGEN SATURATION: 98 % | HEART RATE: 75 BPM | WEIGHT: 240 LBS | RESPIRATION RATE: 16 BRPM | BODY MASS INDEX: 42.52 KG/M2 | TEMPERATURE: 97.5 F | HEIGHT: 63 IN

## 2023-06-12 DIAGNOSIS — I10 ESSENTIAL HYPERTENSION: Chronic | ICD-10-CM

## 2023-06-12 DIAGNOSIS — R60.0 BILATERAL LOWER EXTREMITY EDEMA: Primary | ICD-10-CM

## 2023-06-12 DIAGNOSIS — E04.1 NONTOXIC SINGLE THYROID NODULE: ICD-10-CM

## 2023-06-12 DIAGNOSIS — Q21.12 PFO (PATENT FORAMEN OVALE): ICD-10-CM

## 2023-06-12 DIAGNOSIS — M51.36 LUMBAR DEGENERATIVE DISC DISEASE: ICD-10-CM

## 2023-06-12 PROCEDURE — 99214 OFFICE O/P EST MOD 30 MIN: CPT | Performed by: FAMILY MEDICINE

## 2023-06-12 NOTE — PROGRESS NOTES
Name: Shane Borjas      : 1955      MRN: [id number]  Encounter Provider: Wheeler Hashimoto, DO  Encounter Date: 2023   Encounter department: [facility] PRIMARY CARE    Assessment & Plan     1  Bilateral lower extremity edema  -     VAS reflux lower limb venous duplex study with reflux assessment, complete bilateral; Future; Expected date: 2023    2  Essential hypertension  -     TSH, 3rd generation; Future; Expected date: 2023  -     Lipid Panel with Direct LDL reflex; Future; Expected date: 2023  -     Comprehensive metabolic panel; Future; Expected date: 2023    3  Nontoxic single thyroid nodule  -     TSH, 3rd generation; Future; Expected date: 2023    4  Lumbar degenerative disc disease    5  PFO (patent foramen ovale)  Comments:   Mobile interatrial septum with evidence suggestive of patent foramen ovale with left-to-right shunt- echo          Continue efforts at elevating leg  Wear compression knee-high's when able  Venous reflux study suggested  Watch salt  Continue current medication  Follow-up with already set up for       Patient is a 59-year-old female here for follow-up on leg edema  Chief Complaint   Patient presents with   • Leg Swelling     Bilateral leg swelling  Pt has pain and tingling in her legs  Pt has been elevating her legs      Symptoms started after she started drinking 50 oz and then  developed swelling ,  She is to watching salt  Patient is on low-dose amlodipine with the Cozaar  This is probably the thing venous return  Patient does not use compression stockings echocardiogram reviewed  Interpretation Summary    Technically difficult but adequate transthoracic echocardiogram study      1  Normal left ventricle size and systolic and diastolic function  Left ventricle ejection fraction is estimated around 60%  2  Normal right ventricle size and systolic function    3  Normal left and right atrial size  Mobile interatrial septum with evidence suggestive of patent foramen ovale with left-to-right shunt demonstrated by color-flow Doppler  4  Aortic valve sclerosis, no aortic valve stenosis or regurgitation  5  Minimal mitral annular calcification  Mitral valve sclerosis, trace mitral valve regurgitation  6  Trace tricuspid valve regurgitation  7  No obvious pulmonary hypertension  8  Trace, hemodynamically insignificant pericardial effusion      Compared to report of previous echocardiogram from 8/2/2016 there is overall no significant change  Mobile interatrial septum and possible small patent foramen ovale with interatrial shunt was not  noted previously  Back surgery 2000---  Last MRI 2017  LUMBAR DISC SPACES:          L1-L2:  Severe right greater than left facet arthrosis, bridging marginal osteophytes, minor right foraminal stenosis      L2-L3:  Bilateral facet arthrosis, leftward translation of L4 to foramen are patent  Osteophyte in contact with post foraminal right L2 root without compression      L3-L4:  Leftward translation of L3 and bilateral, left greater than right facet arthrosis  Disc and osteophyte extending asymmetrically to the left, no definite root compression      L4-L5:  Asymmetric loss of disc height to the left  Marginal osteophytes and leftward extension of disc osteophyte with severe foraminal stenosis  Correlate for left L4 radiculitis      L5-S1:  Bilateral facet arthrosis, circumferential bulge and marginal osteophytes but     IMPRESSION:     Severe multilevel degenerative changes complicated by left convex thoracolumbar rotoscoliosis and kyphosis  Severe left L4-5 foraminal stenosis, correlate for left L4 radiculitis      Review of Systems   Cardiovascular: Positive for leg swelling (Improved  )  Musculoskeletal: Positive for back pain         Current Outpatient Medications on File Prior to Visit   Medication Sig   • amLODIPine (NORVASC) 2 5 mg tablet TAKE 1 "TABLET BY MOUTH EVERY DAY   • ibuprofen (MOTRIN) 200 mg tablet Take by mouth every 6 (six) hours as needed for mild pain   • losartan (COZAAR) 50 mg tablet Take 1 tablet (50 mg total) by mouth daily   • venlafaxine (EFFEXOR-XR) 150 mg 24 hr capsule Take 1 capsule (150 mg total) by mouth daily       Objective     Pulse 75   Temp 97 5 °F (36 4 °C) (Temporal)   Resp 16   Ht 5' 3\" (1 6 m)   Wt 109 kg (240 lb)   SpO2 98%   BMI 42 51 kg/m²     Physical Exam  Constitutional:       Appearance: Normal appearance  She is normal weight  HENT:      Nose: Nose normal       Mouth/Throat:      Mouth: Mucous membranes are moist    Cardiovascular:      Rate and Rhythm: Normal rate and regular rhythm  Pulses: Normal pulses  Comments: Negative Homans  Pulmonary:      Effort: Pulmonary effort is normal       Breath sounds: Normal breath sounds  Skin:     Findings: No rash  Neurological:      Mental Status: She is alert and oriented to person, place, and time  Psychiatric:         Mood and Affect: Mood normal          Behavior: Behavior normal          Thought Content:  Thought content normal          Judgment: Judgment normal        Prieto Bhatt,   "

## 2023-06-18 PROBLEM — Q21.12 PFO (PATENT FORAMEN OVALE): Status: ACTIVE | Noted: 2023-06-18

## 2023-06-18 PROBLEM — M51.369 LUMBAR DEGENERATIVE DISC DISEASE: Status: ACTIVE | Noted: 2023-06-18

## 2023-06-18 PROBLEM — M51.36 LUMBAR DEGENERATIVE DISC DISEASE: Status: ACTIVE | Noted: 2023-06-18

## 2023-07-07 DIAGNOSIS — E04.1 NONTOXIC SINGLE THYROID NODULE: Primary | ICD-10-CM

## 2023-07-24 ENCOUNTER — HOSPITAL ENCOUNTER (OUTPATIENT)
Dept: NON INVASIVE DIAGNOSTICS | Facility: HOSPITAL | Age: 68
Discharge: HOME | End: 2023-07-24
Payer: MEDICARE

## 2023-07-24 ENCOUNTER — HOSPITAL ENCOUNTER (OUTPATIENT)
Dept: ULTRASOUND IMAGING | Facility: HOSPITAL | Age: 68
Discharge: HOME | End: 2023-07-24
Payer: MEDICARE

## 2023-07-24 DIAGNOSIS — R60.0 BILATERAL LOWER EXTREMITY EDEMA: ICD-10-CM

## 2023-07-24 DIAGNOSIS — E04.1 NONTOXIC SINGLE THYROID NODULE: ICD-10-CM

## 2023-07-24 PROCEDURE — 76536 US EXAM OF HEAD AND NECK: CPT

## 2023-07-24 PROCEDURE — 93970 EXTREMITY STUDY: CPT | Performed by: SURGERY

## 2023-07-24 PROCEDURE — 93970 EXTREMITY STUDY: CPT

## 2023-07-30 LAB
ALBUMIN SERPL-MCNC: 4.4 G/DL (ref 3.6–5.1)
ALBUMIN/GLOB SERPL: 1.9 (CALC) (ref 1–2.5)
ALP SERPL-CCNC: 62 U/L (ref 37–153)
ALT SERPL-CCNC: 21 U/L (ref 6–29)
AST SERPL-CCNC: 15 U/L (ref 10–35)
BILIRUB SERPL-MCNC: 0.7 MG/DL (ref 0.2–1.2)
BUN SERPL-MCNC: 15 MG/DL (ref 7–25)
BUN/CREAT SERPL: NORMAL (CALC) (ref 6–22)
CALCIUM SERPL-MCNC: 8.9 MG/DL (ref 8.6–10.4)
CHLORIDE SERPL-SCNC: 104 MMOL/L (ref 98–110)
CHOLEST SERPL-MCNC: 196 MG/DL
CHOLEST/HDLC SERPL: 3.9 (CALC)
CO2 SERPL-SCNC: 27 MMOL/L (ref 20–32)
CREAT SERPL-MCNC: 0.64 MG/DL (ref 0.5–1.05)
GFR/BSA.PRED SERPLBLD CYS-BASED-ARV: 97 ML/MIN/1.73M2
GLOBULIN SER CALC-MCNC: 2.3 G/DL (CALC) (ref 1.9–3.7)
GLUCOSE SERPL-MCNC: 94 MG/DL (ref 65–99)
HDLC SERPL-MCNC: 50 MG/DL
LDLC SERPL CALC-MCNC: 120 MG/DL (CALC)
NONHDLC SERPL-MCNC: 146 MG/DL (CALC)
POTASSIUM SERPL-SCNC: 3.9 MMOL/L (ref 3.5–5.3)
PROT SERPL-MCNC: 6.7 G/DL (ref 6.1–8.1)
SODIUM SERPL-SCNC: 139 MMOL/L (ref 135–146)
TRIGL SERPL-MCNC: 138 MG/DL
TSH SERPL-ACNC: 1.51 MIU/L (ref 0.4–4.5)

## 2023-08-12 DIAGNOSIS — I10 ESSENTIAL HYPERTENSION: ICD-10-CM

## 2023-08-14 ENCOUNTER — RA CDI HCC (OUTPATIENT)
Dept: OTHER | Facility: HOSPITAL | Age: 68
End: 2023-08-14

## 2023-08-14 RX ORDER — AMLODIPINE BESYLATE 2.5 MG/1
TABLET ORAL
Qty: 90 TABLET | Refills: 1 | Status: SHIPPED | OUTPATIENT
Start: 2023-08-14

## 2023-08-14 NOTE — PROGRESS NOTES
[address] [facility] coding opportunities    E66.01      Chart Reviewed number of suggestions sent to Provider: 1     Patients Insurance     Medicare Insurance: [employer]

## 2023-08-18 ENCOUNTER — OFFICE VISIT (OUTPATIENT)
Dept: FAMILY MEDICINE CLINIC | Facility: CLINIC | Age: 68
End: 2023-08-18
Payer: MEDICARE

## 2023-08-18 VITALS
SYSTOLIC BLOOD PRESSURE: 124 MMHG | WEIGHT: 243.2 LBS | OXYGEN SATURATION: 97 % | HEIGHT: 63 IN | HEART RATE: 87 BPM | RESPIRATION RATE: 16 BRPM | BODY MASS INDEX: 43.09 KG/M2 | TEMPERATURE: 96.9 F | DIASTOLIC BLOOD PRESSURE: 80 MMHG

## 2023-08-18 DIAGNOSIS — Z00.00 MEDICARE ANNUAL WELLNESS VISIT, SUBSEQUENT: Primary | ICD-10-CM

## 2023-08-18 DIAGNOSIS — Z12.31 ENCOUNTER FOR SCREENING MAMMOGRAM FOR BREAST CANCER: ICD-10-CM

## 2023-08-18 DIAGNOSIS — I10 ESSENTIAL HYPERTENSION: Chronic | ICD-10-CM

## 2023-08-18 DIAGNOSIS — E04.1 NONTOXIC SINGLE THYROID NODULE: ICD-10-CM

## 2023-08-18 PROCEDURE — 99213 OFFICE O/P EST LOW 20 MIN: CPT | Performed by: FAMILY MEDICINE

## 2023-08-18 PROCEDURE — G0439 PPPS, SUBSEQ VISIT: HCPCS | Performed by: FAMILY MEDICINE

## 2023-08-18 NOTE — PATIENT INSTRUCTIONS
Medicare Preventive Visit Patient Instructions  Thank you for completing your Welcome to Medicare Visit or Medicare Annual Wellness Visit today. Your next wellness visit will be due in one year (8/18/2024). The screening/preventive services that you may require over the next 5-10 years are detailed below. Some tests may not apply to you based off risk factors and/or age. Screening tests ordered at today's visit but not completed yet may show as past due. Also, please note that scanned in results may not display below. Preventive Screenings:  Service Recommendations Previous Testing/Comments   Colorectal Cancer Screening  * Colonoscopy    * Fecal Occult Blood Test (FOBT)/Fecal Immunochemical Test (FIT)  * Fecal DNA/Cologuard Test  * Flexible Sigmoidoscopy Age: 43-73 years old   Colonoscopy: every 10 years (may be performed more frequently if at higher risk)  OR  FOBT/FIT: every 1 year  OR  Cologuard: every 3 years  OR  Sigmoidoscopy: every 5 years  Screening may be recommended earlier than age 39 if at higher risk for colorectal cancer. Also, an individualized decision between you and your healthcare provider will decide whether screening between the ages of 77-80 would be appropriate. Colonoscopy: 02/13/2023  FOBT/FIT: Not on file  Cologuard: Not on file  Sigmoidoscopy: Not on file    Screening Current     Breast Cancer Screening Age: 36 years old  Frequency: every 1-2 years  Not required if history of left and right mastectomy Mammogram: 08/12/2022    Screening Current   Cervical Cancer Screening Between the ages of 21-29, pap smear recommended once every 3 years. Between the ages of 32-69, can perform pap smear with HPV co-testing every 5 years.    Recommendations may differ for women with a history of total hysterectomy, cervical cancer, or abnormal pap smears in past. Pap Smear: Not on file    Screening Not Indicated   Hepatitis C Screening Once for adults born between 1945 and 1965  More frequently in patients at high risk for Hepatitis C Hep C Antibody: 12/21/2017    Screening Current   Diabetes Screening 1-2 times per year if you're at risk for diabetes or have pre-diabetes Fasting glucose: No results in last 5 years (No results in last 5 years)  A1C: 4.8 % of total Hgb (8/23/2021)  Screening Current   Cholesterol Screening Once every 5 years if you don't have a lipid disorder. May order more often based on risk factors. Lipid panel: 07/29/2023    Screening Current     Other Preventive Screenings Covered by Medicare:  1. Abdominal Aortic Aneurysm (AAA) Screening: covered once if your at risk. You're considered to be at risk if you have a family history of AAA. 2. Lung Cancer Screening: covers low dose CT scan once per year if you meet all of the following conditions: (1) Age 48-67; (2) No signs or symptoms of lung cancer; (3) Current smoker or have quit smoking within the last 15 years; (4) You have a tobacco smoking history of at least 20 pack years (packs per day multiplied by number of years you smoked); (5) You get a written order from a healthcare provider. 3. Glaucoma Screening: covered annually if you're considered high risk: (1) You have diabetes OR (2) Family history of glaucoma OR (3)  aged 48 and older OR (3)  American aged 72 and older  3. Osteoporosis Screening: covered every 2 years if you meet one of the following conditions: (1) You're estrogen deficient and at risk for osteoporosis based off medical history and other findings; (2) Have a vertebral abnormality; (3) On glucocorticoid therapy for more than 3 months; (4) Have primary hyperparathyroidism; (5) On osteoporosis medications and need to assess response to drug therapy. · Last bone density test (DXA Scan): 12/20/2016.  5. HIV Screening: covered annually if you're between the age of 15-65. Also covered annually if you are younger than 13 and older than 72 with risk factors for HIV infection.  For pregnant patients, it is covered up to 3 times per pregnancy. Immunizations:  Immunization Recommendations   Influenza Vaccine Annual influenza vaccination during flu season is recommended for all persons aged >= 6 months who do not have contraindications   Pneumococcal Vaccine   * Pneumococcal conjugate vaccine = PCV13 (Prevnar 13), PCV15 (Vaxneuvance), PCV20 (Prevnar 20)  * Pneumococcal polysaccharide vaccine = PPSV23 (Pneumovax) Adults 20-63 years old: 1-3 doses may be recommended based on certain risk factors  Adults 72 years old: 1-2 doses may be recommended based off what pneumonia vaccine you previously received   Hepatitis B Vaccine 3 dose series if at intermediate or high risk (ex: diabetes, end stage renal disease, liver disease)   Tetanus (Td) Vaccine - COST NOT COVERED BY MEDICARE PART B Following completion of primary series, a booster dose should be given every 10 years to maintain immunity against tetanus. Td may also be given as tetanus wound prophylaxis. Tdap Vaccine - COST NOT COVERED BY MEDICARE PART B Recommended at least once for all adults. For pregnant patients, recommended with each pregnancy. Shingles Vaccine (Shingrix) - COST NOT COVERED BY MEDICARE PART B  2 shot series recommended in those aged 48 and above     Health Maintenance Due:      Topic Date Due   • Breast Cancer Screening: Mammogram  08/12/2023   • Colorectal Cancer Screening  02/13/2026   • Hepatitis C Screening  Completed     Immunizations Due:      Topic Date Due   • COVID-19 Vaccine (4 - Moderna series) 02/21/2022   • Influenza Vaccine (1) 09/01/2023     Advance Directives   What are advance directives? Advance directives are legal documents that state your wishes and plans for medical care. These plans are made ahead of time in case you lose your ability to make decisions for yourself. Advance directives can apply to any medical decision, such as the treatments you want, and if you want to donate organs.    What are the types of advance directives? There are many types of advance directives, and each state has rules about how to use them. You may choose a combination of any of the following:  · Living will: This is a written record of the treatment you want. You can also choose which treatments you do not want, which to limit, and which to stop at a certain time. This includes surgery, medicine, IV fluid, and tube feedings. · Durable power of  for [facility]): This is a written record that states who you want to make healthcare choices for you when you are unable to make them for yourself. This person, called a proxy, is usually a family member or a friend. You may choose more than 1 proxy. · Do not resuscitate (DNR) order:  A DNR order is used in case your heart stops beating or you stop breathing. It is a request not to have certain forms of treatment, such as CPR. A DNR order may be included in other types of advance directives. · Medical directive: This covers the care that you want if you are in a coma, near death, or unable to make decisions for yourself. You can list the treatments you want for each condition. Treatment may include pain medicine, surgery, blood transfusions, dialysis, IV or tube feedings, and a ventilator (breathing machine). · Values history: This document has questions about your views, beliefs, and how you feel and think about life. This information can help others choose the care that you would choose. Why are advance directives important? An advance directive helps you control your care. Although spoken wishes may be used, it is better to have your wishes written down. Spoken wishes can be misunderstood, or not followed. Treatments may be given even if you do not want them. An advance directive may make it easier for your family to make difficult choices about your care.    Weight Management   Why it is important to manage your weight:  Being overweight increases your risk of health conditions such as heart disease, high blood pressure, type 2 diabetes, and certain types of cancer. It can also increase your risk for osteoarthritis, sleep apnea, and other respiratory problems. Aim for a slow, steady weight loss. Even a small amount of weight loss can lower your risk of health problems. How to lose weight safely:  A safe and healthy way to lose weight is to eat fewer calories and get regular exercise. You can lose up about 1 pound a week by decreasing the number of calories you eat by 500 calories each day. Healthy meal plan for weight management:  A healthy meal plan includes a variety of foods, contains fewer calories, and helps you stay healthy. A healthy meal plan includes the following:  · Eat whole-grain foods more often. A healthy meal plan should contain fiber. Fiber is the part of grains, fruits, and vegetables that is not broken down by your body. Whole-grain foods are healthy and provide extra fiber in your diet. Some examples of whole-grain foods are whole-wheat breads and pastas, oatmeal, brown rice, and bulgur. · Eat a variety of vegetables every day. Include dark, leafy greens such as spinach, kale, candice greens, and mustard greens. Eat yellow and orange vegetables such as carrots, sweet potatoes, and winter squash. · Eat a variety of fruits every day. Choose fresh or canned fruit (canned in its own juice or light syrup) instead of juice. Fruit juice has very little or no fiber. · Eat low-fat dairy foods. Drink fat-free (skim) milk or 1% milk. Eat fat-free yogurt and low-fat cottage cheese. Try low-fat cheeses such as mozzarella and other reduced-fat cheeses. · Choose meat and other protein foods that are low in fat. Choose beans or other legumes such as split peas or lentils. Choose fish, skinless poultry (chicken or turkey), or lean cuts of red meat (beef or pork). Before you cook meat or poultry, cut off any visible fat. · Use less fat and oil.   Try baking foods instead of frying them. Add less fat, such as margarine, sour cream, regular salad dressing and mayonnaise to foods. Eat fewer high-fat foods. Some examples of high-fat foods include french fries, doughnuts, ice cream, and cakes. · Eat fewer sweets. Limit foods and drinks that are high in sugar. This includes candy, cookies, regular soda, and sweetened drinks. Exercise:  Exercise at least 30 minutes per day on most days of the week. Some examples of exercise include walking, biking, dancing, and swimming. You can also fit in more physical activity by taking the stairs instead of the elevator or parking farther away from stores. Ask your healthcare provider about the best exercise plan for you. © Copyright [employer] 2018 Information is for End User's use only and may not be sold, redistributed or otherwise used for commercial purposes.  All illustrations and images included in CareNotes® are the copyrighted property of [employer] or  [address]

## 2023-08-18 NOTE — PROGRESS NOTES
Assessment and Plan:   Alice Mireles was seen today for medicare wellness visit. Diagnoses and all orders for this visit:    Medicare annual wellness visit, subsequent    Essential hypertension    Encounter for screening mammogram for breast cancer    Nontoxic single thyroid nodule-LEFT  Comments:  Continue surveillance every 2 years. BMI 40.0-44.9, adult [facility])    The current medical regimen is effective;  continue present plan and medications. The patient is asked to continue to make an attempt to improve diet and exercise patterns to aid in medical management of this problem. Problem List Items Addressed This Visit        Endocrine    Nontoxic single thyroid nodule       Cardiovascular and Mediastinum    Essential hypertension (Chronic)       Other    BMI 40.0-44.9, adult ([address])   Other Visit Diagnoses     Medicare annual wellness visit, subsequent    -  Primary    Encounter for screening mammogram for breast cancer            BMI Counseling: Body mass index is 43.64 kg/m². The BMI is above normal. Nutrition recommendations include decreasing portion sizes, encouraging healthy choices of fruits and vegetables, decreasing fast food intake, consuming healthier snacks, limiting drinks that contain sugar, moderation in carbohydrate intake, increasing intake of lean protein, reducing intake of saturated and trans fat and reducing intake of cholesterol. Exercise recommendations include exercising 3-5 times per week. Patient referred to PCP. Rationale for BMI follow-up plan is due to patient being overweight or obese. Depression Screening and Follow-up Plan: Patient was screened for depression during today's encounter. They screened negative with a PHQ-2 score of 0. Preventive health issues were discussed with patient, and age appropriate screening tests were ordered as noted in patient's After Visit Summary.   Personalized health advice and appropriate referrals for health education or preventive services given if needed, as noted in patient's After Visit Summary. History of Present Illness:     Patient presents for a Medicare Wellness Visit  Chief Complaint   Patient presents with   • Medicare Wellness Visit     Patient is a 78-year-old female here for Medicare wellness   She is up-to-date on mammogram.  We reviewed her recent ultrasound of the thyroid which shows stable thyroid nodules. IMPRESSION: July 24, 2023     No interval change of dominant left mid/upper pole nodule. This nodule remains stable dating back to 2017. Although by ACR criteria sampling is recommended, given long-term stability this nodule is most likely benign. Consider ultrasound follow-up in 2   years or as clinically indicated. Lab work is also reviewed. We also reviewed her vascular reflux lower limb venous duplex study. This was done based on her history of edema. 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 Currently her symptoms are not disabling. She will try to use the compressive knee-high's.   She is aware to call us if she is interested in seeing vascular for endoscopic procedure  Patient Care Team:  Tom Peacock DO as PCP - General  Tom Peacock DO as PCP - [address] [place] [address] (RTE)     Review of Systems:     Component      Latest Ref Rng 7/29/2023   Glucose, Random      65 - 99 mg/dL 94    BUN      7 - 25 mg/dL 15    Creatinine      0.50 - 1.05 mg/dL 0.64    eGFR      > OR = 60 mL/min/1.73m2 97    SL AMB BUN/CREATININE RATIO      6 - 22 (calc) NOT APPLICABLE    Sodium      135 - 146 mmol/L 139    Potassium      3.5 - 5.3 mmol/L 3.9    Chloride      98 - 110 mmol/L 104    CO2      20 - 32 mmol/L 27    Calcium      8.6 - 10.4 mg/dL 8.9    Total Protein      6.1 - 8.1 g/dL 6.7    Albumin      3.6 - 5.1 g/dL 4.4    Globulin      1.9 - 3.7 g/dL (calc) 2.3    Albumin/Globulin Ratio      1.0 - 2.5 (calc) 1.9    TOTAL BILIRUBIN      0.2 - 1.2 mg/dL 0.7    Alkaline Phosphatase      37 - 153 U/L 62    AST      10 - 35 U/L 15    ALT      6 - 29 U/L 21    Cholesterol      <200 mg/dL 196    HDL      > OR = 50 mg/dL 50    Triglycerides      <150 mg/dL 138    LDL Calculated      mg/dL (calc) 120 (H)    Chol HDLC Ratio      <5.0 (calc) 3.9    Non-HDL Cholesterol      <130 mg/dL (calc) 146 (H)    TSH, POC      0.40 - 4.50 mIU/L 1.51       :Review of Systems   All other systems reviewed and are negative. Problem List:     Patient Active Problem List   Diagnosis   • Essential hypertension   • Cervical radiculopathy   • Irritable bowel syndrome   • Nontoxic single thyroid nodule   • Vitamin D deficiency   • Vitamin B12 deficiency   • Menopausal symptoms   • Anxiety   • Primary localized osteoarthritis of pelvic region and thigh   • Hx of total hip arthroplasty, right   • Chronic back pain   • BMI 40.0-44.9, adult (HCC)   • KENY (obstructive sleep apnea)   • Lumbar degenerative disc disease   • PFO (patent foramen ovale)      Past Medical and Surgical History:     Past Medical History:   Diagnosis Date   • ACE-inhibitor cough     Last assessed 12/04/15   • Anxiety    • Arthritis    • Chronic pain disorder    • Depression    • Disease of thyroid gland     benign nodule   • History of transfusion     no adverse reaction   • Hypertension    • Wears glasses      Past Surgical History:   Procedure Laterality Date   •  SECTION     • CHOLECYSTECTOMY     • COLONOSCOPY  2017    Dr. Denice Greene. Hyperplastic sigmoid polyp. • COLONOSCOPY      Dr. Denice Greene. Hyperplastic rectal polyp. • COLONOSCOPY      Dr. Matt Pitts. Tubular adenoma polyps in the ascending and descending colon. • Cristela Pitts. Normal exam.   • EGD      Dr. Denice Greene. Normal exam, negative H. pylori.    • JOINT REPLACEMENT Left     hip   • AK ARTHRP ACETBLR/PROX FEM PROSTC AGRFT/ALGRFT Right 07/10/2018    Procedure: ARTHROPLASTY HIP TOTAL;  Surgeon: Andrew Herbert MD;  Location: [facility]; Service: Orthopedics   • TONSILLECTOMY AND ADENOIDECTOMY     • TUBAL LIGATION     • VAGINAL HYSTERECTOMY Bilateral 2007    nd ooperectomy      Family History:     Family History   Problem Relation Age of Onset   • Aortic stenosis Mother    • Heart murmur Mother    • Hypertension Mother    • Heart disease Mother    • Colon polyps Mother    • Hypertension Father    • Cancer Father         head and neck spread to spine and brain   • Skin cancer Father    • Hypertension Sister    • Hypertension Brother    • Skin cancer Brother    • Hyperlipidemia Brother    • Hypertension Family    • Coronary artery disease Family         Stenosis   • Thyroid disease Neg Hx    • Diabetes Neg Hx       Social History:     Social History     Socioeconomic History   • Marital status:      Spouse name: None   • Number of children: None   • Years of education: None   • Highest education level: None   Occupational History   • Occupation: administrative   Tobacco Use   • Smoking status: Former     Types: Cigarettes     Start date:      Quit date:      Years since quittin.6   • Smokeless tobacco: Never   Substance and Sexual Activity   • Alcohol use: Yes     Alcohol/week: 1.0 - 2.0 standard drink of alcohol     Types: 1 - 2 Standard drinks or equivalent per week   • Drug use: No   • Sexual activity: None   Other Topics Concern   • None   Social History Narrative    As per allscripts: Single     Social Determinants of Health     Financial Resource Strain: Low Risk  (2023)    Overall Financial Resource Strain (CARDIA)    • Difficulty of Paying Living Expenses: Not very hard   Food Insecurity: Not on file   Transportation Needs: No Transportation Needs (2023)    PRAPARE - Transportation    • Lack of Transportation (Medical): No    • Lack of Transportation (Non-Medical):  No   Physical Activity: Not on file   Stress: Not on file   Social Connections: Not on file   Intimate Partner Violence: Not on file   Housing Stability: Not on file      Medications and Allergies:     Current Outpatient Medications   Medication Sig Dispense Refill   • amLODIPine (NORVASC) 2.5 mg tablet TAKE 1 TABLET BY MOUTH EVERY DAY 90 tablet 1   • ibuprofen (MOTRIN) 200 mg tablet Take by mouth every 6 (six) hours as needed for mild pain     • losartan (COZAAR) 50 mg tablet Take 1 tablet (50 mg total) by mouth daily 90 tablet 3   • venlafaxine (EFFEXOR-XR) 150 mg 24 hr capsule Take 1 capsule (150 mg total) by mouth daily 90 capsule 3     No current facility-administered medications for this visit. Allergies   Allergen Reactions   • Lisinopril Cough   • Ace Inhibitors Cough     Category: Adverse Reaction;       Immunizations:     Immunization History   Administered Date(s) Administered   • COVID-19 MODERNA VACC 0.5 ML IM 04/19/2021, 05/19/2021, 12/27/2021   • INFLUENZA 11/30/2022   • Influenza, high dose seasonal 0.7 mL 12/20/2021, 12/23/2022   • Pneumococcal Polysaccharide PPV23 07/29/2021   • Tdap 06/02/2016, 06/02/2016, 12/13/2021   • Tuberculin Skin Test-PPD Intradermal 07/16/2018      Health Maintenance:         Topic Date Due   • Breast Cancer Screening: Mammogram  08/12/2023   • Colorectal Cancer Screening  02/13/2026   • Hepatitis C Screening  Completed         Topic Date Due   • COVID-19 Vaccine (4 - [place] series) 02/21/2022   • Influenza Vaccine (1) 09/01/2023      Medicare Screening Tests and Risk Assessments:     Estrada Garcia is here for her Subsequent Wellness visit. Health Risk Assessment:   Patient rates overall health as good. Patient feels that their physical health rating is slightly worse. Patient is satisfied with their life. Eyesight was rated as slightly worse. Hearing was rated as same. Patient feels that their emotional and mental health rating is same. Patients states they are never, rarely angry. Patient states they are sometimes unusually tired/fatigued. Pain experienced in the last 7 days has been some.  Patient's pain rating has been 4/10. Patient states that she has experienced no weight loss or gain in last 6 months. Depression Screening:   PHQ-2 Score: 0      Fall Risk Screening: In the past year, patient has experienced: no history of falling in past year      Urinary Incontinence Screening:   Patient has not leaked urine accidently in the last six months. Home Safety:  Patient does not have trouble with stairs inside or outside of their home. Patient has working smoke alarms and has working carbon monoxide detector. Home safety hazards include: none. Nutrition:   Current diet is Regular. Medications:   Patient is currently taking over-the-counter supplements. OTC medications include: Vitamin C, Vitamin D. Patient is able to manage medications. Activities of Daily Living (ADLs)/Instrumental Activities of Daily Living (IADLs):   Walk and transfer into and out of bed and chair?: Yes  Dress and groom yourself?: Yes    Bathe or shower yourself?: Yes    Feed yourself? Yes  Do your laundry/housekeeping?: Yes  Manage your money, pay your bills and track your expenses?: Yes  Make your own meals?: Yes    Do your own shopping?: Yes    Durable Medical Equipment Suppliers  [employer]    Previous Hospitalizations:   Any hospitalizations or ED visits within the last 12 months?: No      Advance Care Planning:   Living will: Yes    Durable POA for healthcare:  Yes    Advanced directive: Yes    End of Life Decisions reviewed with patient: Yes    Provider agrees with end of life decisions: Yes      Cognitive Screening:   Provider or family/friend/caregiver concerned regarding cognition?: No    PREVENTIVE SCREENINGS      Cardiovascular Screening:    General: Screening Current      Diabetes Screening:     General: Screening Current      Colorectal Cancer Screening:     General: Screening Current      Breast Cancer Screening:     General: Screening Current      Cervical Cancer Screening:    General: Screening Not Indicated Osteoporosis Screening:    General: Risks and Benefits Discussed      Abdominal Aortic Aneurysm (AAA) Screening:        General: Screening Not Indicated      Lung Cancer Screening:     General: Screening Not Indicated      Hepatitis C Screening:    General: Screening Current    Hep C Screening Accepted: No     Screening, Brief Intervention, and Referral to Treatment (SBIRT)    Screening  Typical number of drinks in a day: 0  Typical number of drinks in a week: 2  Interpretation: Low risk drinking behavior. AUDIT-C Screenin) How often did you have a drink containing alcohol in the past year? 2 to 4 times a month  2) How many drinks did you have on a typical day when you were drinking in the past year? 1 to 2  3) How often did you have 6 or more drinks on one occasion in the past year? never    AUDIT-C Score: 2  Interpretation: Score 0-2 (female): Negative screen for alcohol misuse    Single Item Drug Screening:  How often have you used an illegal drug (including marijuana) or a prescription medication for non-medical reasons in the past year? never    Single Item Drug Screen Score: 0  Interpretation: Negative screen for possible drug use disorder    No results found. Physical Exam:     /80   Pulse 87   Temp (!) 96.9 °F (36.1 °C) (Temporal)   Resp 16   Ht 5' 2.6" (1.59 m)   Wt 110 kg (243 lb 3.2 oz)   SpO2 97%   BMI 43.64 kg/m²     Physical Exam  Vitals and nursing note reviewed. Constitutional:       General: She is not in acute distress. Appearance: Normal appearance. She is well-developed. Comments: 41-year-old female who appears younger than her stated age   HENT:      Mouth/Throat:      Mouth: Mucous membranes are moist.   Eyes:      Conjunctiva/sclera: Conjunctivae normal.   Neck:      Vascular: No carotid bruit. Cardiovascular:      Rate and Rhythm: Normal rate and regular rhythm. Pulses: Normal pulses. Heart sounds: No murmur heard.   Pulmonary:      Effort: Pulmonary effort is normal. No respiratory distress. Breath sounds: Normal breath sounds. Abdominal:      Palpations: Abdomen is soft. Tenderness: There is no abdominal tenderness. Comments: No bruit   Musculoskeletal:         General: No swelling. Neurological:      Mental Status: She is alert and oriented to person, place, and time. Psychiatric:         Mood and Affect: Mood normal.         Behavior: Behavior normal.         Thought Content:  Thought content normal.         Judgment: Judgment normal.          Norma Kim, DO

## 2023-08-22 ENCOUNTER — TELEPHONE (OUTPATIENT)
Dept: ADMINISTRATIVE | Facility: OTHER | Age: 68
End: 2023-08-22

## 2023-08-22 DIAGNOSIS — F41.9 ANXIETY: ICD-10-CM

## 2023-08-22 DIAGNOSIS — I10 BENIGN ESSENTIAL HYPERTENSION: ICD-10-CM

## 2023-08-22 RX ORDER — VENLAFAXINE HYDROCHLORIDE 150 MG/1
150 CAPSULE, EXTENDED RELEASE ORAL DAILY
Qty: 90 CAPSULE | Refills: 0 | Status: SHIPPED | OUTPATIENT
Start: 2023-08-22

## 2023-08-22 RX ORDER — LOSARTAN POTASSIUM 50 MG/1
50 TABLET ORAL DAILY
Qty: 90 TABLET | Refills: 0 | Status: SHIPPED | OUTPATIENT
Start: 2023-08-22

## 2023-08-22 NOTE — TELEPHONE ENCOUNTER
Upon review of the In Basket request we were able to locate, review, and update the patient chart as requested for Mammogram.    Any additional questions or concerns should be emailed to the Practice Liaisons via the appropriate education email address, please do not reply via In Basket.     Thank you  Rodger Eller

## 2023-08-22 NOTE — TELEPHONE ENCOUNTER
----- Message from [facility] sent at 8/22/2023 11:15 AM EDT -----  08/22/23 11:15 AM    Hello, our patient Nelsy Diego has had Mammogram completed/performed. Please assist in updating the patient chart by pulling the Care Everywhere (CE) document. The date of service is 08/18/2023.      Thank you,  Selam Worley

## 2023-09-08 ENCOUNTER — OFFICE VISIT (OUTPATIENT)
Dept: SLEEP CENTER | Facility: CLINIC | Age: 68
End: 2023-09-08
Payer: MEDICARE

## 2023-09-08 VITALS
WEIGHT: 246 LBS | HEART RATE: 72 BPM | BODY MASS INDEX: 45.27 KG/M2 | SYSTOLIC BLOOD PRESSURE: 124 MMHG | HEIGHT: 62 IN | DIASTOLIC BLOOD PRESSURE: 88 MMHG

## 2023-09-08 DIAGNOSIS — R40.0 DAYTIME SLEEPINESS: ICD-10-CM

## 2023-09-08 DIAGNOSIS — I10 ESSENTIAL HYPERTENSION: ICD-10-CM

## 2023-09-08 DIAGNOSIS — M54.50 CHRONIC BILATERAL LOW BACK PAIN WITHOUT SCIATICA: ICD-10-CM

## 2023-09-08 DIAGNOSIS — F45.8 BRUXISM: ICD-10-CM

## 2023-09-08 DIAGNOSIS — G47.61 PLMD (PERIODIC LIMB MOVEMENT DISORDER): ICD-10-CM

## 2023-09-08 DIAGNOSIS — G47.33 OSA (OBSTRUCTIVE SLEEP APNEA): Primary | ICD-10-CM

## 2023-09-08 DIAGNOSIS — G89.29 CHRONIC BILATERAL LOW BACK PAIN WITHOUT SCIATICA: ICD-10-CM

## 2023-09-08 DIAGNOSIS — R00.2 PALPITATIONS: ICD-10-CM

## 2023-09-08 PROCEDURE — 99214 OFFICE O/P EST MOD 30 MIN: CPT | Performed by: INTERNAL MEDICINE

## 2023-09-08 RX ORDER — MELOXICAM 15 MG/1
15 TABLET ORAL DAILY
COMMUNITY
Start: 2023-08-22

## 2023-09-08 NOTE — PATIENT INSTRUCTIONS

## 2023-09-08 NOTE — PROGRESS NOTES
Follow-Up Note - [id number] [facility]  80 y.o. female  :1955  TTA:[id number]  DOS:2023    CC: I saw this patient for follow-up in clinic today for Sleep disordered breathing, Coexisting Sleep and Medical Problems. Interval changes: Continues to report daytime fatigue. She has a loaner machine that she is not using consistently. Results of prior studies in : PSG demonstrated   obstructive sleep apnea: AHI  13.7 /hour. .  Severity may be under stated because she had no stage REM. Moderate intensity  snoring  was noted and there were also 6 respiratory effort-related arousal.  Minimum oxygen saturation 89% . There was severe periodic limb movements of sleep for an index of 64.4 per hour. Sleep latency was short suggesting sleep deprivation. The subsequent therapeutic study demonstrated sleep disordered breathing was adequately remediated with PAP at 13 cm H2O. She was observed while supine but not during stage REM at the final pressure. Periodic limb movements of sleep were eliminated. She had short sleep and REM latencies and high sleep efficiency suggesting sleep deprivation     PFSH, Problem List, Medications & Allergies were reviewed in EMR. She  has a past medical history of ACE-inhibitor cough, Anxiety, Arthritis, Chronic pain disorder, Depression, Disease of thyroid gland, History of transfusion, Hypertension, and Wears glasses. She has a current medication list which includes the following prescription(s): amlodipine, losartan, venlafaxine, ibuprofen, and meloxicam.    PHYSIOLOGICAL DATA REVIEW : Device has been used 58/90 days. Using PAP > 4 hours/night 58%. Estimated ELIZABETH 0.9/hour with pressure of 13cm Kym@Sensbeat percentile; patient has not been using non FDA approved devices to sanitize the machine.   INTERPRETATION: Compliance is fair; Pressure setting is:optimal; ;   SUBJECTIVE: With respect to use of PAP, Maliha Pal  is experiencing some adverse effects:dry mouth/throat. She derives benefit. Is satisfied with sleep and daytime function. Sleep Routine: Kristine Villalobos reports getting 7-8 hrs sleep; she has no difficulty initiating or maintaining sleep . She arises needing an alarm and usually feels refreshed. Kristine Villalobos reports constant fatigue, but no excessive daytime sleepiness, [feels like napping but does not have the opportunity.] She rated [herself] at Total score: 7 /24 on the [place] Sleepiness Scale. Other issues: Periodic limb movements are not disturbing sleep. She grinds her teeth during sleep. Habits:[ reports that she quit smoking about 33 years ago. Her smoking use included cigarettes. She started smoking about 49 years ago. She has never used smokeless tobacco.], [ reports current alcohol use of about 1.0 - 2.0 standard drink of alcohol per week.], [ reports no history of drug use.], Caffeine use:moderate; Exercise routine: none. ROS: Significant for few pounds weight gain. She is reporting no nasal, respiratory or cardiac symptoms. Back pain is not disturbing sleep. She feels mood is stable on venlafaxine. Eym Herrera EXAM: /88 (BP Location: Left arm, Patient Position: Sitting, Cuff Size: Large)   Pulse 72   Ht 5' 2" (1.575 m)   Wt 112 kg (246 lb)   BMI 44.99 kg/m²     Wt Readings from Last 3 Encounters:   09/08/23 112 kg (246 lb)   08/18/23 110 kg (243 lb 3.2 oz)   06/12/23 109 kg (240 lb)      Patient is well groomed; well appearing. CNS: Alert, orientated, speech clear & coherent  Psych: cooperative and in no distress. Mental state: Appears normal.  H&N: EOMI; NC/AT: No facial pressure marks, no rashes. Skin/Extrem: col & hydration normal; no edema  Resp: Respiratory effort is normal  Physical findings otherwise essentially unchanged from previous. IMPRESSION: Problem List Items & Comorbidities Addressed this Visit    1. KENY (obstructive sleep apnea)  PAP DME Resupply/Reorder      2. PLMD (periodic limb movement disorder)        3. Bruxism        4. Daytime sleepiness        5. Palpitations        6. Essential hypertension        7. Chronic bilateral low back pain without sciatica        8. BMI 40.0-44.9, adult ([address])            PLAN:  1. I reviewed results of prior studies and physiologic data with the patient. 2. I discussed treatment options with risks and benefits. 3. Treatment with  PAP is medically necessary and Amy Socks is agreable to continue use. 4. Care of equipment, methods to improve comfort using PAP and importance of compliance with therapy were discussed. 5. Pressure setting: continue 13 cmH2O.    6. Rx provided to replace supplies and Care coordinated with DME provider. 7. No medication is needed for the periodic limb movements of sleep. 8. Discussed strategies for weight reduction and also advised starting an exercise routine. 9. If symptoms persist, she may benefit from reviewing of her medications. 10. Follow-up is advised in 1 year or sooner if needed to monitor progress, compliance and to adjust therapy. Thank you for allowing me to participate in the care of this patient. Sincerely,     Authenticated electronically on 32/98/79   Board Certified Specialist     Portions of the record may have been created with voice recognition software. Occasional wrong word or "sound a like" substitutions may have occurred due to the inherent limitations of voice recognition software. There may also be notations and random deletions of words or characters from malfunctioning software. Read the chart carefully and recognize, using context, where substitutions/deletions have occurred.

## 2023-09-11 ENCOUNTER — TELEPHONE (OUTPATIENT)
Dept: SLEEP CENTER | Facility: CLINIC | Age: 68
End: 2023-09-11

## 2023-09-12 DIAGNOSIS — I10 BENIGN ESSENTIAL HYPERTENSION: ICD-10-CM

## 2023-09-12 LAB

## 2023-09-12 RX ORDER — LOSARTAN POTASSIUM 50 MG/1
50 TABLET ORAL DAILY
Qty: 90 TABLET | Refills: 3 | Status: SHIPPED | OUTPATIENT
Start: 2023-09-12

## 2023-10-14 DIAGNOSIS — F41.9 ANXIETY: ICD-10-CM

## 2023-10-16 RX ORDER — VENLAFAXINE HYDROCHLORIDE 150 MG/1
150 CAPSULE, EXTENDED RELEASE ORAL DAILY
Qty: 90 CAPSULE | Refills: 3 | Status: SHIPPED | OUTPATIENT
Start: 2023-10-16

## 2023-11-01 ENCOUNTER — OFFICE VISIT (OUTPATIENT)
Dept: FAMILY MEDICINE CLINIC | Facility: CLINIC | Age: 68
End: 2023-11-01
Payer: MEDICARE

## 2023-11-01 VITALS
HEIGHT: 63 IN | TEMPERATURE: 96.5 F | BODY MASS INDEX: 43.62 KG/M2 | DIASTOLIC BLOOD PRESSURE: 88 MMHG | SYSTOLIC BLOOD PRESSURE: 144 MMHG | RESPIRATION RATE: 16 BRPM | OXYGEN SATURATION: 98 % | WEIGHT: 246.2 LBS | HEART RATE: 71 BPM

## 2023-11-01 DIAGNOSIS — G89.29 CHRONIC PAIN OF RIGHT KNEE: ICD-10-CM

## 2023-11-01 DIAGNOSIS — F41.9 ANXIETY: ICD-10-CM

## 2023-11-01 DIAGNOSIS — M25.561 CHRONIC PAIN OF RIGHT KNEE: ICD-10-CM

## 2023-11-01 DIAGNOSIS — N95.1 MENOPAUSAL SYMPTOMS: ICD-10-CM

## 2023-11-01 DIAGNOSIS — R42 VERTIGO: Primary | ICD-10-CM

## 2023-11-01 PROCEDURE — 99214 OFFICE O/P EST MOD 30 MIN: CPT | Performed by: FAMILY MEDICINE

## 2023-11-01 RX ORDER — BUPROPION HYDROCHLORIDE 150 MG/1
150 TABLET ORAL EVERY MORNING
Qty: 30 TABLET | Refills: 1 | Status: SHIPPED | OUTPATIENT
Start: 2023-11-01 | End: 2024-04-29

## 2023-11-01 NOTE — PROGRESS NOTES
Name: Garcia Dunn      : 1955      MRN: [id number]  Encounter Provider: Enrique Chavez DO  Encounter Date: 2023   Encounter department: [facility] PRIMARY CARE    Assessment & Plan     1. Vertigo  -     MRI brain IAC wo and w contrast; Future; Expected date: 2023    2. Menopausal symptoms    3. Anxiety  -     buPROPion (WELLBUTRIN XL) 150 mg 24 hr tablet; Take 1 tablet (150 mg total) by mouth every morning    4. Chronic pain of right knee  -     XR knee 3 vw right non injury; Future; Expected date: 2023         I have recommended to the patient if she like to follow-up with the adrenal fatigue with the [facility] in [address] [place] [address], referral could be initiated. With regards to the continued vertigo and failure on vestibular treatment check MRI with a IACs. With regards to ongoing anxiety patient had done well with Wellbutrin in the past we will add that to current regimen. With regards to knee pain,  Check x-ray and also reviewed AAOS handout literature on knee conditioning exercises  Subjective      Dizziness  This is a new problem. The current episode started more than 1 month ago (July after prednisone). The problem has been gradually worsening. Associated symptoms include vertigo. Associated symptoms comments: Weight gain-- . The symptoms are aggravated by stress (work is work, positional). Treatments tried: She has already learned previously the Epley maneuvers with vestibular training. The treatment provided no relief. Anxiety  Presents for initial (always a nervous) visit. Onset was 1 to 6 months ago. Symptoms include dizziness, excessive worry, muscle tension, nervous/anxious behavior and restlessness. Symptoms occur most days. The severity of symptoms is interfering with daily activities. The symptoms are aggravated by work stress (Questionable still menopausal). The quality of sleep is fair.  Nighttime awakenings: occasional, several. Past treatments include SSRIs and non-SSRI antidepressants (Patient had been on Wellbutrin sometime in the past was discontinued secondary to price discussed reinstituting.). Compliance with prior treatments has been good. Knee Pain   Incident onset: last month, right. There was no injury mechanism. The pain is present in the right knee. The quality of the pain is described as aching and cramping. The pain is at a severity of 7/10. The pain is moderate. The pain has been Fluctuating since onset. Associated symptoms include an inability to bear weight. The symptoms are aggravated by movement and weight bearing. She has tried acetaminophen, ice and rest for the symptoms. Going to try a cleansing-for anxiety. Chief Complaint   Patient presents with    Dizziness     Pt is having dizziness times 3 months. Pt also has right knee pain and her anxiety has increased and would lorraine to discuss medication       Review of Systems   Musculoskeletal:         Knee pain as noted in HPI   Neurological:  Positive for dizziness and vertigo. Psychiatric/Behavioral:  The patient is nervous/anxious. All other systems reviewed and are negative. Review saliva test adrenal fatigue-- pain, dry, gain weight, depressed  Grinding teeth, anxiety through the roof. Patient had done vestibular rehab as noted below. Patient also had seen Dr. Neha Srerano and had testing done.   Results are in media on PDF ("adrenal fatigue) "  hysical Therapy / Rehabilitation Diagnoses   Dizziness   ICD-10   R42- Dizziness & Giddiness      Procedures   PT NEURO EVAL  [facility] [address] [facility]   Phone: [phone]   Fax: [phone]  Windy Edwards, PT        Current Outpatient Medications on File Prior to Visit   Medication Sig    amLODIPine (NORVASC) 2.5 mg tablet TAKE 1 TABLET BY MOUTH EVERY DAY    losartan (COZAAR) 50 mg tablet TAKE 1 TABLET BY MOUTH EVERY DAY    meloxicam (MOBIC) 15 mg tablet Take 15 mg by mouth daily    venlafaxine (EFFEXOR-XR) 150 mg 24 hr capsule TAKE 1 CAPSULE BY MOUTH EVERY DAY    ibuprofen (MOTRIN) 200 mg tablet Take by mouth every 6 (six) hours as needed for mild pain (Patient not taking: Reported on 9/8/2023)       Objective     /88   Pulse 71   Temp (!) 96.5 °F (35.8 °C) (Temporal)   Resp 16   Ht 5' 2.6" (1.59 m)   Wt 112 kg (246 lb 3.2 oz)   SpO2 98%   BMI 44.17 kg/m²     Physical Exam  Constitutional:       General: She is not in acute distress. Appearance: Normal appearance. She is well-developed. HENT:      Head: Normocephalic. Right Ear: Tympanic membrane normal.      Left Ear: Tympanic membrane normal.      Nose: Nose normal.      Mouth/Throat:      Mouth: Mucous membranes are moist.   Eyes:      Extraocular Movements:      Right eye: Nystagmus present. Left eye: Nystagmus (With positional change horizontal bilateral) present. Pupils: Pupils are equal, round, and reactive to light. Neck:      Vascular: No carotid bruit. Cardiovascular:      Rate and Rhythm: Normal rate and regular rhythm. Pulses: Normal pulses. Heart sounds: No murmur heard. Pulmonary:      Effort: Pulmonary effort is normal.      Breath sounds: Normal breath sounds. Abdominal:      General: Bowel sounds are normal.      Palpations: Abdomen is soft. There is no mass. Tenderness: There is no abdominal tenderness. Skin:     Findings: No rash. Neurological:      Mental Status: She is alert and oriented to person, place, and time. Cranial Nerves: Cranial nerves 2-12 are intact. Sensory: Sensation is intact. Motor: Motor function is intact. Coordination: Finger-Nose-Finger Test normal.      Gait: Tandem walk abnormal.   Psychiatric:         Mood and Affect: Mood normal.         Behavior: Behavior normal.         Thought Content:  Thought content normal.         Judgment: Judgment normal.       Angel Kumar DO

## 2023-11-09 ENCOUNTER — HOSPITAL ENCOUNTER (OUTPATIENT)
Dept: MRI IMAGING | Facility: HOSPITAL | Age: 68
End: 2023-11-09
Payer: MEDICARE

## 2023-11-09 DIAGNOSIS — R42 VERTIGO: ICD-10-CM

## 2023-11-09 PROCEDURE — 70553 MRI BRAIN STEM W/O & W/DYE: CPT

## 2023-11-09 PROCEDURE — A9585 GADOBUTROL INJECTION: HCPCS | Performed by: FAMILY MEDICINE

## 2023-11-09 PROCEDURE — G1004 CDSM NDSC: HCPCS

## 2023-11-09 RX ORDER — GADOBUTROL 604.72 MG/ML
11 INJECTION INTRAVENOUS
Status: COMPLETED | OUTPATIENT
Start: 2023-11-09 | End: 2023-11-09

## 2023-11-09 RX ADMIN — GADOBUTROL 11 ML: 604.72 INJECTION INTRAVENOUS at 09:11

## 2023-11-16 NOTE — RESULT ENCOUNTER NOTE
Reviewing MRI findings which show some trace fluid in the left mastoid and some mild right maxillary sinus thickening all suggestive of chronic allergies sinus etc.  I am not sure if the findings in the mastoid could affect your symptoms of dizziness. At this point I would like an ENT opinion if that is okay with you. I do not see that you have seen ENT anywhere recently in your chart, but if you do have a preference let me know.

## 2023-11-17 DIAGNOSIS — H74.92 DISORDER OF LEFT MASTOID: ICD-10-CM

## 2023-11-17 DIAGNOSIS — R42 VERTIGO: Primary | ICD-10-CM

## 2023-11-23 DIAGNOSIS — F41.9 ANXIETY: ICD-10-CM

## 2023-11-24 RX ORDER — BUPROPION HYDROCHLORIDE 150 MG/1
150 TABLET ORAL EVERY MORNING
Qty: 90 TABLET | Refills: 1 | Status: SHIPPED | OUTPATIENT
Start: 2023-11-24

## 2023-12-20 ENCOUNTER — EVALUATION (OUTPATIENT)
Dept: PHYSICAL THERAPY | Facility: CLINIC | Age: 68
End: 2023-12-20
Payer: MEDICARE

## 2023-12-20 DIAGNOSIS — H81.12 BPPV (BENIGN PAROXYSMAL POSITIONAL VERTIGO), LEFT: ICD-10-CM

## 2023-12-20 DIAGNOSIS — R42 DIZZINESS: Primary | ICD-10-CM

## 2023-12-20 PROCEDURE — 97162 PT EVAL MOD COMPLEX 30 MIN: CPT | Performed by: PHYSICAL THERAPIST

## 2023-12-20 PROCEDURE — 97112 NEUROMUSCULAR REEDUCATION: CPT | Performed by: PHYSICAL THERAPIST

## 2023-12-20 PROCEDURE — 95992 CANALITH REPOSITIONING PROC: CPT | Performed by: PHYSICAL THERAPIST

## 2023-12-20 NOTE — PROGRESS NOTES
PT Evaluation     Today's date: 2023  Patient name: Niki Marroquin  : 1955  MRN: [id number]  Referring provider: Jorge Escobar MD  Dx:   Encounter Diagnosis     ICD-10-CM    1. Dizziness  R42       2. BPPV (benign paroxysmal positional vertigo), left  H81.12 Ambulatory Referral to Physical Therapy                     Assessment  Assessment details: 68 year old female patient reports to PT with dizziness. No red flags noted and patient denies numbness/tingling. Patient dizziness is correlated with BPPV as patient had positive Domonique-Hallpike to the left. Epley maneuver completed twice. Patient will benefit from skilled PT services to address current impairments and functional limitations to help patient return to her PLOF.       Impairments: activity intolerance, impaired balance and lacks appropriate home exercise program    Symptom irritability: low  Goals  STG  1. Patient will be independent with completion of HEP throughout therapy  2. Patient will turn her head to the right without increase in dizziness in 1 week.  LTG  1. Patient will look down without increase in symptoms in 1.5 weeks.  2. Patient will roll over in bed and sit up without increase in symptoms in 1.5 weeks.     Plan  Planned therapy interventions: activity modification, behavior modification, manual therapy, motor coordination training, neuromuscular re-education, patient education and home exercise program  Frequency: 1x week  Duration in weeks: 2  Treatment plan discussed with: patient      Subjective Evaluation    History of Present Illness  Mechanism of injury: Patient reports with dizziness since July. Patient notes it is getting more frequent. It used to be when she was getting in and out of bed. Patient notes now is also when she is lying on her side on the sofa and she goes to sit up she feels dizzy. Patient also has dizziness rolling over in bed. Patient also has dizziness looking up and down. Patient denies  "numbness/tingling.    Patient also has some clicking in her TMJ. Patient denies any pain.       Objective     Concurrent Complaints  Positive for tinnitus (occassionaly), visual change (\"beginning of cataracts\") and aural fullness (sometimes on the left). Negative for headaches, nausea/motion sickness, hearing loss, memory loss, poor concentration and peripheral neuropathy  Neuro Exam:     Dizziness  Positive for oscillopsia and light-headedness.   Negative for disequilibrium, vertigo, motion sickness, rocking or swaying, diplopia and floating or swimming.     Exacerbating factors  Positive for bending over, rolling in bed, looking up, supine to/from sitting and walking in busy environment.   Negative for walking, turning head and optokinetic movement.     Symptoms   Duration: seconds  Frequency: depends on what she does during the day  Intensity at best: 0/10  Intensity at worst: 6/10    Headaches   Patient reports headaches: No.     Cervical exam   Ligament Laxity Testing   Alar ligament: WNL  Sharp Leeanna: WNL  Modified VBI   Left: asymptomatic  Right: asymptomatic    Oculomotor exam   Resting nystagmus: not present   Gaze holding nystagmus: not present left  and not present right  Smooth pursuits: within normal limits  Vertical saccades: normal  Horizontal saccades: normal  Convergence: normal  Head thrust: left normal and right normal    Positional testing   Domonique-Hallpike   Left posterior canal: symptomatic, torsional and upbeating  Right posterior canal: WNL             Precautions: none      Manuals 12/20            Epley 2x L                                                   Neuro Re-Ed                                                                                           Education  r            Ther Ex                                                                                                                     Ther Activity                                       Gait Training                        "                Modalities

## 2023-12-22 ENCOUNTER — OFFICE VISIT (OUTPATIENT)
Dept: PHYSICAL THERAPY | Facility: CLINIC | Age: 68
End: 2023-12-22
Payer: MEDICARE

## 2023-12-22 DIAGNOSIS — H81.12 BPPV (BENIGN PAROXYSMAL POSITIONAL VERTIGO), LEFT: Primary | ICD-10-CM

## 2023-12-22 PROCEDURE — 97140 MANUAL THERAPY 1/> REGIONS: CPT | Performed by: PHYSICAL THERAPIST

## 2023-12-22 PROCEDURE — 97112 NEUROMUSCULAR REEDUCATION: CPT | Performed by: PHYSICAL THERAPIST

## 2023-12-22 NOTE — PROGRESS NOTES
Daily Note     Today's date: 2023  Patient name: Niki Marroquin  : 1955  MRN: [id number]  Referring provider: Jorge Escobar MD  Dx:   Encounter Diagnosis     ICD-10-CM    1. BPPV (benign paroxysmal positional vertigo), left  H81.12                      Subjective: Patient reports about the same.      Objective: See treatment diary below      Assessment: Tolerated treatment well. Patient would benefit from continued PT. Epley and melony hogan completed this visit. Modified edison more effective.      Plan: Progress treatment as tolerated.       Precautions: none      Manuals            Epley 2x L 1x epley and 2x modified edison                                                  Neuro Re-Ed                                                                                           Education  r            Ther Ex                                                                                                                     Ther Activity                                       Gait Training                                       Modalities

## 2023-12-28 ENCOUNTER — OFFICE VISIT (OUTPATIENT)
Dept: PHYSICAL THERAPY | Facility: CLINIC | Age: 68
End: 2023-12-28
Payer: MEDICARE

## 2023-12-28 DIAGNOSIS — R42 DIZZINESS: ICD-10-CM

## 2023-12-28 DIAGNOSIS — H81.12 BPPV (BENIGN PAROXYSMAL POSITIONAL VERTIGO), LEFT: Primary | ICD-10-CM

## 2023-12-28 PROCEDURE — 97140 MANUAL THERAPY 1/> REGIONS: CPT | Performed by: PHYSICAL THERAPIST

## 2023-12-28 PROCEDURE — 97112 NEUROMUSCULAR REEDUCATION: CPT | Performed by: PHYSICAL THERAPIST

## 2023-12-28 NOTE — PROGRESS NOTES
Daily Note     Today's date: 2023  Patient name: Niki Marroquin  : 1955  MRN: [id number]  Referring provider: Jorge Escobar MD  Dx:   Encounter Diagnosis     ICD-10-CM    1. BPPV (benign paroxysmal positional vertigo), left  H81.12       2. Dizziness  R42                      Subjective: Patient reports has been feeling good.      Objective: See treatment diary below      Assessment: Tolerated treatment well. Patient would benefit from continued PT. Domonique-Hallpike checked and was negative.     Plan: Progress treatment as tolerated.       Precautions: none      Manuals           Epley 2x L 1x epley and 2x modified semont Domonique-Hallpike assessed                                                  Neuro Re-Ed                                                                                           Education  r  r          Ther Ex                                                                                                                     Ther Activity                                       Gait Training                                       Modalities

## 2024-02-05 DIAGNOSIS — I10 ESSENTIAL HYPERTENSION: ICD-10-CM

## 2024-02-06 RX ORDER — AMLODIPINE BESYLATE 2.5 MG/1
TABLET ORAL
Qty: 90 TABLET | Refills: 1 | Status: SHIPPED | OUTPATIENT
Start: 2024-02-06

## 2024-05-19 DIAGNOSIS — F41.9 ANXIETY: ICD-10-CM

## 2024-05-19 RX ORDER — BUPROPION HYDROCHLORIDE 150 MG/1
150 TABLET ORAL EVERY MORNING
Qty: 90 TABLET | Refills: 1 | Status: SHIPPED | OUTPATIENT
Start: 2024-05-19

## 2024-08-14 DIAGNOSIS — I10 ESSENTIAL HYPERTENSION: ICD-10-CM

## 2024-08-14 RX ORDER — AMLODIPINE BESYLATE 2.5 MG/1
TABLET ORAL
Qty: 30 TABLET | Refills: 0 | Status: SHIPPED | OUTPATIENT
Start: 2024-08-14

## 2024-08-19 ENCOUNTER — RA CDI HCC (OUTPATIENT)
Dept: OTHER | Facility: HOSPITAL | Age: 69
End: 2024-08-19

## 2024-08-23 ENCOUNTER — OFFICE VISIT (OUTPATIENT)
Dept: FAMILY MEDICINE CLINIC | Facility: CLINIC | Age: 69
End: 2024-08-23
Payer: MEDICARE

## 2024-08-23 DIAGNOSIS — E53.8 VITAMIN B12 DEFICIENCY: ICD-10-CM

## 2024-08-23 DIAGNOSIS — R10.13 EPIGASTRIC ABDOMINAL PAIN: ICD-10-CM

## 2024-08-23 DIAGNOSIS — E55.9 VITAMIN D DEFICIENCY: ICD-10-CM

## 2024-08-23 DIAGNOSIS — R13.19 ESOPHAGEAL DYSPHAGIA: ICD-10-CM

## 2024-08-23 DIAGNOSIS — Z12.31 ENCOUNTER FOR SCREENING MAMMOGRAM FOR BREAST CANCER: ICD-10-CM

## 2024-08-23 DIAGNOSIS — I10 ESSENTIAL HYPERTENSION: Chronic | ICD-10-CM

## 2024-08-23 DIAGNOSIS — E04.1 NONTOXIC SINGLE THYROID NODULE: ICD-10-CM

## 2024-08-23 DIAGNOSIS — Z00.00 MEDICARE ANNUAL WELLNESS VISIT, SUBSEQUENT: Primary | ICD-10-CM

## 2024-08-23 DIAGNOSIS — K21.9 GASTROESOPHAGEAL REFLUX DISEASE WITHOUT ESOPHAGITIS: ICD-10-CM

## 2024-08-23 PROCEDURE — G0439 PPPS, SUBSEQ VISIT: HCPCS | Performed by: FAMILY MEDICINE

## 2024-08-23 NOTE — PROGRESS NOTES
Ambulatory Visit  Name: Niki Marroquin      : 1955      MRN: [id number]  Encounter Provider: Dayami Mckinney DO  Encounter Date: 2024   Encounter department: [facility] PRIMARY CARE    Assessment & Plan   1. Medicare annual wellness visit, subsequent  2. Encounter for screening mammogram for breast cancer  -     Mammo screening bilateral w 3d & cad; Future; Expected date: 2024  3. Essential hypertension  -     Comprehensive metabolic panel; Future  -     Lipid panel; Future  -     Comprehensive metabolic panel  -     Lipid panel  4. Nontoxic single thyroid nodule  -     TSH, 3rd generation; Future  -     TSH, 3rd generation  -     US thyroid; Future; Expected date: 2024  5. Vitamin D deficiency  -     Vitamin D 25 hydroxy; Future  -     Vitamin D 25 hydroxy  6. Vitamin B12 deficiency  -     Vitamin B12; Future  -     Vitamin B12  7. Esophageal dysphagia  -     Ambulatory Referral to Gastroenterology; Future  8. Gastroesophageal reflux disease without esophagitis  -     Ambulatory Referral to Gastroenterology; Future  9. Epigastric abdominal pain  -     Ambulatory Referral to Gastroenterology; Future  Will need EGD for symptoms of dysphagia etc.  Yearly lab work ordered.  Could consider GLP-1, but likely will not be covered by Medicare.  Could consider referral for weight management program    Depression Screening and Follow-up Plan: Patient was screened for depression during today's encounter. They screened negative with a PHQ-2 score of 2.      Preventive health issues were discussed with patient, and age appropriate screening tests were ordered as noted in patient's After Visit Summary. Personalized health advice and appropriate referrals for health education or preventive services given if needed, as noted in patient's After Visit Summary.    History of Present Illness     69-year-old female here for Medicare wellness and follow-up.  GI symptoms discussed       Chief  Complaint   Patient presents with   • Medicare Wellness Visit      Patient Care Team:  Dayami Mckinney DO as PCP - General  Dayami Mckinney DO as PCP - PCP-[facility] ([facility])    Review of Systems   Constitutional:  Positive for fatigue and unexpected weight change (Continued weight gain).        Still working   HENT:  Positive for trouble swallowing.    Respiratory:          CPAP   Gastrointestinal:         Reflux, dysphagia   Musculoskeletal:  Positive for back pain (low).     Upcoming sleep medicine appointment.  Annual Wellness Visit Questionnaire   Niki is here for her Subsequent Wellness visit.     Health Risk Assessment:   Patient rates overall health as good. Patient feels that their physical health rating is same. Patient is satisfied with their life. Eyesight was rated as slightly worse. Hearing was rated as same. Patient feels that their emotional and mental health rating is same. Patients states they are never, rarely angry. Patient states they are sometimes unusually tired/fatigued. Pain experienced in the last 7 days has been some. Patient's pain rating has been 4/10. Patient states that she has experienced weight loss or gain in last 6 months.     Depression Screening:   PHQ-2 Score: 2      Fall Risk Screening:   In the past year, patient has experienced: no history of falling in past year      Urinary Incontinence Screening:   Patient has not leaked urine accidently in the last six months.     Home Safety:  Patient does not have trouble with stairs inside or outside of their home. Patient has working smoke alarms and has working carbon monoxide detector. Home safety hazards include: none.     Nutrition:   Current diet is Limited junk food.     Medications:   Patient is currently taking over-the-counter supplements. OTC medications include: see medication list. Patient is able to manage medications.     Activities of Daily Living (ADLs)/Instrumental Activities of Daily Living (IADLs):   Walk  and transfer into and out of bed and chair?: Yes  Dress and groom yourself?: Yes    Bathe or shower yourself?: Yes    Feed yourself? Yes  Do your laundry/housekeeping?: Yes  Manage your money, pay your bills and track your expenses?: Yes  Make your own meals?: Yes    Do your own shopping?: Yes    Durable Medical Equipment Suppliers  [employer]    Previous Hospitalizations:   Any hospitalizations or ED visits within the last 12 months?: No      Advance Care Planning:   Living will: Yes    Durable POA for healthcare: Yes    Advanced directive: Yes    End of Life Decisions reviewed with patient: Yes    Provider agrees with end of life decisions: Yes      Cognitive Screening:   Provider or family/friend/caregiver concerned regarding cognition?: No    PREVENTIVE SCREENINGS      Cardiovascular Screening:    General: Screening Current      Diabetes Screening:     General: Screening Current      Colorectal Cancer Screening:     General: Screening Current      Breast Cancer Screening:     General: Screening Current      Cervical Cancer Screening:    General: Screening Not Indicated      Osteoporosis Screening:    General: Risks and Benefits Discussed      Lung Cancer Screening:     General: Screening Not Indicated      Hepatitis C Screening:    General: Screening Current    Screening, Brief Intervention, and Referral to Treatment (SBIRT)    Screening  Typical number of drinks in a day: 0  Typical number of drinks in a week: 2  Interpretation: Low risk drinking behavior.    AUDIT-C Screenin) How often did you have a drink containing alcohol in the past year? 2 to 4 times a month  2) How many drinks did you have on a typical day when you were drinking in the past year? 1 to 2  3) How often did you have 6 or more drinks on one occasion in the past year? never    AUDIT-C Score: 2  Interpretation: Score 0-2 (female): Negative screen for alcohol misuse    Single Item Drug Screening:  How often have you used an illegal drug  "(including marijuana) or a prescription medication for non-medical reasons in the past year? never    Single Item Drug Screen Score: 0  Interpretation: Negative screen for possible drug use disorder    Social Determinants of Health     Financial Resource Strain: Low Risk  (8/14/2023)    Overall Financial Resource Strain (CARDIA)    • Difficulty of Paying Living Expenses: Not very hard   Food Insecurity: No Food Insecurity (8/20/2024)    Hunger Vital Sign    • Worried About Running Out of Food in the Last Year: Never true    • Ran Out of Food in the Last Year: Never true   Transportation Needs: No Transportation Needs (8/20/2024)    PRAPARE - Transportation    • Lack of Transportation (Medical): No    • Lack of Transportation (Non-Medical): No   Housing Stability: Low Risk  (8/20/2024)    Housing Stability Vital Sign    • Unable to Pay for Housing in the Last Year: No    • Number of Times Moved in the Last Year: 0    • Homeless in the Last Year: No   Utilities: Not At Risk (8/20/2024)    [facility] Utilities    • Threatened with loss of utilities: No     No results found.    Objective     /78   Pulse 79   Temp (!) 97.1 °F (36.2 °C) (Temporal)   Resp 16   Ht 5' 2\" (1.575 m)   Wt 112 kg (247 lb 9.6 oz)   SpO2 97%   BMI 45.29 kg/m²     Physical Exam  Vitals and nursing note reviewed.   Constitutional:       General: She is not in acute distress.     Appearance: Normal appearance. She is well-developed. She is obese.   HENT:      Head: Normocephalic and atraumatic.      Right Ear: Tympanic membrane normal.      Left Ear: Tympanic membrane normal.      Nose: Nose normal.      Mouth/Throat:      Mouth: Mucous membranes are moist.   Eyes:      Conjunctiva/sclera: Conjunctivae normal.   Neck:      Vascular: No carotid bruit.   Cardiovascular:      Rate and Rhythm: Normal rate and regular rhythm.      Heart sounds: No murmur heard.  Pulmonary:      Effort: Pulmonary effort is normal. No respiratory distress.      Breath " sounds: Normal breath sounds.   Abdominal:      General: Bowel sounds are normal.      Palpations: Abdomen is soft.      Tenderness: There is no abdominal tenderness.   Musculoskeletal:         General: No swelling.      Comments: Antalgic gait   Skin:     General: Skin is warm and dry.      Capillary Refill: Capillary refill takes less than 2 seconds.   Neurological:      Mental Status: She is alert and oriented to person, place, and time.   Psychiatric:         Mood and Affect: Mood normal. Affect is flat.         Speech: Speech normal.         Behavior: Behavior normal.         Cognition and Memory: Cognition normal.         Judgment: Judgment normal.

## 2024-08-23 NOTE — PATIENT INSTRUCTIONS
Medicare Preventive Visit Patient Instructions  Thank you for completing your Welcome to Medicare Visit or Medicare Annual Wellness Visit today. Your next wellness visit will be due in one year (8/24/2025).  The screening/preventive services that you may require over the next 5-10 years are detailed below. Some tests may not apply to you based off risk factors and/or age. Screening tests ordered at today's visit but not completed yet may show as past due. Also, please note that scanned in results may not display below.  Preventive Screenings:  Service Recommendations Previous Testing/Comments   Colorectal Cancer Screening  * Colonoscopy    * Fecal Occult Blood Test (FOBT)/Fecal Immunochemical Test (FIT)  * Fecal DNA/Cologuard Test  * Flexible Sigmoidoscopy Age: 45-75 years old   Colonoscopy: every 10 years (may be performed more frequently if at higher risk)  OR  FOBT/FIT: every 1 year  OR  Cologuard: every 3 years  OR  Sigmoidoscopy: every 5 years  Screening may be recommended earlier than age 45 if at higher risk for colorectal cancer. Also, an individualized decision between you and your healthcare provider will decide whether screening between the ages of 76-85 would be appropriate. Colonoscopy: 02/13/2023  FOBT/FIT: Not on file  Cologuard: Not on file  Sigmoidoscopy: Not on file    Screening Current     Breast Cancer Screening Age: 40+ years old  Frequency: every 1-2 years  Not required if history of left and right mastectomy Mammogram: 08/18/2023    Screening Current   Cervical Cancer Screening Between the ages of 21-29, pap smear recommended once every 3 years.   Between the ages of 30-65, can perform pap smear with HPV co-testing every 5 years.   Recommendations may differ for women with a history of total hysterectomy, cervical cancer, or abnormal pap smears in past. Pap Smear: Not on file    Screening Not Indicated   Hepatitis C Screening Once for adults born between 1945 and 1965  More frequently in  patients at high risk for Hepatitis C Hep C Antibody: 12/21/2017    Screening Current   Diabetes Screening 1-2 times per year if you're at risk for diabetes or have pre-diabetes Fasting glucose: No results in last 5 years (No results in last 5 years)  A1C: 4.8 % of total Hgb (8/23/2021)      Cholesterol Screening Once every 5 years if you don't have a lipid disorder. May order more often based on risk factors. Lipid panel: 07/29/2023    Screening Current     Other Preventive Screenings Covered by Medicare:  Abdominal Aortic Aneurysm (AAA) Screening: covered once if your at risk. You're considered to be at risk if you have a family history of AAA.  Lung Cancer Screening: covers low dose CT scan once per year if you meet all of the following conditions: (1) Age 55-77; (2) No signs or symptoms of lung cancer; (3) Current smoker or have quit smoking within the last 15 years; (4) You have a tobacco smoking history of at least 20 pack years (packs per day multiplied by number of years you smoked); (5) You get a written order from a healthcare provider.  Glaucoma Screening: covered annually if you're considered high risk: (1) You have diabetes OR (2) Family history of glaucoma OR (3)  aged 50 and older OR (4)  American aged 65 and older  Osteoporosis Screening: covered every 2 years if you meet one of the following conditions: (1) You're estrogen deficient and at risk for osteoporosis based off medical history and other findings; (2) Have a vertebral abnormality; (3) On glucocorticoid therapy for more than 3 months; (4) Have primary hyperparathyroidism; (5) On osteoporosis medications and need to assess response to drug therapy.   Last bone density test (DXA Scan): 12/20/2016.  HIV Screening: covered annually if you're between the age of 15-65. Also covered annually if you are younger than 15 and older than 65 with risk factors for HIV infection. For pregnant patients, it is covered up to 3 times  per pregnancy.    Immunizations:  Immunization Recommendations   Influenza Vaccine Annual influenza vaccination during flu season is recommended for all persons aged >= 6 months who do not have contraindications   Pneumococcal Vaccine   * Pneumococcal conjugate vaccine = PCV13 (Prevnar 13), PCV15 (Vaxneuvance), PCV20 (Prevnar 20)  * Pneumococcal polysaccharide vaccine = PPSV23 (Pneumovax) Adults 19-65 yo with certain risk factors or if 65+ yo  If never received any pneumonia vaccine: recommend Prevnar 20 (PCV20)  Give PCV20 if previously received 1 dose of PCV13 or PPSV23   Hepatitis B Vaccine 3 dose series if at intermediate or high risk (ex: diabetes, end stage renal disease, liver disease)   Respiratory syncytial virus (RSV) Vaccine - COVERED BY MEDICARE PART D  * RSVPreF3 (Arexvy) CDC recommends that adults 60 years of age and older may receive a single dose of RSV vaccine using shared clinical decision-making (SCDM)   Tetanus (Td) Vaccine - COST NOT COVERED BY MEDICARE PART B Following completion of primary series, a booster dose should be given every 10 years to maintain immunity against tetanus. Td may also be given as tetanus wound prophylaxis.   Tdap Vaccine - COST NOT COVERED BY MEDICARE PART B Recommended at least once for all adults. For pregnant patients, recommended with each pregnancy.   Shingles Vaccine (Shingrix) - COST NOT COVERED BY MEDICARE PART B  2 shot series recommended in those 19 years and older who have or will have weakened immune systems or those 50 years and older     Health Maintenance Due:      Topic Date Due   • Breast Cancer Screening: Mammogram  08/18/2024   • Colorectal Cancer Screening  02/13/2026   • Hepatitis C Screening  Completed     Immunizations Due:      Topic Date Due   • Pneumococcal Vaccine: 65+ Years (2 of 2 - PCV) 07/29/2022   • COVID-19 Vaccine (4 - 2023-24 season) 09/01/2023   • Influenza Vaccine (1) 09/01/2024     Advance Directives   What are advance directives?   Advance directives are legal documents that state your wishes and plans for medical care. These plans are made ahead of time in case you lose your ability to make decisions for yourself. Advance directives can apply to any medical decision, such as the treatments you want, and if you want to donate organs.   What are the types of advance directives?  There are many types of advance directives, and each state has rules about how to use them. You may choose a combination of any of the following:  Living will:  This is a written record of the treatment you want. You can also choose which treatments you do not want, which to limit, and which to stop at a certain time. This includes surgery, medicine, IV fluid, and tube feedings.   Durable power of  for healthcare (DPAHC):  This is a written record that states who you want to make healthcare choices for you when you are unable to make them for yourself. This person, called a proxy, is usually a family member or a friend. You may choose more than 1 proxy.  Do not resuscitate (DNR) order:  A DNR order is used in case your heart stops beating or you stop breathing. It is a request not to have certain forms of treatment, such as CPR. A DNR order may be included in other types of advance directives.  Medical directive:  This covers the care that you want if you are in a coma, near death, or unable to make decisions for yourself. You can list the treatments you want for each condition. Treatment may include pain medicine, surgery, blood transfusions, dialysis, IV or tube feedings, and a ventilator (breathing machine).  Values history:  This document has questions about your views, beliefs, and how you feel and think about life. This information can help others choose the care that you would choose.  Why are advance directives important?  An advance directive helps you control your care. Although spoken wishes may be used, it is better to have your wishes written down.  Spoken wishes can be misunderstood, or not followed. Treatments may be given even if you do not want them. An advance directive may make it easier for your family to make difficult choices about your care.   Weight Management   Why it is important to manage your weight:  Being overweight increases your risk of health conditions such as heart disease, high blood pressure, type 2 diabetes, and certain types of cancer. It can also increase your risk for osteoarthritis, sleep apnea, and other respiratory problems. Aim for a slow, steady weight loss. Even a small amount of weight loss can lower your risk of health problems.  How to lose weight safely:  A safe and healthy way to lose weight is to eat fewer calories and get regular exercise. You can lose up about 1 pound a week by decreasing the number of calories you eat by 500 calories each day.   Healthy meal plan for weight management:  A healthy meal plan includes a variety of foods, contains fewer calories, and helps you stay healthy. A healthy meal plan includes the following:  Eat whole-grain foods more often.  A healthy meal plan should contain fiber. Fiber is the part of grains, fruits, and vegetables that is not broken down by your body. Whole-grain foods are healthy and provide extra fiber in your diet. Some examples of whole-grain foods are whole-wheat breads and pastas, oatmeal, brown rice, and bulgur.  Eat a variety of vegetables every day.  Include dark, leafy greens such as spinach, kale, candice greens, and mustard greens. Eat yellow and orange vegetables such as carrots, sweet potatoes, and winter squash.   Eat a variety of fruits every day.  Choose fresh or canned fruit (canned in its own juice or light syrup) instead of juice. Fruit juice has very little or no fiber.  Eat low-fat dairy foods.  Drink fat-free (skim) milk or 1% milk. Eat fat-free yogurt and low-fat cottage cheese. Try low-fat cheeses such as mozzarella and other reduced-fat  cheeses.  Choose meat and other protein foods that are low in fat.  Choose beans or other legumes such as split peas or lentils. Choose fish, skinless poultry (chicken or turkey), or lean cuts of red meat (beef or pork). Before you cook meat or poultry, cut off any visible fat.   Use less fat and oil.  Try baking foods instead of frying them. Add less fat, such as margarine, sour cream, regular salad dressing and mayonnaise to foods. Eat fewer high-fat foods. Some examples of high-fat foods include french fries, doughnuts, ice cream, and cakes.  Eat fewer sweets.  Limit foods and drinks that are high in sugar. This includes candy, cookies, regular soda, and sweetened drinks.  Exercise:  Exercise at least 30 minutes per day on most days of the week. Some examples of exercise include walking, biking, dancing, and swimming. You can also fit in more physical activity by taking the stairs instead of the elevator or parking farther away from stores. Ask your healthcare provider about the best exercise plan for you.      © Copyright [employer] 2018 Information is for End User's use only and may not be sold, redistributed or otherwise used for commercial purposes. All illustrations and images included in CareNotes® are the copyrighted property of [employer]M., Inc[employer] or [employer]

## 2024-08-28 PROBLEM — Z86.0101 HX OF ADENOMATOUS POLYP OF COLON: Status: ACTIVE | Noted: 2023-02-13

## 2024-08-28 PROBLEM — Z86.010 HX OF ADENOMATOUS POLYP OF COLON: Status: ACTIVE | Noted: 2023-02-13

## 2024-08-28 NOTE — H&P (VIEW-ONLY)
[facility] - Outpatient Note  Niki Marroquin 69 y.o. female MRN: [id number]  Unit/Bed#:  Encounter: [id number]          ASSESSMENT AND PLAN:        An upper endoscopy will be done..  Because of her elevated BMI need to be done in hospital setting.  Risk and benefits and possible dilatation were discussed.  In the meantime I like her to take Nexium 20 mg daily to see if it improves her symptoms and she makes also try an over-the-counter antacid intermittently.        Diagnoses and all orders for this visit:    Hx of adenomatous polyp of colon    Esophageal dysphagia  -     Ambulatory Referral to Gastroenterology  -     EGD; Future    Gastroesophageal reflux disease without esophagitis  -     Ambulatory Referral to Gastroenterology  -     EGD; Future    Epigastric abdominal pain  -     Ambulatory Referral to Gastroenterology  -     EGD; Future    Anxiety    BMI 40.0-44.9, adult (HCC)    Other orders  -     [employer] TRIPLE MAGNESIUM COMPLEX PO; Take 1,000 mg combined by mouth Daily at 2am  -     Ashwagandha 500 MG CAPS; Take 1,000 mg by mouth daily  -     Turmeric (QC Tumeric Complex) 500 MG CAPS; Take 1,000 mg by mouth daily  -     Ascorbic Acid (vitamin C) 1000 MG tablet; Take 1,000 mg by mouth daily  -     Cholecalciferol (Vitamin D) 125 MCG (5000 UT) CAPS; Take 1 capsule by mouth daily  -     Diet NPO; Sips with meds; Standing  -     Void on call to OR; Standing  -     Insert peripheral IV; Standing          ______________________________________________________________________    HPI:  the patient comes to the office.  Apparently for some time she's had epigastric pain which radiates up into her chest.  This worse when she is sitting.  It is not exertional.  It does have somewhat of a burning quality.  She also belches quite a bit after eating.  Several years ago she required a Heimlich maneuver from eating some chicken that seem to be oropharyngeal.  She does state she gets a feeling of slight  tightness in her throat intermittently as well.  Again the symptoms are not exertional.      REVIEW OF SYSTEMS:    CONSTITUTIONAL: Denies any fever, chills, rigors, and weight loss.  HEENT: No earache or tinnitus. Denies hearing loss or visual disturbances.  CARDIOVASCULAR: No chest pain or palpitations.   RESPIRATORY: Denies any cough, hemoptysis, shortness of breath or dyspnea on exertion.  GASTROINTESTINAL: As noted in the History of Present Illness.   GENITOURINARY: No problems with urination. Denies any hematuria or dysuria.  NEUROLOGIC: No dizziness or vertigo, denies headaches.   MUSCULOSKELETAL: Denies any muscle or joint pain.   SKIN: Denies skin rashes or itching.   ENDOCRINE: Denies excessive thirst. Denies intolerance to heat or cold.  PSYCHOSOCIAL: Denies depression or anxiety. Denies any recent memory loss.       Historical Information   Past Medical History:   Diagnosis Date    ACE-inhibitor cough     Last assessed 12/04/15    Anxiety     Arthritis     Chronic pain disorder     Depression     Disease of thyroid gland     benign nodule    History of transfusion     no adverse reaction    Hypertension     Wears glasses      Past Surgical History:   Procedure Laterality Date     SECTION      CHOLECYSTECTOMY      COLONOSCOPY  2017    Dr. Ricardo.  Hyperplastic sigmoid polyp.    COLONOSCOPY      Dr. Ricardo. Hyperplastic rectal polyp.    COLONOSCOPY      Dr. Larson.  Tubular adenoma polyps in the ascending and descending colon.    COLONOSCOPY      Dr. Larson.  Normal exam.    EGD      Dr. Ricardo.  Normal exam, negative H. pylori.    JOINT REPLACEMENT Left     hip    OK ARTHRP ACETBLR/PROX FEM PROSTC AGRFT/ALGRFT Right 07/10/2018    Procedure: ARTHROPLASTY HIP TOTAL;  Surgeon: Emir Duarte MD;  Location: [facility];  Service: Orthopedics    TONSILLECTOMY AND ADENOIDECTOMY      TUBAL LIGATION      VAGINAL HYSTERECTOMY Bilateral 2007    nd ooperectomy     Social History  "  Social History     Substance and Sexual Activity   Alcohol Use Yes    Alcohol/week: 1.0 - 2.0 standard drink of alcohol    Types: 1 - 2 Standard drinks or equivalent per week     Social History     Substance and Sexual Activity   Drug Use No     Social History     Tobacco Use   Smoking Status Former    Current packs/day: 0.00    Types: Cigarettes    Start date:     Quit date:     Years since quittin.6   Smokeless Tobacco Never     Family History   Problem Relation Age of Onset    Aortic stenosis Mother     Heart murmur Mother     Hypertension Mother     Heart disease Mother     Colon polyps Mother     Hypertension Father     Cancer Father         head and neck spread to spine and brain    Skin cancer Father     Hypertension Sister     Hypertension Brother     Skin cancer Brother     Hyperlipidemia Brother     Skin cancer Brother     Prostate cancer Brother     Hypertension Family     Coronary artery disease Family         Stenosis    Thyroid disease Neg Hx     Diabetes Neg Hx        Meds/Allergies       Current Outpatient Medications:     amLODIPine (NORVASC) 2.5 mg tablet    Ascorbic Acid (vitamin C) 1000 MG tablet    Ashwagandha 500 MG CAPS    buPROPion (WELLBUTRIN XL) 150 mg 24 hr tablet    Cholecalciferol (Vitamin D) 125 MCG (5000 UT) CAPS    [employer] TRIPLE MAGNESIUM COMPLEX PO    losartan (COZAAR) 50 mg tablet    Turmeric (QC Tumeric Complex) 500 MG CAPS    venlafaxine (EFFEXOR-XR) 150 mg 24 hr capsule    Allergies   Allergen Reactions    Lisinopril Cough    Ace Inhibitors Cough     Category: Adverse Reaction;     Other Rash     CERTAIN TAPE           Objective     Blood pressure 168/88, pulse 80, temperature 97.8 °F (36.6 °C), height 5' 2\" (1.575 m), weight 113 kg (248 lb 6.4 oz), SpO2 98%.Body mass index is 45.43 kg/m².         PHYSICAL EXAM:      General Appearance:   Alert, cooperative, no distress   HEENT:   Normocephalic, atraumatic, anicteric.     Neck:  Supple, symmetrical, trachea midline "   Lungs:   Clear to auscultation bilaterally; no rales, rhonchi or wheezing; respirations unlabored    Heart::   Regular rate and rhythm; no murmur, rub, or gallop.   Abdomen:   Soft, non-tender, non-distended; normal bowel sounds; no masses, no organomegaly    Genitalia:   Deferred    Rectal:   Deferred    Extremities:  No cyanosis, clubbing or edema    Pulses:  2+ and symmetric all extremities    Skin:  No jaundice, rashes, or lesions    Lymph nodes:  No palpable cervical lymphadenopathy        Lab Results:   No visits with results within 1 Day(s) from this visit.   Latest known visit with results is:   Telephone on 09/11/2023   Component Date Value    Supplier Name 09/11/2023 [employer]     Supplier Phone Number 09/11/2023 [phone]     Order Status 09/11/2023 Delivery Successful     Delivery Request Date 09/11/2023 09/11/2023     Date Delivered  09/11/2023 09/12/2023     Item Description 09/11/2023 PAP Accessory     Item Description 09/11/2023 PAP Mask, Full Face, Fit Upon Setup, N/A, 1 per 3 months     Item Description 09/11/2023 Humidifier Water Chamber, 1 per 6 months     Item Description 09/11/2023 PAP Headgear, 1 per 6 months     Item Description 09/11/2023 PAP Chinstrap, 1 per 6 months     Item Description 09/11/2023 PAP Humidifier, Heated     Item Description 09/11/2023 Heated PAP Tubing, 1 per 3 months     Item Description 09/11/2023 Disposable PAP Filter, 2 per 1 month     Item Description 09/11/2023 Non-Disposable PAP Filter, 1 per 6 months     Item Description 09/11/2023 PAP Mask Interface Cushion, Full Face, 1 per 1 month

## 2024-09-06 VITALS
TEMPERATURE: 97.1 F | HEIGHT: 62 IN | DIASTOLIC BLOOD PRESSURE: 78 MMHG | HEART RATE: 79 BPM | WEIGHT: 247.6 LBS | SYSTOLIC BLOOD PRESSURE: 128 MMHG | RESPIRATION RATE: 16 BRPM | BODY MASS INDEX: 45.56 KG/M2 | OXYGEN SATURATION: 97 %

## 2024-09-10 DIAGNOSIS — I10 ESSENTIAL HYPERTENSION: ICD-10-CM

## 2024-09-10 RX ORDER — AMLODIPINE BESYLATE 2.5 MG/1
TABLET ORAL
Qty: 90 TABLET | Refills: 1 | Status: SHIPPED | OUTPATIENT
Start: 2024-09-10

## 2024-09-25 RX ORDER — SODIUM CHLORIDE, SODIUM LACTATE, POTASSIUM CHLORIDE, CALCIUM CHLORIDE 600; 310; 30; 20 MG/100ML; MG/100ML; MG/100ML; MG/100ML
50 INJECTION, SOLUTION INTRAVENOUS CONTINUOUS
Status: CANCELLED | OUTPATIENT
Start: 2024-09-25

## 2024-09-26 ENCOUNTER — ANESTHESIA (OUTPATIENT)
Dept: GASTROENTEROLOGY | Facility: HOSPITAL | Age: 69
End: 2024-09-26
Payer: MEDICARE

## 2024-09-26 ENCOUNTER — ANESTHESIA EVENT (OUTPATIENT)
Dept: GASTROENTEROLOGY | Facility: HOSPITAL | Age: 69
End: 2024-09-26
Payer: MEDICARE

## 2024-09-26 ENCOUNTER — HOSPITAL ENCOUNTER (OUTPATIENT)
Dept: GASTROENTEROLOGY | Facility: HOSPITAL | Age: 69
Setting detail: OUTPATIENT SURGERY
End: 2024-09-26
Attending: INTERNAL MEDICINE
Payer: MEDICARE

## 2024-09-26 VITALS
BODY MASS INDEX: 44.63 KG/M2 | DIASTOLIC BLOOD PRESSURE: 69 MMHG | HEART RATE: 73 BPM | WEIGHT: 244 LBS | SYSTOLIC BLOOD PRESSURE: 113 MMHG | TEMPERATURE: 97.3 F | RESPIRATION RATE: 14 BRPM | OXYGEN SATURATION: 95 %

## 2024-09-26 DIAGNOSIS — R13.19 ESOPHAGEAL DYSPHAGIA: ICD-10-CM

## 2024-09-26 DIAGNOSIS — R10.13 EPIGASTRIC ABDOMINAL PAIN: ICD-10-CM

## 2024-09-26 DIAGNOSIS — K21.9 GASTROESOPHAGEAL REFLUX DISEASE WITHOUT ESOPHAGITIS: ICD-10-CM

## 2024-09-26 PROCEDURE — 88305 TISSUE EXAM BY PATHOLOGIST: CPT | Performed by: PATHOLOGY

## 2024-09-26 RX ORDER — PROPOFOL 10 MG/ML
INJECTION, EMULSION INTRAVENOUS AS NEEDED
Status: DISCONTINUED | OUTPATIENT
Start: 2024-09-26 | End: 2024-09-26

## 2024-09-26 RX ORDER — ALBUTEROL SULFATE 0.83 MG/ML
2.5 SOLUTION RESPIRATORY (INHALATION) ONCE AS NEEDED
OUTPATIENT
Start: 2024-09-26

## 2024-09-26 RX ORDER — ONDANSETRON 2 MG/ML
4 INJECTION INTRAMUSCULAR; INTRAVENOUS ONCE AS NEEDED
OUTPATIENT
Start: 2024-09-26

## 2024-09-26 RX ORDER — SODIUM CHLORIDE, SODIUM LACTATE, POTASSIUM CHLORIDE, CALCIUM CHLORIDE 600; 310; 30; 20 MG/100ML; MG/100ML; MG/100ML; MG/100ML
INJECTION, SOLUTION INTRAVENOUS CONTINUOUS PRN
Status: DISCONTINUED | OUTPATIENT
Start: 2024-09-26 | End: 2024-09-26

## 2024-09-26 RX ORDER — SODIUM CHLORIDE, SODIUM LACTATE, POTASSIUM CHLORIDE, CALCIUM CHLORIDE 600; 310; 30; 20 MG/100ML; MG/100ML; MG/100ML; MG/100ML
50 INJECTION, SOLUTION INTRAVENOUS CONTINUOUS
Status: DISPENSED | OUTPATIENT
Start: 2024-09-26

## 2024-09-26 RX ORDER — LIDOCAINE HYDROCHLORIDE 10 MG/ML
INJECTION, SOLUTION EPIDURAL; INFILTRATION; INTRACAUDAL; PERINEURAL AS NEEDED
Status: DISCONTINUED | OUTPATIENT
Start: 2024-09-26 | End: 2024-09-26

## 2024-09-26 RX ADMIN — PROPOFOL 50 MG: 10 INJECTION, EMULSION INTRAVENOUS at 12:38

## 2024-09-26 RX ADMIN — PROPOFOL 100 MG: 10 INJECTION, EMULSION INTRAVENOUS at 12:33

## 2024-09-26 RX ADMIN — LIDOCAINE HYDROCHLORIDE 50 MG: 10 SOLUTION INTRAVENOUS at 12:31

## 2024-09-26 RX ADMIN — SODIUM CHLORIDE, SODIUM LACTATE, POTASSIUM CHLORIDE, AND CALCIUM CHLORIDE: .6; .31; .03; .02 INJECTION, SOLUTION INTRAVENOUS at 12:16

## 2024-09-26 RX ADMIN — SODIUM CHLORIDE, SODIUM LACTATE, POTASSIUM CHLORIDE, AND CALCIUM CHLORIDE 50 ML/HR: .6; .31; .03; .02 INJECTION, SOLUTION INTRAVENOUS at 11:41

## 2024-09-26 RX ADMIN — PROPOFOL 50 MG: 10 INJECTION, EMULSION INTRAVENOUS at 12:35

## 2024-09-26 NOTE — INTERVAL H&P NOTE
H&P reviewed. After examining the patient I find no changes in the patients condition since the H&P had been written.    Vitals:    09/26/24 1132   BP: 136/77   Pulse: 75   Resp: 16   Temp: (!) 97.3 °F (36.3 °C)   SpO2: 98%

## 2024-09-26 NOTE — ANESTHESIA POSTPROCEDURE EVALUATION
Post-Op Assessment Note    CV Status:  Stable  Pain Score: 0    Pain management: adequate       Mental Status:  Alert and awake   Hydration Status:  Euvolemic   PONV Controlled:  Controlled   Airway Patency:  Patent     Post Op Vitals Reviewed: Yes      Staff: CRNA           /69 (09/26/24 1245)    Temp      Pulse 73 (09/26/24 1245)   Resp 14 (09/26/24 1245)    SpO2 96 % (09/26/24 1245)

## 2024-09-26 NOTE — DISCHARGE INSTR - AVS FIRST PAGE
Please call [phone] with any problems.  Your examination today was relatively normal.  I did dilate your esophagus.  I did biopsies to check for some unusual things.  Please follow-up in the office and continue on your acid suppression.

## 2024-09-30 PROCEDURE — 88305 TISSUE EXAM BY PATHOLOGIST: CPT | Performed by: PATHOLOGY

## 2024-11-05 ENCOUNTER — HOSPITAL ENCOUNTER (OUTPATIENT)
Dept: ULTRASOUND IMAGING | Facility: MEDICAL CENTER | Age: 69
Discharge: HOME | End: 2024-11-05
Payer: MEDICARE

## 2024-11-05 DIAGNOSIS — E04.1 NONTOXIC SINGLE THYROID NODULE: ICD-10-CM

## 2024-11-05 PROCEDURE — 76536 US EXAM OF HEAD AND NECK: CPT

## 2024-11-13 DIAGNOSIS — F41.9 ANXIETY: ICD-10-CM

## 2024-11-13 DIAGNOSIS — I10 BENIGN ESSENTIAL HYPERTENSION: ICD-10-CM

## 2024-11-13 RX ORDER — BUPROPION HYDROCHLORIDE 150 MG/1
150 TABLET ORAL EVERY MORNING
Qty: 90 TABLET | Refills: 1 | OUTPATIENT
Start: 2024-11-13

## 2024-11-13 RX ORDER — LOSARTAN POTASSIUM 50 MG/1
50 TABLET ORAL DAILY
Qty: 90 TABLET | Refills: 3 | OUTPATIENT
Start: 2024-11-13

## 2024-11-13 RX ORDER — VENLAFAXINE HYDROCHLORIDE 150 MG/1
150 CAPSULE, EXTENDED RELEASE ORAL DAILY
Qty: 90 CAPSULE | Refills: 1 | Status: SHIPPED | OUTPATIENT
Start: 2024-11-13

## 2024-11-25 DIAGNOSIS — F41.9 ANXIETY: ICD-10-CM

## 2024-11-25 DIAGNOSIS — I10 BENIGN ESSENTIAL HYPERTENSION: ICD-10-CM

## 2024-11-26 RX ORDER — BUPROPION HYDROCHLORIDE 150 MG/1
150 TABLET ORAL EVERY MORNING
Qty: 90 TABLET | Refills: 2 | Status: SHIPPED | OUTPATIENT
Start: 2024-11-26

## 2024-11-26 RX ORDER — LOSARTAN POTASSIUM 50 MG/1
50 TABLET ORAL DAILY
Qty: 90 TABLET | Refills: 2 | Status: SHIPPED | OUTPATIENT
Start: 2024-11-26

## 2024-12-05 ENCOUNTER — HOSPITAL ENCOUNTER (OUTPATIENT)
Dept: RADIOLOGY | Facility: HOSPITAL | Age: 69
Discharge: HOME | End: 2024-12-05
Attending: OTOLARYNGOLOGY
Payer: MEDICARE

## 2024-12-05 DIAGNOSIS — R13.14 PHARYNGOESOPHAGEAL DYSPHAGIA: ICD-10-CM

## 2024-12-05 LAB
25(OH)D3 SERPL-MCNC: 46 NG/ML (ref 30–100)
ALBUMIN SERPL-MCNC: 4.4 G/DL (ref 3.6–5.1)
ALBUMIN/GLOB SERPL: 1.9 (CALC) (ref 1–2.5)
ALP SERPL-CCNC: 74 U/L (ref 37–153)
ALT SERPL-CCNC: 25 U/L (ref 6–29)
AST SERPL-CCNC: 15 U/L (ref 10–35)
BILIRUB SERPL-MCNC: 0.6 MG/DL (ref 0.2–1.2)
BUN SERPL-MCNC: 19 MG/DL (ref 7–25)
BUN/CREAT SERPL: ABNORMAL (CALC) (ref 6–22)
CALCIUM SERPL-MCNC: 9.4 MG/DL (ref 8.6–10.4)
CHLORIDE SERPL-SCNC: 104 MMOL/L (ref 98–110)
CHOLEST SERPL-MCNC: 213 MG/DL
CHOLEST/HDLC SERPL: 4 (CALC)
CO2 SERPL-SCNC: 30 MMOL/L (ref 20–32)
CREAT SERPL-MCNC: 0.85 MG/DL (ref 0.5–1.05)
GFR/BSA.PRED SERPLBLD CYS-BASED-ARV: 75 ML/MIN/1.73M2
GLOBULIN SER CALC-MCNC: 2.3 G/DL (CALC) (ref 1.9–3.7)
GLUCOSE SERPL-MCNC: 105 MG/DL (ref 65–99)
HDLC SERPL-MCNC: 53 MG/DL
LDLC SERPL CALC-MCNC: 137 MG/DL (CALC)
NONHDLC SERPL-MCNC: 160 MG/DL (CALC)
POTASSIUM SERPL-SCNC: 4.8 MMOL/L (ref 3.5–5.3)
PROT SERPL-MCNC: 6.7 G/DL (ref 6.1–8.1)
SODIUM SERPL-SCNC: 142 MMOL/L (ref 135–146)
TRIGL SERPL-MCNC: 116 MG/DL
TSH SERPL-ACNC: 1.77 MIU/L (ref 0.4–4.5)
VIT B12 SERPL-MCNC: 305 PG/ML (ref 200–1100)

## 2024-12-05 PROCEDURE — 92611 MOTION FLUOROSCOPY/SWALLOW: CPT

## 2024-12-05 PROCEDURE — 74220 X-RAY XM ESOPHAGUS 1CNTRST: CPT

## 2024-12-05 PROCEDURE — 74230 X-RAY XM SWLNG FUNCJ C+: CPT

## 2024-12-05 NOTE — PROCEDURES
Video Swallow Study      Patient Name: Niki Marroquin  Today's Date: 2024        Past Medical History  Past Medical History:   Diagnosis Date    ACE-inhibitor cough     Last assessed 12/04/15    Anxiety     Arthritis     Chronic pain disorder     Depression     Disease of thyroid gland     benign nodule    History of transfusion     no adverse reaction    Hypertension     Wears glasses         Past Surgical History  Past Surgical History:   Procedure Laterality Date     SECTION      CHOLECYSTECTOMY      COLONOSCOPY  2017    Dr. Ricardo.  Hyperplastic sigmoid polyp.    COLONOSCOPY  10/14/2010    Dr. Ricardo. Hyperplastic rectal polyp.    COLONOSCOPY  2005    Dr. Larson.  Tubular adenoma polyps in the ascending and descending colon.    COLONOSCOPY  10/07/1999    Dr. Larson.  Normal exam.    [facility] Polyps in cecum, transverse colon and rectum. Bx: Tubular adenoma, fragments; Neg. for high grade dysplasia; hyperplastic polyp, microvesicular type, fragments; neg. for dysplasia.    EGD  2011    Dr. Ricardo.  Normal exam, negative H. pylori.    [facility] in body of stomach; dilated to 54mm end size. Bx: Benign oxyntic mucosa with mild chronic inactive gastritis, foveolar and smooth muscle hyperplasia and vascular congestion; neg. for curvilinear Helicobacter organisms, glandular atrophy, intestinal metaplasia, dysplasia, malignancy; fundic gland polyp; no active esophagitis and intraepithelial eosinophils.    JOINT REPLACEMENT Left     hip    [place] ARTHRP ACETBLR/PROX FEM PROSTC AGRFT/ALGRFT Right 07/10/2018    Procedure: ARTHROPLASTY HIP TOTAL;  Surgeon: Emir Duarte MD;  Location: [facility];  Service: Orthopedics    TONSILLECTOMY AND ADENOIDECTOMY      TUBAL LIGATION      VAGINAL HYSTERECTOMY Bilateral 2007    nd ooperectomy     Modified (Video) Barium Swallow  Study    Summary:  Images are on PACS for review.     Oral stage:  WNL for lip closure, mastication, bolus formation, control, and transfer. Min to no oral residue.     Pharyngeal stage:  WNL for prompt swallow, velopharyngeal closure, laryngeal elevation, anterior hyoid excursion, epiglottic inversion,  laryngeal vestibular closure, pharyngeal constriction, passage through UES/UES opening, and tongue base retraction. Trace to no pharyngeal retention. No penetration or aspiration.  Osteophytes noted at C5 and greater at C6/7. Material passed adequately.     Per gross esophageal screen:Stasis throughout the esophagus following purees and solids. Minimal movement noted w/ cued dry swallow x 2.Most residue cleared w/ liquid wash. Min retrograde flow. Mild distal thin liquid retention. Distal retention of pill, thin liquid retention. AP view puree did not clear the pill. Esophageal stasis throughout. Waited briefly. Pt belched and the pill cleared. Delayed clearance and retrograde flow w/ nectar.     Pt reported sensation in her neck/throat. She was shown on imaging that the material was in her esophagus, not her throat and this was likely a referred sensation, often reported by patients.     Recommendations:  Diet: Regular. Avoid dry/dense foods or add condiments as needed.   Liquids:thin  Meds:take one at a time if larger. Follow w/ additional liquid (currently takes several at a time)  Strategies: chew well, small sips after every few bites, smaller meals if needed.  Frequent/thorough oral care  Upright position  F/u ST tx:No  Reflux Precautions. Already elevates HOB  Consider consult with: GI, recent EGD in September  Results reviewed with: pt  Repeat MBS as necessary  If a dedicated assessment of the esophagus is desired:  Consider [place] Upper GI/UGI or EGD     Pt is a 69yof referred by ENT for solid food dysphagia. Pt reported difficulty w/ chicken, bread.     Barium Swallow/esophagram done just prior to this  test:  IMPRESSION:  Delayed esophageal emptying noted on prone drinking images.  Gastroesophageal reflux.    Functional Oral Intake Scale (FOIS)  Avelino Nunez PhD, F-LISSA  (Does not include liquids)  7. Total oral diet with no restriction.      H&P/pertinent provider notes: (PMH noted above)  Per visit w/ ENT:  Patient ID: Niki Marroquin is a 69 y.o. female.  Niki is here for evaluation of dysphagia.  She has a difficult time swallowing solids and meats.  She is not sure if she is chewing well.  She feels that food is getting stuck in right side of the throat for the past few years.  It has not changed despite esophageal dilation. She has never had any swallowing study.   She also feels like the ears are blocked up and the left side of the nose is congested for a few weeks.    Finally she states that if she bends over she feels dizzy - she had BPPV in the past and it does not feel the same - she was treated via PT in the past for this.  Niki presented for evaluation of dysphagia.  Physical findings with NPL demonstrated some interarytenoid edema and mucus webbing.  I do not feel that this is causing her symptoms.  I have recommended that she have a modified swallow study and barium swallow for further evaluation.  It is possible that this is related to her thyroid mass but I would like to make sure before recommending any removal of the mass.   She had a feeling that the ears were blocked but the examination was normal.  She denied any loss of hearing at the time of the visit.  I did not have an audiologist at the time of the visit.   I have sent her home with some vestibular exercises and asked her to complete them a few times a day.  If she does not get any relief I will be glad to refer her back to Vestibular therapy    Special Studies:  EGD  9/26/2024:  INDICATION:  Esophageal dysphagia, Epigastric abdominal pain, Gastroesophageal reflux disease without esophagitis  IMPRESSION:  The cardia, fundus of the  stomach and duodenum appeared normal.  Two polyps measuring smaller than 5 mm in the body of the stomach; performed cold forceps biopsy with partial removal  Performed forceps biopsies in the body of the stomach to rule out H. pylori  The esophagus appeared normal. Performed random biopsy to rule out eosinophilic esophagitis.  Dilated to 54 mm end size  RECOMMENDATION:  Await pathology results   Follow up with GI Clinic     Continue PPI    Thyroid ultrasound - 11/5/24 -  FINDINGS:  Thyroid texture: Normal homogeneous smooth echotexture.  Right lobe: 5.8 x 1.3 x 1.8 cm. Volume 6.2 mL  Left lobe: 6.1 x 1.5 x 2.6 cm. Volume 11.7 mL  Isthmus: 0.3 cm.  Nodule #1. Image 35.  LEFT midgland nodule measuring 3.6 x 1.3 x 1.8 cm. Stable when accounting for differences in measurement technique.  COMPOSITION: 2 points, solid or almost completely solid.  ECHOGENICITY: 1 point, hyperechoic or isoechoic.  SHAPE: 0 points, wider-than-tall.  MARGIN: 0 points, smooth.  ECHOGENIC FOCI: 0 points, none or large comet-tail artifacts.  TI-RADS Classification: TR 3 (3 points), FNA if >/= 2.5 cm. Follow if >/= 1.5 cm.    Previous MBS:  None in epic      Food allergies: nkfa   Current diet: regular   Premorbid diet: regular   Dentition: natural   O2 requirement: RA   Oral mech: WNL   Vocal quality/speech: WNL   Cognitive status: Alert and pleasant       Consistencies administered: Puree, nutrigrain bar, hard cookie,  bread, barium pill w/ thin, thin, nectar.    Pt was viewed standing laterally and AP

## 2024-12-06 ENCOUNTER — RESULTS FOLLOW-UP (OUTPATIENT)
Dept: FAMILY MEDICINE CLINIC | Facility: CLINIC | Age: 69
End: 2024-12-06

## 2024-12-07 NOTE — RESULT ENCOUNTER NOTE
I see the patient in February but I want to make her aware the B12 is a little bit low.  She can supplement with daily vitamin B12 1000 mcg

## 2024-12-17 DIAGNOSIS — F41.9 ANXIETY: ICD-10-CM

## 2024-12-17 DIAGNOSIS — I10 ESSENTIAL HYPERTENSION: ICD-10-CM

## 2024-12-17 DIAGNOSIS — I10 BENIGN ESSENTIAL HYPERTENSION: ICD-10-CM

## 2024-12-17 RX ORDER — VENLAFAXINE HYDROCHLORIDE 150 MG/1
150 CAPSULE, EXTENDED RELEASE ORAL DAILY
Qty: 90 CAPSULE | Refills: 2 | Status: SHIPPED | OUTPATIENT
Start: 2024-12-17

## 2024-12-17 RX ORDER — AMLODIPINE BESYLATE 2.5 MG/1
2.5 TABLET ORAL DAILY
Qty: 90 TABLET | Refills: 2 | Status: SHIPPED | OUTPATIENT
Start: 2024-12-17

## 2024-12-17 RX ORDER — LOSARTAN POTASSIUM 50 MG/1
50 TABLET ORAL DAILY
Qty: 90 TABLET | Refills: 2 | Status: SHIPPED | OUTPATIENT
Start: 2024-12-17

## 2024-12-17 RX ORDER — BUPROPION HYDROCHLORIDE 150 MG/1
150 TABLET ORAL EVERY MORNING
Qty: 90 TABLET | Refills: 2 | Status: SHIPPED | OUTPATIENT
Start: 2024-12-17

## 2025-02-04 ENCOUNTER — RA CDI HCC (OUTPATIENT)
Dept: OTHER | Facility: HOSPITAL | Age: 70
End: 2025-02-04

## 2025-02-10 ENCOUNTER — OFFICE VISIT (OUTPATIENT)
Dept: FAMILY MEDICINE CLINIC | Facility: CLINIC | Age: 70
End: 2025-02-10
Payer: MEDICARE

## 2025-02-10 VITALS
HEART RATE: 92 BPM | BODY MASS INDEX: 43.24 KG/M2 | SYSTOLIC BLOOD PRESSURE: 142 MMHG | TEMPERATURE: 97.4 F | HEIGHT: 62 IN | WEIGHT: 235 LBS | OXYGEN SATURATION: 98 % | DIASTOLIC BLOOD PRESSURE: 82 MMHG

## 2025-02-10 DIAGNOSIS — E04.1 NONTOXIC SINGLE THYROID NODULE: ICD-10-CM

## 2025-02-10 DIAGNOSIS — G47.33 OSA (OBSTRUCTIVE SLEEP APNEA): ICD-10-CM

## 2025-02-10 DIAGNOSIS — I10 ESSENTIAL HYPERTENSION: Primary | Chronic | ICD-10-CM

## 2025-02-10 DIAGNOSIS — E78.5 DYSLIPIDEMIA: ICD-10-CM

## 2025-02-10 DIAGNOSIS — R93.1 AGATSTON CAC SCORE, <100: ICD-10-CM

## 2025-02-10 DIAGNOSIS — F41.9 ANXIETY: ICD-10-CM

## 2025-02-10 PROCEDURE — G2211 COMPLEX E/M VISIT ADD ON: HCPCS | Performed by: FAMILY MEDICINE

## 2025-02-10 PROCEDURE — 99214 OFFICE O/P EST MOD 30 MIN: CPT | Performed by: FAMILY MEDICINE

## 2025-02-10 NOTE — PROGRESS NOTES
Name: Niki Marroquin      : 1955      MRN: [id number]  Encounter Provider: Dayami Mckinney DO  Encounter Date: 2/10/2025   Encounter department: [facility]  :  Assessment & Plan  Essential hypertension  Continue current medication regimen, keep salt under 1500 mg Daily.       Anxiety  The patient currently on bupropion and Effexor and stable.  Continue same.  Maybe even consider weaning off postretirement..  Dyslipidemia  Patient with borderline total cholesterol and LDL elevation patient had CT angiogram with calcium score of 16       KENY (obstructive sleep apnea)  Will be due for follow-up with sleep medicine.  Patient would like to proceed with weight loss regimen to improve KENY.  She was less than tolerant of the CPAP.       Nontoxic single thyroid nodule  Subsequent ultrasounds show stable size FNA :Negative for Diagnostic Malignancy.Descriptive Interpretation: Reactive follicular cells, colloid and hemosiderinladen macrophages present.  Findings are suggestive of nodular hyperplasia.        BMI 40.0-44.9, adult (HCC)  Patient is medicated with recent weight loss on current management.  Encourage increase fitness as well.       Agatston CAC score = 16  CTA cardiac  did review.  Pollard 10-year ASCVD calculation risk score              History of Present Illness   Patient is a 61-year-old female here for 6-month follow-up and review labs      Chief Complaint   Patient presents with   • Follow-up     6 month Ov     Review of Systems   Constitutional:         Patient reports weight loss of 13 lbs using [employer] service and intermittent fasting.  Anticipating residential.  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   Respiratory:  Negative for shortness of breath.    Cardiovascular:  Negative for chest pain and palpitations.   Musculoskeletal:  Negative for back pain (Back is about the same.).   Skin:  Negative for rash.     Component      Latest  "Ref Rng 7/29/2023 12/4/2024   GLUCOSE      65 - 99 mg/dL  105 (H)    BUN      7 - 25 mg/dL  19    Creatinine      0.50 - 1.05 mg/dL  0.85    GFR, Calculated      > OR = 60 mL/min/1.73m2  75    SL AMB BUN/CREATININE RATIO      6 - 22 (calc)  SEE NOTE:    Sodium      135 - 146 mmol/L  142    Potassium      3.5 - 5.3 mmol/L  4.8    Chloride      98 - 110 mmol/L  104    Carbon Dioxide      20 - 32 mmol/L  30    Calcium      8.6 - 10.4 mg/dL  9.4    Total Protein      6.1 - 8.1 g/dL  6.7    Albumin      3.6 - 5.1 g/dL  4.4    Globulin      1.9 - 3.7 g/dL (calc)  2.3    Albumin/Globulin Ratio      1.0 - 2.5 (calc)  1.9    Total Bilirubin      0.2 - 1.2 mg/dL  0.6    ALK PHOS      37 - 153 U/L  74    AST      10 - 35 U/L  15    ALT      6 - 29 U/L  25    Cholesterol      <200 mg/dL 196  213 (H)    HDL      > OR = 50 mg/dL 50  53    Triglycerides      <150 mg/dL 138  116    LDL Calculated      mg/dL (calc) 120 (H)  137 (H)    Chol/HDL Ratio      <5.0 (calc) 3.9  4.0    Non-HDL Cholesterol      <130 mg/dL (calc) 146 (H)  160 (H)    TSH, POC      0.40 - 4.50 mIU/L  1.77    Vitamin B-12      200 - 1,100 pg/mL  305    EXTERNAL VITAMIN D,25      30 - 100 ng/mL  46       Media Information  .  May be able to titrate off  Document Information    Cardiology Results Ext      02/10/2025   Attached To:   Office Visit on 2/10/25 with Dayami Mckinney DO   Source Information    Dayami Mckinney DO  [facility]   Document History      Objective   /82   Pulse 92   Temp (!) 97.4 °F (36.3 °C) (Temporal)   Ht 5' 2\" (1.575 m)   Wt 107 kg (235 lb)   SpO2 98%   BMI 42.98 kg/m²      Physical Exam  Vitals and nursing note reviewed.   Constitutional:       Appearance: Normal appearance.   HENT:      Head: Normocephalic.      Right Ear: Tympanic membrane normal.      Left Ear: Tympanic membrane normal.      Nose: Nose normal.      Mouth/Throat:      Mouth: Mucous membranes are moist.   Eyes:      Pupils: Pupils are " equal, round, and reactive to light.   Cardiovascular:      Rate and Rhythm: Normal rate and regular rhythm.      Heart sounds: Normal heart sounds.   Pulmonary:      Effort: Pulmonary effort is normal.      Breath sounds: Normal breath sounds. No wheezing.   Abdominal:      Palpations: Abdomen is soft.   Musculoskeletal:      Comments: Gait steady   Skin:     Findings: No rash.   Neurological:      Mental Status: She is alert and oriented to person, place, and time.   Psychiatric:         Mood and Affect: Mood normal.         Behavior: Behavior normal.         Thought Content: Thought content normal.         Judgment: Judgment normal.

## 2025-03-11 NOTE — ASSESSMENT & PLAN NOTE
Will be due for follow-up with sleep medicine.  Patient would like to proceed with weight loss regimen to improve KENY.  She was less than tolerant of the CPAP.

## 2025-03-11 NOTE — ASSESSMENT & PLAN NOTE
The patient currently on bupropion and Effexor and stable.  Continue same.  Maybe even consider weaning off postretirement..

## 2025-03-17 NOTE — ASSESSMENT & PLAN NOTE
Subsequent ultrasounds show stable size FNA 2006:Negative for Diagnostic Malignancy.Descriptive Interpretation: Reactive follicular cells, colloid and hemosiderinladen macrophages present.  Findings are suggestive of nodular hyperplasia.

## 2025-03-17 NOTE — ASSESSMENT & PLAN NOTE
Patient is medicated with recent weight loss on current management.  Encourage increase fitness as well.

## 2025-06-06 NOTE — CONSULTS
Consultation - Internal Medicine  Martinezhumberto Rubi 59 y o  female MRN: [id number]  Unit/Bed#: E2 -01 Encounter: [id number]      Impression :-    This is a very delightful  59 y o  female patient, who has undergone elective right total hip replacement earlier today by Dr David Person  Advanced end-stage osteoarthritis right hip, failed conservative treatments  Obesity with BMI of 33 84  Benign essential hypertension   Preoperative mild thrombocytopenia, platelet count 146 on 06/19/2018  Ambulatory dysfunction  Anxiety disorder  History of irritable bowel syndrome  Chronic lower back pain due to scoliosis and DJD  History of  B12 deficiency/vitamin-D deficiency  Severe multilevel degenerative changes complicated by left convex thoracolumbar rotoscoliosis and kyphosis  Severe left L4-5 foraminal stenosis        Recommendation: -    Patient seems to be recuperating well, we reviewed with her in detail about pain medications on numeric pain scale  Patient educated on benefits and potential side effects of narcotic versus non narcotic medications  Reviewed with the pharmacist and he indicated that losartan is not on formulary in the hospital   This will be substituted to valsartan 40 mg p o  daily, hold if blood pressures less than 185 systolic  Patient is unable to bring her home medicines  If she has any hypotension then I would discontinue this medicine and also utilized postoperative orthopedic hypotension protocol  I have reviewed patients medications as initiated on post operative orders by the primary team  Recommend  monitoring closely for any hypoxemia / respiratory insufficieny related to narcotics and to reduce doses and frequency of narcotics if necessary  Patient may resume her Effexor 150 mg p o  daily from tomorrow  Recheck her platelet count with a m  labs  Maintain Intravenous Fluids for next 24 -hours, till patient able to reliably keep meals and meds in   Suggest  Zofran ODT 4 mg sublingually PRN for control of post operative nausea  Patient encouraged to  use Incentive spirometry q 15 minutes while awake to minimize risk of postoperative atelectasis and pneumonia  Patient verbalized to understand and fully comprehend  Recommend DVT prophylaxis with use of Venodyne compression boots  If any factor X A inhibitor,  low molecule weight heparin or Coumadin is planned by the primary team, we will be happy to dose that  drug based on patient's hemostasis  Maintain ASA 81 mg as antiplatelet drug per Ortho  Team  Use Proton Pump inhibitor to minimize risk of gastritis  Monitor for any tinnitus as an early sign of salicylism and check salicylate levels in that situation  Discontinue patient's Mejia catheter within next 24-48 hours in order  to minimize risk of catheter associated UTI  Please check patient's hemoglobin and hematocrit within next 24 hours to ensure that there is no acute blood loss anemia which would need any blood transfusion  We will follow this pleasant patient with your service closely and recommend necessary changes based on  further hospital course and diagnostics  Thank you, Dr Glenna Nettles , for your kind consultation  HISTORY OF PRESENT ILLNESS:    Reason for Consult:  Post operative management following right hip replacement    HPI: Jd Rubi is a 59 y o  female who is known to the orthopedic service from previous left hip replacement  She reported that she had excellent results following her left hip replacement early 7 years ago  For past few years her right hip had been bothering her and she was trying to avoid any surgery  She was trying to use nonsteroidal anti-inflammatory medications including oxycodone at times and also meloxicam   This only helped her initially but eventually stopped working  She was having pain all the time and was almost constant 10/10 on numeric pain scale    Patient works for an accounting firm and indicates that she sat most of the time but even that was becoming increasingly hard  She had Marcaine injection 1 time in August of 2017 with only temporary relief  Patient has also tried Celebrex which did not help her  She has been followed by Dr Glenna Nettles in his office as outpatient  Patient also reported that this past winter while she was carrying a bag of salt she had a fall which also exacerbated some of her symptoms  Initially the pain was in her groin but then it started radiating towards the lateral aspect  She continues to work as an  and has to take Advil to carry on her activities  Her quality of life and activities of daily living were getting affected  She had both clinical and radiographic workup which confirmed severity of her arthritis and she was given option either to continue conservative treatments or to proceed with more definitive joint surgery  At present she was resting comfortably, her friend was in the room as it is her birthday they are celebrating with a cupcake  Patient was wished on her birthday  She is a very pleasant cheerful lady  Did not appear to be in any distress and denied any pain symptoms  Review of Systems   Constitutional:  No chills, diaphoresis, fatigue or fever  HEENT:  No congestion, sore throat and tinnitus  No visual complaints  Respiratory:  No cough, chest tightness, shortness of breath and wheezing  Cardiovascular:  No chest pain and palpitations  Gastrointestinal: Negative for abdominal distention or pain, constipation, diarrhea and nausea  Genitourinary:                Negative for  dysuria, flank pain  Tolerating Mejia without difficulty  Skin:   Negative for color change, pallor and rash  Neurological:   Negative for facial asymmetry, speech difficulty, weakness, light-headedness, numbness and headaches  Hematological:  Musculoskeletal:  Psychiatric:  No h/o spontaneous bruising/bleeding  Joint pains as above     Negative for agitation, dysphoric mood and sleep disturbance  A complete system-based review of systems as discussed with patient is otherwise negative  PAST MEDICAL HISTORY:  Past Medical History:   Diagnosis Date    ACE-inhibitor cough     Last assessed 12/04/15    Anxiety     Arthritis     Chronic pain disorder     Depression     History of transfusion     no adverse reaction    Hypertension     Wears glasses      Past Surgical History:   Procedure Laterality Date     SECTION      CHOLECYSTECTOMY      COLONOSCOPY      JOINT REPLACEMENT Left     hip    TONSILLECTOMY AND ADENOIDECTOMY      TUBAL LIGATION      VAGINAL HYSTERECTOMY Bilateral 2007    nd ooperectomy       FAMILY HISTORY:  Non-contributory    SOCIAL HISTORY:  History   Alcohol Use    1 8 oz/week    3 Glasses of wine per week     Comment: on the weekend; not much     History   Drug Use No     History   Smoking Status    Former Smoker    Quit date:    Smokeless Tobacco    Never Used       ALLERGIES:  Allergies   Allergen Reactions    Lisinopril Cough    Ace Inhibitors Cough     Category: Adverse Reaction;        MEDICATIONS:  All current active medications have been reviewed    Current Facility-Administered Medications   Medication Dose Route Frequency Provider Last Rate Last Dose    acetaminophen (TYLENOL) tablet 650 mg  650 mg Oral Q6H PRN [facility], PA-C        aspirin chewable tablet 81 mg  81 mg Oral BID [facility], PA-[facility]        bisacodyl (DULCOLAX) rectal suppository 10 mg  10 mg Rectal Daily PRN [facility], PA-[facility]        calcium carbonate (TUMS) chewable tablet 1,000 mg  1,000 mg Oral Daily PRN [facility], PA-C        ceFAZolin (ANCEF) IVPB (premix) 1,000 mg  1,000 mg Intravenous Q8H [facility], PA-C        celecoxib (CeleBREX) capsule 200 mg  200 mg Oral Daily [facility], PA-C   200 mg at 07/10/18 1456    diphenhydrAMINE (BENADRYL) injection 50 mg  50 mg Intravenous Q6H PRN [facility], PA-C  docusate sodium (COLACE) capsule 100 mg  100 mg Oral BID Charmaine Gala, PA-C        ketorolac (TORADOL) injection 15 mg  15 mg Intravenous Q6H PRN Charmaine Gala, PA-C        lactated ringers infusion  100 mL/hr Intravenous Continuous Charmaine Gala, PA-C 100 mL/hr at 07/10/18 1500 100 mL/hr at 07/10/18 1500    methocarbamol (ROBAXIN) tablet 500 mg  500 mg Oral Q6H PRN Charmaine Gala, PA-C        morphine injection 2 mg  2 mg Intravenous Q2H PRN Charmaine Gala, PA-C        multivitamin-minerals (CENTRUM) tablet 1 tablet  1 tablet Oral Daily Charmaine Gala, PA-C   1 tablet at 07/10/18 1456    naloxone (NARCAN) injection 0 04 mg  0 04 mg Intravenous Q1MIN PRN Charmaine Gala, PA-C        ondansetron [facility]) injection 4 mg  4 mg Intravenous Q8H PRN Charmaine Gala, PA-C        oxyCODONE (ROXICODONE) immediate release tablet 10 mg  10 mg Oral Q4H PRN Charmaine Gala, PA-C   10 mg at 07/10/18 1457    oxyCODONE (ROXICODONE) IR tablet 5 mg  5 mg Oral Q4H PRN Charmaine Gala, PA-C        sodium chloride 0 9 % infusion  125 mL/hr Intravenous Continuous Cody Molina MD   Stopped at 07/10/18 1500    traMADol (ULTRAM) tablet 50 mg  50 mg Oral Q6H PRN Charmaine Gala, PA-C        zolpidem (AMBIEN) tablet 5 mg  5 mg Oral HS PRN Charmaine Gala, PA-C           Prescriptions Prior to Admission   Medication    Ascorbic Acid (VITAMIN C-ANNA HIPS ER) 1000 MG TBCR    b complex vitamins capsule    Cholecalciferol (VITAMIN D PO)    ibuprofen (ADVIL) 200 mg tablet    losartan (COZAAR) 50 mg tablet    Magnesium 200 MG TABS    venlafaxine (EFFEXOR-XR) 150 mg 24 hr capsule    Multiple Vitamin (MULTIVITAMINS PO)           PHYSICAL EXAM:    Vitals:  HR:  [56-80] 80  Resp:  [13-20] 16  BP: (118-149)/(70-91) 118/78  SpO2:  [94 %-100 %] 99 %  Temp (24hrs), Av 5 °F (36 4 °C), Min:97 4 °F (36 3 °C), Max:97 7 °F (36 5 °C)  Current: Temperature: 97 7 °F (36 5 °C)  Body mass index is 33 84 kg/m²      General Appearance:    Awake, alert, cooperative, no distress, appears of stated age  Head:    Normocephalic, without obvious abnormality, atraumatic   Eyes:    Pupils equal in size,conjunctiva/corneas clear, EOM's intact  Ears:    External ear canals, both ears no drainage or redness  Nose:   No evidence of epistaxis/ discharge from nares  Throat:   Lips, mucosa, and tongue normal    Neck:   Supple, symmetrical, trachea midline, no JVP  Back:     Mild scoliosis noted  No tenderness   Lungs:     Bilateral air entry is broncho-alveolar and equal        No crepitation or rales  No pleural rub  Heart:    Regular rate and rhythm, S1S2 normal, no murmur  Abdomen:     Soft, slight central obesity, non-tender, no masses, no organomegaly   Genitalia:   Indwelling Mejia catheter  Clear urine +, No hematuria  Musculoskeletal:   No cyanosis or edema  Surgical site on the right hip has clear dressing / no bleeding or hemorrhage apparent  Left hip has a clean well-healed surgical scar yuri  Vascular:   2+ in Posterior tibialis / dorsalis pedis  Skin:   Skin color, texture, turgor normal, no rashes or lesions   Neurologic:   Oriented/ Awake/ facial symmetry maintained  Speech is intact  Muscle bulk and strength is equivocal in B/L Upper and lower extremities except on operated right lower limb  Light Touch sensation is intact B/l LE  B/L [place]anta flexion is WNL  LABS, IMAGING, & OTHER STUDIES:  Lab Results:  I have personally reviewed pertinent labs    Lab Results   Component Value Date     06/19/2018     06/19/2018    CO2 28 06/19/2018    ANIONGAP 8 06/19/2018    BUN 21 06/19/2018    CREATININE 0 63 06/19/2018    EGFR 97 06/19/2018    GLUCOSE 94 06/19/2018    CALCIUM 9 3 06/19/2018    AST 18 06/19/2018    ALT 25 06/19/2018    ALKPHOS 76 06/19/2018    PROT 7 0 06/19/2018    BILITOT 0 53 06/19/2018     Lab Results   Component Value Date    WBC 4 78 06/19/2018    WBC 5 8 07/09/2016    HGB 13 3 06/19/2018    HGB 13 2 07/09/2016    PLT 133 (L) 06/19/2018     07/09/2016       Lab Results   Component Value Date    HGBA1C 5 0 07/09/2016         Imaging Studies:   I have personally reviewed pertinent imaging study reports  Imaging:     Xr Chest Pa & Lateral    Result Date: 6/23/2018  Narrative:   CHEST INDICATION:   Z01 818: Encounter for other preprocedural examination M16 11: Unilateral primary osteoarthritis, right hip  COMPARISON:  07/06/2016 EXAM PERFORMED/VIEWS:    XR CHEST PA & LATERAL  The frontal view was performed utilizing dual energy radiographic technique  Images: 4   FINDINGS: Cardiomediastinal silhouette appears unremarkable  The lungs are clear  No pneumothorax or pleural effusion  S-shaped scoliosis  Impression: No acute cardiopulmonary disease  Xr Hip/pelv 1 Vw Right     Result Date: 7/10/2018  Narrative: RIGHT HIP INDICATION:   post op  COMPARISON:  None VIEWS:  XR HIP/PELV 1 VW RIGHT  W PELVIS IF PERFORMED   FINDINGS: There is no acute fracture or dislocation  Right total hip arthroplasty is identified in satisfactory position without evidence of hardware complication  No lytic or blastic osseous lesions  Soft tissue emphysema adjacent to the right hip, likely postsurgical      Impression: Satisfactory appearance of right total hip arthroplasty  MRI LUMBAR SPINE WITHOUT CONTRAST     INDICATION:  Chronic low back pain radiating into buttocks bilaterally     COMPARISON:  X-ray 6/23/2017     TECHNIQUE:  Sagittal T1, sagittal T2, sagittal inversion recovery, axial T1 and axial T2, coronal T2        IMAGE QUALITY:  Diagnostic     FINDINGS:     ALIGNMENT:  Kyphosis at the thoracolumbar junction  Left convex rotoscoliosis thoracic lumbar junction  Asymmetric loss of disc height to the right L1-2, leftward translation of L2 with marginal osteophytes, leftward translation of L3    Asymmetric loss   of disc height left L4-5      MARROW SIGNAL:  Normal marrow signal is identified within the visualized bony structures  No discrete marrow lesion      DISTAL CORD AND CONUS:  Normal size and signal within the distal cord and conus  The conus ends at the L1 level      PARASPINAL SOFT TISSUES:       Spinal soft tissues are distorted by the severe scoliosis      SACRUM:  Normal signal within the sacrum  No evidence of insufficiency or stress fracture      LOWER THORACIC DISC SPACES:  Normal disc height and signal   No disc herniation, canal stenosis or foraminal narrowing      LUMBAR DISC SPACES:          L1-L2:  Severe right greater than left facet arthrosis, bridging marginal osteophytes, minor right foraminal stenosis      L2-L3:  Bilateral facet arthrosis, leftward translation of L4 to foramen are patent  Osteophyte in contact with post foraminal right L2 root without compression      L3-L4:  Leftward translation of L3 and bilateral, left greater than right facet arthrosis  Disc and osteophyte extending asymmetrically to the left, no definite root compression      L4-L5:  Asymmetric loss of disc height to the left  Marginal osteophytes and leftward extension of disc osteophyte with severe foraminal stenosis  Correlate for left L4 radiculitis      L5-S1:  Bilateral facet arthrosis, circumferential bulge and marginal osteophytes but     IMPRESSION:     Severe multilevel degenerative changes complicated by left convex thoracolumbar rotoscoliosis and kyphosis  Severe left L4-5 foraminal stenosis, correlate for left L4 radiculitis        EKG, Pathology, and Other Studies:   I have personally reviewed pertinent reports  Normal sinus rhythm  Poor anterior R wave progression is noted  Otherwise normal ECG  When compared with ECG of 19-JUN-2018 09:25,  No significant change was found    Portions of the record may have been created with voice recognition software  Occasional wrong word or "sound a like" substitutions may have occurred due to the inherent limitations of voice recognition software   Read the chart carefully and recognize, using context, where substitutions have occurred  Hypertensive Disorder

## 2025-07-29 ENCOUNTER — RA CDI HCC (OUTPATIENT)
Dept: OTHER | Facility: HOSPITAL | Age: 70
End: 2025-07-29

## 2025-08-04 ENCOUNTER — OFFICE VISIT (OUTPATIENT)
Dept: INTERNAL MEDICINE CLINIC | Facility: CLINIC | Age: 70
End: 2025-08-04
Payer: MEDICARE

## 2025-08-04 VITALS
HEIGHT: 62 IN | RESPIRATION RATE: 18 BRPM | DIASTOLIC BLOOD PRESSURE: 80 MMHG | WEIGHT: 232.4 LBS | SYSTOLIC BLOOD PRESSURE: 134 MMHG | TEMPERATURE: 97.7 F | BODY MASS INDEX: 42.76 KG/M2 | OXYGEN SATURATION: 97 % | HEART RATE: 74 BPM

## 2025-08-04 DIAGNOSIS — E04.1 NONTOXIC SINGLE THYROID NODULE: ICD-10-CM

## 2025-08-04 DIAGNOSIS — Q21.12 PFO (PATENT FORAMEN OVALE): ICD-10-CM

## 2025-08-04 DIAGNOSIS — F41.9 ANXIETY: ICD-10-CM

## 2025-08-04 DIAGNOSIS — G47.33 OSA (OBSTRUCTIVE SLEEP APNEA): ICD-10-CM

## 2025-08-04 DIAGNOSIS — I10 ESSENTIAL HYPERTENSION: Primary | Chronic | ICD-10-CM

## 2025-08-04 DIAGNOSIS — I87.2 VENOUS INSUFFICIENCY: ICD-10-CM

## 2025-08-04 PROCEDURE — 99214 OFFICE O/P EST MOD 30 MIN: CPT | Performed by: INTERNAL MEDICINE

## 2025-08-04 PROCEDURE — G2211 COMPLEX E/M VISIT ADD ON: HCPCS | Performed by: INTERNAL MEDICINE

## 2025-08-21 ENCOUNTER — OFFICE VISIT (OUTPATIENT)
Dept: INTERNAL MEDICINE CLINIC | Facility: CLINIC | Age: 70
End: 2025-08-21
Payer: MEDICARE

## 2025-08-21 VITALS
OXYGEN SATURATION: 97 % | RESPIRATION RATE: 18 BRPM | HEIGHT: 62 IN | HEART RATE: 78 BPM | TEMPERATURE: 96 F | WEIGHT: 234 LBS | SYSTOLIC BLOOD PRESSURE: 142 MMHG | DIASTOLIC BLOOD PRESSURE: 90 MMHG | BODY MASS INDEX: 43.06 KG/M2

## 2025-08-21 DIAGNOSIS — R05.2 SUBACUTE COUGH: Primary | ICD-10-CM

## 2025-08-21 PROCEDURE — 99213 OFFICE O/P EST LOW 20 MIN: CPT | Performed by: INTERNAL MEDICINE

## 2025-08-21 PROCEDURE — G2211 COMPLEX E/M VISIT ADD ON: HCPCS | Performed by: INTERNAL MEDICINE

## 2025-08-21 RX ORDER — BENZONATATE 200 MG/1
200 CAPSULE ORAL 3 TIMES DAILY PRN
Qty: 30 CAPSULE | Refills: 0 | Status: SHIPPED | OUTPATIENT
Start: 2025-08-21

## (undated) DEVICE — ANTIBACTERIAL UNDYED BRAIDED (POLYGLACTIN 910), SYNTHETIC ABSORBABLE SUTURE: Brand: COATED VICRYL

## (undated) DEVICE — SURGICAL GOWN, XL SMARTSLEEVE: Brand: CONVERTORS

## (undated) DEVICE — NEEDLE 22 G X 1 1/2 SAFETY

## (undated) DEVICE — 3M™ IOBAN™ 2 ANTIMICROBIAL INCISE DRAPE 6648EZ: Brand: IOBAN™ 2

## (undated) DEVICE — MAYO STAND COVER: Brand: CONVERTORS

## (undated) DEVICE — 3M™ DURAPORE™ SURGICAL TAPE 1538-3, 3 INCH X 10 YARD (7,5CM X 9,1M), 4 ROLLS/BOX: Brand: 3M™ DURAPORE™

## (undated) DEVICE — ADHESIVE SKIN CLOSR DERMABOND PRINEO

## (undated) DEVICE — GLOVE INDICATOR PI UNDERGLOVE SZ 8 BLUE

## (undated) DEVICE — 3000CC GUARDIAN II: Brand: GUARDIAN

## (undated) DEVICE — IMPERVIOUS STOCKINETTE: Brand: DEROYAL

## (undated) DEVICE — STOCKINETTE REGULAR

## (undated) DEVICE — SYRINGE 10ML LL

## (undated) DEVICE — SUT VICRYL PLUS 1 CTX 36 IN VCP371H

## (undated) DEVICE — HEAVY DUTY TABLE COVER: Brand: CONVERTORS

## (undated) DEVICE — 3M™ TEGADERM™ TRANSPARENT FILM DRESSING FRAME STYLE, 1627, 4 IN X 10 IN (10 CM X 25 CM), 20/CT 4CT/CASE: Brand: 3M™ TEGADERM™

## (undated) DEVICE — CAPIT HIP MOP - ACTIS ONLY

## (undated) DEVICE — INTENDED FOR TISSUE SEPARATION, AND OTHER PROCEDURES THAT REQUIRE A SHARP SURGICAL BLADE TO PUNCTURE OR CUT.: Brand: BARD-PARKER ® CARBON RIB-BACK BLADES

## (undated) DEVICE — SAW BLADE OSCILLATING BRAZOL 167

## (undated) DEVICE — DRAPE TOWEL: Brand: CONVERTORS

## (undated) DEVICE — STRL ALLENTOWN HIP SHOULDER PK: Brand: CARDINAL HEALTH

## (undated) DEVICE — CHEST ROLL FOAM POSITIONER: Brand: CARDINAL HEALTH

## (undated) DEVICE — SUT STRATAFIX SPIRAL PDS PLUS 1 CTX 18 IN SXPP1A400

## (undated) DEVICE — GLOVE SRG BIOGEL 7

## (undated) DEVICE — TRAY FOLEY 16FR URIMETER SURESTEP

## (undated) DEVICE — INTENDED FOR TISSUE SEPARATION, AND OTHER PROCEDURES THAT REQUIRE A SHARP SURGICAL BLADE TO PUNCTURE OR CUT.: Brand: BARD-PARKER SAFETY BLADES SIZE 10, STERILE

## (undated) DEVICE — GLOVE SRG BIOGEL 8

## (undated) DEVICE — SUT FIBERWARE #5 1/2 CIRCLE CCS-1 38IN AR-7211

## (undated) DEVICE — GLOVE INDICATOR PI UNDERGLOVE SZ 7 BLUE

## (undated) DEVICE — CHLORAPREP HI-LITE 26ML ORANGE

## (undated) DEVICE — SPECIMEN CONTAINER STERILE PEEL PACK

## (undated) DEVICE — THE SIMPULSE SOLO SYSTEM WITH ULTREX RETRACTABLE SPLASH SHIELD TIP: Brand: SIMPULSE SOLO

## (undated) DEVICE — COBAN 6 IN STERILE

## (undated) DEVICE — HOOD: Brand: FLYTE

## (undated) DEVICE — INSTRUMENT POUCH: Brand: CONVERTORS

## (undated) DEVICE — COBAN 4 IN STERILE

## (undated) DEVICE — SCD SEQUENTIAL COMPRESSION COMFORT SLEEVE MEDIUM KNEE LENGTH: Brand: KENDALL SCD

## (undated) DEVICE — 3M™ STERI-DRAPE™ U-DRAPE 1015: Brand: STERI-DRAPE™